# Patient Record
Sex: MALE | Race: WHITE
[De-identification: names, ages, dates, MRNs, and addresses within clinical notes are randomized per-mention and may not be internally consistent; named-entity substitution may affect disease eponyms.]

---

## 2018-01-23 ENCOUNTER — HOSPITAL ENCOUNTER (INPATIENT)
Dept: HOSPITAL 31 - C.ER | Age: 58
LOS: 2 days | Discharge: HOME | DRG: 153 | End: 2018-01-25
Attending: HOSPITALIST | Admitting: HOSPITALIST
Payer: COMMERCIAL

## 2018-01-23 VITALS — RESPIRATION RATE: 20 BRPM

## 2018-01-23 DIAGNOSIS — Z95.5: ICD-10-CM

## 2018-01-23 DIAGNOSIS — F17.210: ICD-10-CM

## 2018-01-23 DIAGNOSIS — Z79.899: ICD-10-CM

## 2018-01-23 DIAGNOSIS — I50.9: ICD-10-CM

## 2018-01-23 DIAGNOSIS — I11.0: ICD-10-CM

## 2018-01-23 DIAGNOSIS — Z79.82: ICD-10-CM

## 2018-01-23 DIAGNOSIS — I25.5: ICD-10-CM

## 2018-01-23 DIAGNOSIS — I25.10: ICD-10-CM

## 2018-01-23 DIAGNOSIS — Z95.810: ICD-10-CM

## 2018-01-23 DIAGNOSIS — I25.2: ICD-10-CM

## 2018-01-23 DIAGNOSIS — J11.1: Primary | ICD-10-CM

## 2018-01-23 DIAGNOSIS — E11.9: ICD-10-CM

## 2018-01-23 LAB
ALBUMIN SERPL-MCNC: 3.7 G/DL (ref 3.5–5)
ALBUMIN/GLOB SERPL: 1.2 {RATIO} (ref 1–2.1)
ALT SERPL-CCNC: 29 U/L (ref 21–72)
APTT BLD: 27 SECONDS (ref 21–34)
AST SERPL-CCNC: 26 U/L (ref 17–59)
BACTERIA #/AREA URNS HPF: (no result) /[HPF]
BASOPHILS # BLD AUTO: 0.1 K/UL (ref 0–0.2)
BASOPHILS NFR BLD: 1 % (ref 0–2)
BILIRUB UR-MCNC: NEGATIVE MG/DL
BNP SERPL-MCNC: 4910 PG/ML (ref 0–900)
BUN SERPL-MCNC: 24 MG/DL (ref 9–20)
CALCIUM SERPL-MCNC: 8.3 MG/DL (ref 8.6–10.4)
EOSINOPHIL # BLD AUTO: 0 K/UL (ref 0–0.7)
EOSINOPHIL NFR BLD: 0.3 % (ref 0–4)
ERYTHROCYTE [DISTWIDTH] IN BLOOD BY AUTOMATED COUNT: 14.6 % (ref 11.5–14.5)
GFR NON-AFRICAN AMERICAN: > 60
GLUCOSE UR STRIP-MCNC: NORMAL MG/DL
HGB BLD-MCNC: 12.7 G/DL (ref 12–18)
INR PPP: 1.4
LEUKOCYTE ESTERASE UR-ACNC: (no result) LEU/UL
LYMPHOCYTE: 2 % (ref 20–40)
LYMPHOCYTES # BLD AUTO: 0.4 K/UL (ref 1–4.3)
LYMPHOCYTES NFR BLD AUTO: 2.9 % (ref 20–40)
MCH RBC QN AUTO: 31.1 PG (ref 27–31)
MCHC RBC AUTO-ENTMCNC: 33.3 G/DL (ref 33–37)
MCV RBC AUTO: 93.3 FL (ref 80–94)
MONOCYTE: 9 % (ref 0–10)
MONOCYTES # BLD: 1 K/UL (ref 0–0.8)
MONOCYTES NFR BLD: 7.4 % (ref 0–10)
N MEN SG B+E COLI K1 AG SPEC QL: NEGATIVE
N MEN SG W135 AG SPEC QL: NEGATIVE
NEUTROPHILS # BLD: 12.4 K/UL (ref 1.8–7)
NEUTROPHILS NFR BLD AUTO: 88.4 % (ref 50–75)
NEUTROPHILS NFR BLD AUTO: 89 % (ref 50–75)
NRBC BLD AUTO-RTO: 0.1 % (ref 0–2)
PH UR STRIP: 5 [PH] (ref 5–8)
PLATELET # BLD EST: NORMAL 10*3/UL
PLATELET # BLD: 159 K/UL (ref 130–400)
PMV BLD AUTO: 9 FL (ref 7.2–11.7)
PROT UR STRIP-MCNC: (no result) MG/DL
PROTHROMBIN TIME: 15.8 SECONDS (ref 9.7–12.2)
RBC # BLD AUTO: 4.09 MIL/UL (ref 4.4–5.9)
RBC # UR STRIP: NEGATIVE /UL
RBC MORPH BLD: NORMAL
SP GR UR STRIP: 1.02 (ref 1–1.03)
STREP PNEUMONIAE: NEGATIVE
STREPTOCOCCUS B: NEGATIVE
TOTAL CELLS COUNTED BLD: 100
URINE NITRATE: NEGATIVE
UROBILINOGEN UR-MCNC: 4 MG/DL (ref 0.2–1)
WBC # BLD AUTO: 14 K/UL (ref 4.8–10.8)

## 2018-01-23 NOTE — CP.PCM.HP
<Thao Lou - Last Filed: 18 23:34>





History of Present Illness





- History of Present Illness


History of Present Illness: 





Medicine Note for Hospitalist Service





CC: fever, SOB, body ache





HPI: 57M with PMHx HTN, CAD with stents, CHF with AICD/ Pacemaker presents to 

the ED with fever, chills, headache, SOB, body ache and fatigue x1 day. Patient 

reports having the flu in 2017, no flu shot, did receive pneumonia 

shot in . Patient reports he uses public transportation (subway and bus) so 

he does have sick contacts there and also at work. Denied recent travel. He 

started to feel his symptoms earlier today, he took Motrin and mucinex and felt 

some relief. After leaving work around 4pm he had chills, weakness, his legs 

were trembling. Patient reports he has been having these symptoms 

intermittently since he was diagnosed with the flu in November, never fully 

recuperating. Denied chest pain, abdominal pain, n/v/d/c, or urinary symptoms. 





PMHx: HTN, CAD with stents, CHF with AICD/ Pacemaker 


PSHx: AICD/ Pacemaker (last interrogated 10/2017), coronary stenting and ankle 

surgery


Meds: As per MAR, reviewed and confirmed 


SHx: smoked 1.5 ppd for 19 yrs. Cut down to 1 pack per month. EtOH socially. 

Denies drug use. Works as an  in the city. 


FHx: Mother - heart dz, carotid dz. Father -  of MI. 2 brothers and 2 

sisters all have diabetes. 





PMD: No PMD at this time 


Cardio: Dr. Suresh 





Cardiac Hx: s/p RCA stent , STEMI in  Totally occluded large LAD, and 

significant disease in RCA and L Cx. At the time patient was approved for 

[facility]. 





Present on Admission





- Present on Admission


Any Indicators Present on Admission: No





Past Patient History





- Past Medical History & Family History


Past Medical History?: Yes





- Past Social History


Smoking Status: Heavy Smoker > 10 Cigarettes Daily





- CARDIAC


Hx Heart Attack: Yes





- PULMONARY


Hx Respiratory Disorders: No





- NEUROLOGICAL


Hx Neurological Disorder: No





- HEENT


Hx HEENT Problems: No





- RENAL


Hx Chronic Kidney Disease: No





- ENDOCRINE/METABOLIC


Hx Endocrine Disorders: No





- HEMATOLOGICAL/ONCOLOGICAL


Hx Blood Disorders: No





- INTEGUMENTARY


Hx Dermatological Problems: No





- MUSCULOSKELETAL/RHEUMATOLOGICAL


Hx Falls: Yes (broken ankle )





- GASTROINTESTINAL


Hx Gastrointestinal Disorders: No





- GENITOURINARY/GYNECOLOGICAL


Hx Genitourinary Disorders: No





- PSYCHIATRIC


Hx Substance Use: No





- SURGICAL HISTORY


Hx Cardiac Catheterization: Yes


Hx Orthopedic Surgery: Yes (Broken ankle, screws in place )





- ANESTHESIA


Hx Anesthesia Reactions: No


Hx Malignant Hyperthermia: No





Meds


Allergies/Adverse Reactions: 


 Allergies











Allergy/AdvReac Type Severity Reaction Status Date / Time


 


No Known Allergies Allergy   Verified 18 17:19














Physical Exam





- Constitutional


Appears: No Acute Distress





- Head Exam


Head Exam: NORMAL INSPECTION, NORMOCEPHALIC





- Eye Exam


Eye Exam: EOMI, Normal appearance, PERRL


Pupil Exam: NORMAL ACCOMODATION





- ENT Exam


ENT Exam: Mucous Membranes Moist, Normal Oropharynx (erythematous)





- Respiratory Exam


Respiratory Exam: Wheezes, NORMAL BREATHING PATTERN.  absent: Decreased Breath 

Sounds (BL LLB)





- Cardiovascular Exam


Cardiovascular Exam: REGULAR RHYTHM, RRR





- GI/Abdominal Exam


GI & Abdominal Exam: Normal Bowel Sounds, Soft.  absent: Distended, Tenderness





- Extremities Exam


Extremities exam: Positive for: normal inspection, pedal pulses present.  

Negative for: pedal edema, tenderness





- Back Exam


Back exam: NORMAL INSPECTION





- Neurological Exam


Neurological exam: Alert, CN II-XII Intact, Oriented x3





- Psychiatric Exam


Psychiatric exam: Normal Affect, Normal Mood





- Skin


Skin Exam: Dry, Intact, Normal Color, Warm





Results





- Vital Signs


Recent Vital Signs: 





 Last Vital Signs











Temp  100.7 F H  18 17:17


 


Pulse  101 H  18 17:17


 


Resp  20   18 17:17


 


BP  152/95 H  18 17:17


 


Pulse Ox  95   18 20:58














- Labs


Result Diagrams: 


 18 18:46





 18 18:46


Labs: 





 Laboratory Results - last 24 hr











  18





  18:46 18:46 18:46


 


WBC  14.0 H  


 


RBC  4.09 L  


 


Hgb  12.7  


 


Hct  38.1  


 


MCV  93.3  D  


 


MCH  31.1 H  


 


MCHC  33.3  


 


RDW  14.6 H  


 


Plt Count  159  


 


MPV  9.0  


 


Neut % (Auto)  88.4 H  


 


Lymph % (Auto)  2.9 L  


 


Mono % (Auto)  7.4  


 


Eos % (Auto)  0.3  


 


Baso % (Auto)  1.0  


 


Neut #  12.4 H  


 


Lymph #  0.4 L  


 


Mono #  1.0 H  


 


Eos #  0.0  


 


Baso #  0.1  


 


Neutrophils % (Manual)  89 H  


 


Lymphocytes % (Manual)  2 L  


 


Monocytes % (Manual)  9  


 


Platelet Estimate  Normal  


 


RBC Morphology  Normal  


 


PT   15.8 H 


 


INR   1.4 


 


APTT   27 


 


Sodium    133


 


Potassium    4.0


 


Chloride    101


 


Carbon Dioxide    25


 


Anion Gap    11


 


BUN    24 H


 


Creatinine    1.1


 


Est GFR ( Amer)    > 60


 


Est GFR (Non-Af Amer)    > 60


 


Random Glucose    126 H


 


Calcium    8.3 L


 


Total Bilirubin    1.4 H


 


AST    26


 


ALT    29


 


Alkaline Phosphatase    111


 


Troponin I    0.0840


 


NT-Pro-B Natriuret Pep    4910 H


 


Total Protein    6.7


 


Albumin    3.7


 


Globulin    3.0


 


Albumin/Globulin Ratio    1.2


 


Urine Color   


 


Urine Clarity   


 


Urine pH   


 


Ur Specific Gravity   


 


Urine Protein   


 


Urine Glucose (UA)   


 


Urine Ketones   


 


Urine Blood   


 


Urine Nitrate   


 


Urine Bilirubin   


 


Urine Urobilinogen   


 


Ur Leukocyte Esterase   


 


Urine WBC (Auto)   


 


Urine RBC (Auto)   


 


Urine Bacteria   


 


Influenza Typ A,B (EIA)   














  18





  18:53 20:15


 


WBC  


 


RBC  


 


Hgb  


 


Hct  


 


MCV  


 


MCH  


 


MCHC  


 


RDW  


 


Plt Count  


 


MPV  


 


Neut % (Auto)  


 


Lymph % (Auto)  


 


Mono % (Auto)  


 


Eos % (Auto)  


 


Baso % (Auto)  


 


Neut #  


 


Lymph #  


 


Mono #  


 


Eos #  


 


Baso #  


 


Neutrophils % (Manual)  


 


Lymphocytes % (Manual)  


 


Monocytes % (Manual)  


 


Platelet Estimate  


 


RBC Morphology  


 


PT  


 


INR  


 


APTT  


 


Sodium  


 


Potassium  


 


Chloride  


 


Carbon Dioxide  


 


Anion Gap  


 


BUN  


 


Creatinine  


 


Est GFR ( Amer)  


 


Est GFR (Non-Af Amer)  


 


Random Glucose  


 


Calcium  


 


Total Bilirubin  


 


AST  


 


ALT  


 


Alkaline Phosphatase  


 


Troponin I  


 


NT-Pro-B Natriuret Pep  


 


Total Protein  


 


Albumin  


 


Globulin  


 


Albumin/Globulin Ratio  


 


Urine Color   Yellow


 


Urine Clarity   Clear


 


Urine pH   5.0


 


Ur Specific Gravity   1.020


 


Urine Protein   1+ H


 


Urine Glucose (UA)   Normal


 


Urine Ketones   Negative


 


Urine Blood   Negative


 


Urine Nitrate   Negative


 


Urine Bilirubin   Negative


 


Urine Urobilinogen   4.0


 


Ur Leukocyte Esterase   Neg


 


Urine WBC (Auto)   1


 


Urine RBC (Auto)   1


 


Urine Bacteria   Rare


 


Influenza Typ A,B (EIA)  Negative for flu a/b 














Assessment & Plan





- Assessment and Plan (Free Text)


Assessment: 





57M with PMHx HTN, CAD with stents, CHF with AICD/ Pacemaker presents to the ED 

with fever, chills, headache, SOB, body ache and fatigue x1 day.


Plan: 





Flu


Questionable Pneumonia 


   Clinically appears like Flu, despite Negative Flu


   F/U PNA labs, BC, sputum culture, procal, mycoplasma, legionella, strep 





Imaging:


   CXR: No focal consolidation noted. 





Meds:


   NS @ 100cc/hr 


   Doxy 100 Q12


   Tamiflu 75mg PO BID 


   Duoneb Q6H PRN, Phenergran PRN 





Diabetes


   HA1C: 6.7


   Patient has lost 50lbs over the past 1-2 years, actively dieting and 

exercising





Hx HTN


   Resumed home medications: Coreg 3.125 BID, Cozaar 50mg 





Hx CAD with stents


   Resumed home medications: ASA 81, Crestor 10mg PO QHS 





Hx CHF with AICD/ Pacemaker


   Cardiologist - Dr. Suresh- BRYCE last interrogated 10/2017 - last office visit 

was 2017- next visit is 2018


   Last recorded LVEF was 30% s/p cath after STEMI in . 


   F/U ECHO 





Prophylactic Measures 


   GI PPX: Pepcid 20mg BID


   DVT PPX: SCDs, Lovenox 40 SC 





DW Dr. Warner,


Thao Lou DO, PGY-1





<Douglas Warner P - Last Filed: 18 06:38>





Results





- Vital Signs


Recent Vital Signs: 





 Last Vital Signs











Temp  102.9 F H  18 04:49


 


Pulse  92 H  18 04:57


 


Resp  20   18 04:57


 


BP  131/79   18 04:57


 


Pulse Ox  100   18 04:57














- Labs


Result Diagrams: 


 18 05:00





 18 05:00


Labs: 





 Laboratory Results - last 24 hr











  18





  18:46 18:46 18:46


 


WBC  14.0 H  


 


RBC  4.09 L  


 


Hgb  12.7  


 


Hct  38.1  


 


MCV  93.3  D  


 


MCH  31.1 H  


 


MCHC  33.3  


 


RDW  14.6 H  


 


Plt Count  159  


 


MPV  9.0  


 


Neut % (Auto)  88.4 H  


 


Lymph % (Auto)  2.9 L  


 


Mono % (Auto)  7.4  


 


Eos % (Auto)  0.3  


 


Baso % (Auto)  1.0  


 


Neut #  12.4 H  


 


Lymph #  0.4 L  


 


Mono #  1.0 H  


 


Eos #  0.0  


 


Baso #  0.1  


 


Neutrophils % (Manual)  89 H  


 


Band Neutrophils %   


 


Lymphocytes % (Manual)  2 L  


 


Monocytes % (Manual)  9  


 


Platelet Estimate  Normal  


 


RBC Morphology  Normal  


 


PT   15.8 H 


 


INR   1.4 


 


APTT   27 


 


Sodium    133


 


Potassium    4.0


 


Chloride    101


 


Carbon Dioxide    25


 


Anion Gap    11


 


BUN    24 H


 


Creatinine    1.1


 


Est GFR ( Amer)    > 60


 


Est GFR (Non-Af Amer)    > 60


 


Random Glucose    126 H


 


Hemoglobin A1c   


 


Calcium    8.3 L


 


Phosphorus   


 


Magnesium   


 


Total Bilirubin    1.4 H


 


AST    26


 


ALT    29


 


Alkaline Phosphatase    111


 


Troponin I    0.0840


 


NT-Pro-B Natriuret Pep    4910 H


 


Total Protein    6.7


 


Albumin    3.7


 


Globulin    3.0


 


Albumin/Globulin Ratio    1.2


 


Triglycerides   


 


Cholesterol   


 


LDL Cholesterol Direct   


 


HDL Cholesterol   


 


Free T4   


 


TSH 3rd Generation   


 


Urine Color   


 


Urine Clarity   


 


Urine pH   


 


Ur Specific Gravity   


 


Urine Protein   


 


Urine Glucose (UA)   


 


Urine Ketones   


 


Urine Blood   


 


Urine Nitrate   


 


Urine Bilirubin   


 


Urine Urobilinogen   


 


Ur Leukocyte Esterase   


 


Urine WBC (Auto)   


 


Urine RBC (Auto)   


 


Urine Bacteria   


 


Influenza Typ A,B (EIA)   


 


H.influenzae Type B Ag   


 


N.meningitidis ACY/W135   


 


N.meningi B/E.coli K1 Ag   


 


Group B Strep Antigen   


 


S. pneumoniae Antigen   














  18





  18:53 20:15 21:52


 


WBC   


 


RBC   


 


Hgb   


 


Hct   


 


MCV   


 


MCH   


 


MCHC   


 


RDW   


 


Plt Count   


 


MPV   


 


Neut % (Auto)   


 


Lymph % (Auto)   


 


Mono % (Auto)   


 


Eos % (Auto)   


 


Baso % (Auto)   


 


Neut #   


 


Lymph #   


 


Mono #   


 


Eos #   


 


Baso #   


 


Neutrophils % (Manual)   


 


Band Neutrophils %   


 


Lymphocytes % (Manual)   


 


Monocytes % (Manual)   


 


Platelet Estimate   


 


RBC Morphology   


 


PT   


 


INR   


 


APTT   


 


Sodium   


 


Potassium   


 


Chloride   


 


Carbon Dioxide   


 


Anion Gap   


 


BUN   


 


Creatinine   


 


Est GFR ( Amer)   


 


Est GFR (Non-Af Amer)   


 


Random Glucose   


 


Hemoglobin A1c   


 


Calcium   


 


Phosphorus   


 


Magnesium   


 


Total Bilirubin   


 


AST   


 


ALT   


 


Alkaline Phosphatase   


 


Troponin I   


 


NT-Pro-B Natriuret Pep   


 


Total Protein   


 


Albumin   


 


Globulin   


 


Albumin/Globulin Ratio   


 


Triglycerides   


 


Cholesterol   


 


LDL Cholesterol Direct   


 


HDL Cholesterol   


 


Free T4   


 


TSH 3rd Generation   


 


Urine Color   Yellow 


 


Urine Clarity   Clear 


 


Urine pH   5.0 


 


Ur Specific Gravity   1.020 


 


Urine Protein   1+ H 


 


Urine Glucose (UA)   Normal 


 


Urine Ketones   Negative 


 


Urine Blood   Negative 


 


Urine Nitrate   Negative 


 


Urine Bilirubin   Negative 


 


Urine Urobilinogen   4.0 


 


Ur Leukocyte Esterase   Neg 


 


Urine WBC (Auto)   1 


 


Urine RBC (Auto)   1 


 


Urine Bacteria   Rare 


 


Influenza Typ A,B (EIA)  Negative for flu a/b  


 


H.influenzae Type B Ag    Negative


 


N.meningitidis ACY/W135    Negative


 


N.meningi B/E.coli K1 Ag    Negative


 


Group B Strep Antigen    Negative


 


S. pneumoniae Antigen    Negative














  18





  21:59 21:59 21:59


 


WBC   


 


RBC   


 


Hgb   


 


Hct   


 


MCV   


 


MCH   


 


MCHC   


 


RDW   


 


Plt Count   


 


MPV   


 


Neut % (Auto)   


 


Lymph % (Auto)   


 


Mono % (Auto)   


 


Eos % (Auto)   


 


Baso % (Auto)   


 


Neut #   


 


Lymph #   


 


Mono #   


 


Eos #   


 


Baso #   


 


Neutrophils % (Manual)   


 


Band Neutrophils %   


 


Lymphocytes % (Manual)   


 


Monocytes % (Manual)   


 


Platelet Estimate   


 


RBC Morphology   


 


PT   


 


INR   


 


APTT   


 


Sodium   


 


Potassium   


 


Chloride   


 


Carbon Dioxide   


 


Anion Gap   


 


BUN   


 


Creatinine   


 


Est GFR ( Amer)   


 


Est GFR (Non-Af Amer)   


 


Random Glucose   


 


Hemoglobin A1c  6.7 H  


 


Calcium   


 


Phosphorus   


 


Magnesium   


 


Total Bilirubin   


 


AST   


 


ALT   


 


Alkaline Phosphatase   


 


Troponin I   


 


NT-Pro-B Natriuret Pep   


 


Total Protein   


 


Albumin   


 


Globulin   


 


Albumin/Globulin Ratio   


 


Triglycerides   


 


Cholesterol   


 


LDL Cholesterol Direct   


 


HDL Cholesterol   


 


Free T4    1.46


 


TSH 3rd Generation   0.23 L 


 


Urine Color   


 


Urine Clarity   


 


Urine pH   


 


Ur Specific Gravity   


 


Urine Protein   


 


Urine Glucose (UA)   


 


Urine Ketones   


 


Urine Blood   


 


Urine Nitrate   


 


Urine Bilirubin   


 


Urine Urobilinogen   


 


Ur Leukocyte Esterase   


 


Urine WBC (Auto)   


 


Urine RBC (Auto)   


 


Urine Bacteria   


 


Influenza Typ A,B (EIA)   


 


H.influenzae Type B Ag   


 


N.meningitidis ACY/W135   


 


N.meningi B/E.coli K1 Ag   


 


Group B Strep Antigen   


 


S. pneumoniae Antigen   














  18





  05:00 05:00


 


WBC  9.8 


 


RBC  3.91 L 


 


Hgb  12.2 


 


Hct  36.7 


 


MCV  93.9 


 


MCH  31.1 H 


 


MCHC  33.1 


 


RDW  14.7 H 


 


Plt Count  125 L D 


 


MPV  9.2 


 


Neut % (Auto)  82.0 H 


 


Lymph % (Auto)  9.4 L 


 


Mono % (Auto)  7.5 


 


Eos % (Auto)  0.4 


 


Baso % (Auto)  0.7 


 


Neut #  8.0 H 


 


Lymph #  0.9 L 


 


Mono #  0.7 


 


Eos #  0.0 


 


Baso #  0.1 


 


Neutrophils % (Manual)  83 H 


 


Band Neutrophils %  1 


 


Lymphocytes % (Manual)  8 L 


 


Monocytes % (Manual)  8 


 


Platelet Estimate  Slightly decreased L 


 


RBC Morphology  


 


PT  


 


INR  


 


APTT  


 


Sodium   132


 


Potassium   3.9


 


Chloride   103


 


Carbon Dioxide   25


 


Anion Gap   8 L


 


BUN   21 H


 


Creatinine   1.2


 


Est GFR ( Amer)   > 60


 


Est GFR (Non-Af Amer)   > 60


 


Random Glucose   101


 


Hemoglobin A1c  


 


Calcium   7.9 L


 


Phosphorus   2.4 L


 


Magnesium   1.7


 


Total Bilirubin   0.9


 


AST   26


 


ALT   31


 


Alkaline Phosphatase   91


 


Troponin I  


 


NT-Pro-B Natriuret Pep  


 


Total Protein   6.0 L


 


Albumin   3.2 L


 


Globulin   2.8


 


Albumin/Globulin Ratio   1.1


 


Triglycerides   67


 


Cholesterol   124


 


LDL Cholesterol Direct   81


 


HDL Cholesterol   26 L


 


Free T4  


 


TSH 3rd Generation  


 


Urine Color  


 


Urine Clarity  


 


Urine pH  


 


Ur Specific Gravity  


 


Urine Protein  


 


Urine Glucose (UA)  


 


Urine Ketones  


 


Urine Blood  


 


Urine Nitrate  


 


Urine Bilirubin  


 


Urine Urobilinogen  


 


Ur Leukocyte Esterase  


 


Urine WBC (Auto)  


 


Urine RBC (Auto)  


 


Urine Bacteria  


 


Influenza Typ A,B (EIA)  


 


H.influenzae Type B Ag  


 


N.meningitidis ACY/W135  


 


N.meningi B/E.coli K1 Ag  


 


Group B Strep Antigen  


 


S. pneumoniae Antigen  














Attending/Attestation





- Attestation


I have personally seen and examined this patient.: Yes


I have fully participated in the care of the patient.: Yes


I have reviewed all pertinent clinical information: Yes


Notes (Text): 





Assessment


* Febrile illness with severe shakes, cough, bodyaches, exposure to sick people

, suspicious for influenza although negative, dd of bacterimia, legionella, 

mycoplasma, empirically started on doxycycline


* H/o CAD ischemic cardiomyopathy s/p AICD, L main, lad, rca stent, compliant 

with meds


Plan


* BC, urine legionella, mycoplasma acute phase, procalcitonin


* Empiric doxy


* Echo


* GI/DVT prophylaxis


* See orders for detail.

## 2018-01-23 NOTE — C.PDOC
History Of Present Illness


57 year old male presents to the ER with a complaint of fever, chills, and 

cough. Patient has a Hx of diabetes which is poorly controlled, has had a 

cardiac cath with multiple stent placements, has poor ejection, and 

defibrillator in place. Denies SOB, nausea, or vomiting.


Time Seen by Provider: 01/23/18 18:25


Chief Complaint (Nursing): Fever


History Per: Patient


History/Exam Limitations: no limitations


Onset/Duration Of Symptoms: Hrs


Current Symptoms Are (Timing): Still Present


Sick Contacts (Context): None


Associated Symptoms: Fever, Chills, Cough


Ear Symptoms: Bilateral: None


Recent travel outside of the [place]: No





Past Medical History


Reviewed: Historical Data, Nursing Documentation, Vital Signs


Vital Signs: 


 Last Vital Signs











Temp  99.9 F H  01/23/18 22:09


 


Pulse  87   01/23/18 22:09


 


Resp  20   01/23/18 22:09


 


BP  124/67   01/23/18 22:09


 


Pulse Ox  95   01/23/18 22:09














- Medical History


PMH: CAD





- [facility] Procedures








CORONAR ARTERIOGR-2 CATH (07/27/15)


INSERTION OF ONE VASCULAR STENT (07/27/15)


INSRT OF DRUG-ELUTING CORON ARTERY STENTS(S) (07/27/15)


LEFT HEART CARDIAC CATH (07/27/15)


LT HEART ANGIOCARDIOGRAM (07/27/15)


PERCUTANEOUS TRANSLUMINAL CORONARY ANGIOPLASTY [PTCA] (07/27/15)


PROCEDURE ON SINGLE VESSEL (07/27/15)








Family History: States: Unknown Family Hx





- Social History


Hx Tobacco Use: Yes


Hx Alcohol Use: No


Hx Substance Use: No





- Immunization History


Hx Tetanus Toxoid Vaccination: No


Hx Influenza Vaccination: No


Hx Pneumococcal Vaccination: No





Review Of Systems


Constitutional: Positive for: Fever, Chills


Cardiovascular: Negative for: Chest Pain, Palpitations


Respiratory: Positive for: Cough.  Negative for: Shortness of Breath


Gastrointestinal: Negative for: Nausea, Vomiting, Abdominal Pain





Physical Exam





- Physical Exam


Appears: Non-toxic, No Acute Distress


Skin: Normal Color, Warm, Dry


Head: Atraumatic, Normacephalic


Eye(s): bilateral: Normal Inspection


Oral Mucosa: Moist


Chest: Symmetrical, No Tenderness


Cardiovascular: Rhythm Regular


Respiratory: Normal Breath Sounds, No Rales, No Rhonchi, No Wheezing


Gastrointestinal/Abdominal: Soft, No Tenderness


Neurological/Psych: Oriented x3, Normal Speech





ED Course And Treatment





- Laboratory Results


Result Diagrams: 


 01/23/18 18:46





 01/23/18 18:46


ECG: Interpreted By Me


ECG Interpretation: Normal


O2 Sat by Pulse Oximetry: 95


Pulse Ox Interpretation: Abnormal





- Radiology


CXR: Interpreted by Me


CXR Interpretation: Yes: Infiltrates (+RML)


Progress Note: IVF, rocephin, azithromycin IV


Reevaluation Time: 20:56


Reassessment Condition: Improved





- Physician Consult Information


Outcome Of Conversation: 2045: d/w Dr. Butler covering Dr. Warner- ok to admit.





Medical Decision Making


Medical Decision Making: 





prob CAP, admit due to h/o immunocompromised states- prior MI, DM





Disposition


Doctor Will See Patient In The: Hospital


Counseled Patient/Family Regarding: Studies Performed, Diagnosis





- Disposition


Disposition: HOSPITALIZED


Disposition Time: 20:58


Condition: GOOD





- Clinical Impression


Clinical Impression: 


 Pneumonia








- Scribe Statement


The provider has reviewed the documentation as recorded by the Jessicaibdarrin Bennett





All medical record entries made by the Jessicaibdarrin were at my direction and 

personally dictated by me. I have reviewed the chart and agree that the record 

accurately reflects my personal performance of the history, physical exam, 

medical decision making, and the department course for this patient. I have 

also personally directed, reviewed, and agree with the discharge instructions 

and disposition.

## 2018-01-23 NOTE — C.PDOC
Time Seen by Provider: 01/23/18 18:25


Chief Complaint (Nursing): Fever





Past Medical History


Vital Signs: 





 Last Vital Signs











Temp  100.7 F H  01/23/18 17:17


 


Pulse  101 H  01/23/18 17:17


 


Resp  20   01/23/18 17:17


 


BP  152/95 H  01/23/18 17:17


 


Pulse Ox  95   01/23/18 17:17














- Medical History


PMH: CAD


   Denies: Chronic Kidney Disease





- [facility] Procedures











CORONAR ARTERIOGR-2 CATH (07/27/15)


INSERTION OF ONE VASCULAR STENT (07/27/15)


INSRT OF DRUG-ELUTING CORON ARTERY STENTS(S) (07/27/15)


LEFT HEART CARDIAC CATH (07/27/15)


LT HEART ANGIOCARDIOGRAM (07/27/15)


PERCUTANEOUS TRANSLUMINAL CORONARY ANGIOPLASTY [PTCA] (07/27/15)


PROCEDURE ON SINGLE VESSEL (07/27/15)











- Social History


Hx Tobacco Use: Yes


Hx Alcohol Use: No


Hx Substance Use: No





- Immunization History


Hx Tetanus Toxoid Vaccination: No


Hx Influenza Vaccination: No


Hx Pneumococcal Vaccination: No





ED Course And Treatment





- Laboratory Results


Result Diagrams: 


 01/23/18 18:46





 01/23/18 18:46


O2 Sat by Pulse Oximetry: 95





Disposition





- Disposition


Forms:  [facility] (English)

## 2018-01-24 LAB
ALBUMIN SERPL-MCNC: 3.2 G/DL (ref 3.5–5)
ALBUMIN/GLOB SERPL: 1.1 {RATIO} (ref 1–2.1)
ALT SERPL-CCNC: 31 U/L (ref 21–72)
AST SERPL-CCNC: 26 U/L (ref 17–59)
BASOPHILS # BLD AUTO: 0.1 K/UL (ref 0–0.2)
BASOPHILS NFR BLD: 0.7 % (ref 0–2)
BUN SERPL-MCNC: 21 MG/DL (ref 9–20)
CALCIUM SERPL-MCNC: 7.9 MG/DL (ref 8.6–10.4)
EOSINOPHIL # BLD AUTO: 0 K/UL (ref 0–0.7)
EOSINOPHIL NFR BLD: 0.4 % (ref 0–4)
ERYTHROCYTE [DISTWIDTH] IN BLOOD BY AUTOMATED COUNT: 14.7 % (ref 11.5–14.5)
GFR NON-AFRICAN AMERICAN: > 60
HDLC SERPL-MCNC: 26 MG/DL (ref 30–70)
HGB BLD-MCNC: 12.2 G/DL (ref 12–18)
LDLC SERPL-MCNC: 81 MG/DL (ref 0–129)
LYMPHOCYTE: 8 % (ref 20–40)
LYMPHOCYTES # BLD AUTO: 0.9 K/UL (ref 1–4.3)
LYMPHOCYTES NFR BLD AUTO: 9.4 % (ref 20–40)
MAGNESIUM SERPL-MCNC: 1.7 MG/DL (ref 1.6–2.3)
MCH RBC QN AUTO: 31.1 PG (ref 27–31)
MCHC RBC AUTO-ENTMCNC: 33.1 G/DL (ref 33–37)
MCV RBC AUTO: 93.9 FL (ref 80–94)
MONOCYTE: 8 % (ref 0–10)
MONOCYTES # BLD: 0.7 K/UL (ref 0–0.8)
MONOCYTES NFR BLD: 7.5 % (ref 0–10)
NEUTROPHILS # BLD: 8 K/UL (ref 1.8–7)
NEUTROPHILS NFR BLD AUTO: 82 % (ref 50–75)
NEUTROPHILS NFR BLD AUTO: 83 % (ref 50–75)
NEUTS BAND NFR BLD: 1 % (ref 0–2)
NRBC BLD AUTO-RTO: 0 % (ref 0–2)
PLATELET # BLD EST: (no result) 10*3/UL
PLATELET # BLD: 125 K/UL (ref 130–400)
PMV BLD AUTO: 9.2 FL (ref 7.2–11.7)
RBC # BLD AUTO: 3.91 MIL/UL (ref 4.4–5.9)
TOTAL CELLS COUNTED BLD: 100
WBC # BLD AUTO: 9.8 K/UL (ref 4.8–10.8)

## 2018-01-24 RX ADMIN — PROMETHAZINE HYDROCHLORIDE PRN MG: 6.25 SYRUP ORAL at 13:34

## 2018-01-24 NOTE — CARD
--------------- APPROVED REPORT --------------





EXAM: Two-dimensional and M-mode echocardiogram with Doppler and 

color Doppler.



Other Information 

Quality : GoodRhythm : 



INDICATION

LV Function:SystolicDiastolic STEMI



RISK FACTORS

Hypertension 

Diabetes



2D DIMENSIONS 

IVSd0.9   (0.7-1.1cm)LVDd6.0   (3.9-5.9cm)

PWd1.6   (0.7-1.1cm)LVDs5.5   (2.5-4.0cm)

FS (%) 9.1   %LVEF (%)19.6   (>50%)



M-Mode DIMENSIONS 

RVDd2.46   (2.1-3.2cm)Left Atrium (MM)4.75   (2.5-4.0cm)

IVSd0.70   (0.7-1.1cm)Aortic Root3.19   (2.2-3.7cm)

LVDd6.33   (4.0-5.6cm)Aortic Cusp Exc.2.05   (1.5-2.0cm)

PWd1.29   (0.7-1.1cm)FS (%) 17   %

LVDs5.27   (2.0-3.8cm)



Mitral Valve

MV E Vpzytmun038.1cm/sMV A Ljohciug76.8cm/sE/A ratio1.8



TDI

E/Lateral E'0.0E/Medial E'0.0



Tricuspid Valve

TR Peak Oqsemsve280pa/sTR Peak Gr.45goPnCFAS44jpRp



 LEFT VENTRICLE 

The left ventricle is milldly dilated.

There is normal left ventricular wall thickness. 

The left ventricular function is seveely reduced, wth diffuse 

hypokinesis. There is akinesis of the septal, inferior and apical 

walls. 

The left ventricular ejection fraction is about 15%.

Transmitral Doppler flow pattern is Grade IIIV-restrictive diastolic 

dysfunction. tissue Doppler is c/w elevated LA pressure.

No left ventricle thrombus noted on this study.

There is no ventricular septal defect visualized.

There is no mass noted in the left ventricle.



 RIGHT VENTRICLE 

The right ventricle is normal size.

There is normal right ventricular wall thickness.

The right ventricular systolic function is normal.

An ICD wire is noted.



 ATRIA 

The left atrial volume index is mild to moderately dilated.

The right atrium size is mild to moderately dilated.

The interatrial septum is intact with no evidence for an atrial 

septal defect.



 AORTIC VALVE 

The aortic valve is normal in structure and function.

No aortic regurgitation is present. 

There is no aortic valvular stenosis. 

There is no aortic valvular vegetation.



 MITRAL VALVE 

The mitral valve is normal in structure and function.

There is no evidence of mitral valve prolapse.

There is no mitral valve stenosis. 

There is mild mitral valve regurgitation noted.



 TRICUSPID VALVE 

The tricuspid valve is normal in structure and function.

There is mild tricuspid valve regurgitation noted. estimated PA 

systolic pressure is 59 mm Hg.

There is no tricuspid valve prolapse or vegetation.

There is no tricuspid valve stenosis. 



 PULMONIC VALVE 

The pulmonary valve is normal in structure and function.

There is no pulmonic valvular regurgitation. 

There is no pulmonic valvular stenosis.



 GREAT VESSELS 

The aortic root is normal in size.

The ascending aorta is normal in size.

The pulmonary artery is normal.

The IVC is dilated in size and collapses >50% with inspiration.



 PERICARDIAL EFFUSION 

The pericardium appears normal.

There is no pleural effusion.



<Conclusion>

The left ventricular function is severely reduced, wth diffuse 

hypokinesis. There is akinesis of the septal, inferior and apical 

walls. 

Transmitral Doppler flow pattern is Grade IIIV-restrictive diastolic 

dysfunction.

Mild mitral regurgitation.

Elevated left atrial pressure.

Moderate pulmonary HTN

## 2018-01-24 NOTE — RAD
PROCEDURE:  CHEST RADIOGRAPH, 1 VIEW



HISTORY:

Shortness of breath 



COMPARISON:

07/27/2015.



FINDINGS:



LUNGS:

The lungs are well inflated and clear.



PLEURA:

No pneumothorax or pleural fluid seen.



CARDIOVASCULAR:

There is persistent moderate cardiomegaly. There is a left-sided dual 

lead transvenous permanent pacing device.



OSSEOUS STRUCTURES:

No significant abnormalities.



VISUALIZED UPPER ABDOMEN:

Normal.



OTHER FINDINGS:

None. 



IMPRESSION:

No active pulmonary disease.

## 2018-01-24 NOTE — CARD
--------------- APPROVED REPORT --------------





EKG Measurement

Heart [id number]

TX [id number]

[id number]

[id number]

[id number]



<Conclusion>

Normal sinus rhythm

Possible Left atrial enlargement

Left ventricular hypertrophy with repolarization abnormality

Abnormal ECG

## 2018-01-24 NOTE — CP.PCM.PN
<Marttia Mia - Last Filed: 01/24/18 11:41>





Subjective





- Date & Time of Evaluation


Date of Evaluation: 01/24/18


Time of Evaluation: 07:00





- Subjective


Subjective: 





Medicine Progress Note:





Patient was seen and examined at bedside in the AM.  Patient states he had a 

fever overnight and he continues to have a slight cough.  He denies nausea, 

vomiting, diarrhea or constipation.  





Objective





- Vital Signs/Intake and Output


Vital Signs (last 24 hours): 


 











Temp Pulse Resp BP Pulse Ox


 


 99.7 F H  84   20   116/76   96 


 


 01/24/18 06:41  01/24/18 06:41  01/24/18 06:41  01/24/18 06:41  01/24/18 06:41











- Medications


Medications: 


 Current Medications





Acetaminophen (Tylenol 325mg Tab)  650 mg PO Q6 PRN


   PRN Reason: Fever >100.4 F


   Last Admin: 01/24/18 04:49 Dose:  650 mg


Acetaminophen (Tylenol 325mg Tab)  650 mg PO Q6 [facility]


   Stop: 01/25/18 00:01


   Last Admin: 01/24/18 06:16 Dose:  Not Given


Albuterol/Ipratropium (Duoneb 3 Mg/0.5 Mg (3 Ml) Ud)  3 ml INH RQ6 PRN


   PRN Reason: Wheezing


   Last Admin: 01/24/18 01:48 Dose:  3 ml


Aspirin (Aspirin Chewable)  81 mg PO DAILY [facility]


Carvedilol (Coreg)  3.125 mg PO BID [facility]


Doxycycline Hyclate (Doryx)  100 mg PO Q12H [facility]


Enoxaparin Sodium (Lovenox)  40 mg SC DAILY [facility]


Famotidine (Pepcid)  20 mg PO BID [facility]


Sodium Chloride (Sodium Chloride 0.9%)  1,000 mls @ 80 mls/hr IV .[id number] [facility]


   Last Admin: 01/24/18 01:15 Dose:  80 mls/hr


Losartan Potassium (Cozaar)  50 mg PO DAILY [facility]


Ondansetron HCl (Zofran Inj)  4 mg IVP Q6 PRN


   PRN Reason: Nausea/Vomiting


Oseltamivir Phosphate (Tamiflu Cap)  75 mg PO BID [facility]


   Stop: 01/28/18 22:32


Promethazine HCl (Phenergan Syrup)  12.5 mg PO Q6 PRN


   PRN Reason: Cough


Rosuvastatin Calcium (Crestor)  10 mg PO HS [facility]


   Last Admin: 01/23/18 22:09 Dose:  10 mg











- Labs


Labs: 


 





 01/24/18 05:00 





 01/24/18 05:00 





 











PT  15.8 SECONDS (9.7-12.2)  H  01/23/18  18:46    


 


INR  1.4   01/23/18  18:46    


 


APTT  27 SECONDS (21-34)   01/23/18  18:46    














- Constitutional


Appears: No Acute Distress





- Head Exam


Head Exam: ATRAUMATIC, NORMAL INSPECTION





- Eye Exam


Eye Exam: EOMI, Normal appearance





- ENT Exam


ENT Exam: Mucous Membranes Moist





- Respiratory Exam


Respiratory Exam: Wheezes (bilateral lobes ), NORMAL BREATHING PATTERN.  absent

: Respiratory Distress





- Cardiovascular Exam


Cardiovascular Exam: REGULAR RHYTHM, +S1, +S2





- GI/Abdominal Exam


GI & Abdominal Exam: Soft, Normal Bowel Sounds.  absent: Tenderness





- Extremities Exam


Extremities Exam: Normal Inspection





- Neurological Exam


Neurological Exam: Alert, Awake, Oriented x3





- Psychiatric Exam


Psychiatric exam: Normal Affect, Normal Mood





- Skin


Skin Exam: Normal Color, Warm





Assessment and Plan





- Assessment and Plan (Free Text)


Assessment: 





1.) Clinical Influenza 


Clinically appears like Flu, despite Negative Flu


- f/u blood culture and sputum culture


- f/u Procal


- mycoplasma, legionella, strep pneumo, and h. influenza --> All negative


- N. Meningitis negative 


- Imaging:


   - Chest X-ray: No focal consolidation noted


   - f/u repeat Chest xray in the AM 


- Medications:


*  bolus one time 


* Doxy 100 Q12


* Tamiflu 75mg PO BID 


* Duoneb Q6H PRN


* Phenergran PRN 





2.) History of Diabetes


- HA1C: 6.7


- Per previous note patient has lost 50lbs over the past 1-2 years, actively 

dieting and exercising





3.) History of HTN


- Coreg 3.125 BID


- Cozaar 50mg 





4.) History of CAD with stents


- Aspirin 81mg daily


- Crestor 10mg PO QHS 





5.) History of CHF with AICD/ Pacemaker


- Cardiologist - Dr. Suresh- AICD last interrogated 10/2017 - last office visit 

was 11/2017- next visit is 2/2018


- Last recorded LVEF was 30% s/p cath after STEMI in 2015. 


- F/U ECHO 





6.) Prophylactic Measures 


- GI PPX: Pepcid 20mg BID


- DVT PPX: SCDs, Lovenox 40 SC





Case discussed with Dr. Krystyna Mai PGY-1  





<Edwin Greenwood - Last Filed: 01/24/18 14:45>





Objective





- Vital Signs/Intake and Output


Vital Signs (last 24 hours): 


 











Temp Pulse Resp BP Pulse Ox


 


 100.0 F H  71   20   120/72   96 


 


 01/24/18 13:31  01/24/18 08:52  01/24/18 08:52  01/24/18 08:52  01/24/18 08:52











- Medications


Medications: 


 Current Medications





Acetaminophen (Tylenol 325mg Tab)  650 mg PO Q6 PRN


   PRN Reason: Fever >100.4 F


   Last Admin: 01/24/18 04:49 Dose:  650 mg


Acetaminophen (Tylenol 325mg Tab)  650 mg PO Q6 [facility]


   Stop: 01/25/18 00:01


   Last Admin: 01/24/18 13:31 Dose:  650 mg


Albuterol/Ipratropium (Duoneb 3 Mg/0.5 Mg (3 Ml) Ud)  3 ml INH RQ6 PRN


   PRN Reason: Wheezing


   Last Admin: 01/24/18 01:48 Dose:  3 ml


Aspirin (Aspirin Chewable)  81 mg PO DAILY [facility]


   Last Admin: 01/24/18 10:45 Dose:  81 mg


Carvedilol (Coreg)  3.125 mg PO BID [facility]


   Last Admin: 01/24/18 10:45 Dose:  3.125 mg


Doxycycline Hyclate (Doryx)  100 mg PO Q12H [facility]


   Last Admin: 01/24/18 11:23 Dose:  100 mg


Enoxaparin Sodium (Lovenox)  40 mg SC DAILY [facility]


   Last Admin: 01/24/18 10:55 Dose:  40 mg


Famotidine (Pepcid)  20 mg PO BID [facility]


   Last Admin: 01/24/18 10:55 Dose:  20 mg


Sodium Chloride (Sodium Chloride 0.9%)  1,000 mls @ 80 mls/hr IV .[id number] [facility]


   Last Admin: 01/24/18 13:32 Dose:  80 mls/hr


Losartan Potassium (Cozaar)  50 mg PO DAILY [facility]


   Last Admin: 01/24/18 10:55 Dose:  50 mg


Ondansetron HCl (Zofran Inj)  4 mg IVP Q6 PRN


   PRN Reason: Nausea/Vomiting


Oseltamivir Phosphate (Tamiflu Cap)  75 mg PO BID [facility]


   Stop: 01/28/18 22:32


   Last Admin: 01/24/18 10:55 Dose:  75 mg


Promethazine HCl (Phenergan Syrup)  12.5 mg PO Q6 PRN


   PRN Reason: Cough


   Last Admin: 01/24/18 13:34 Dose:  12.5 mg


Rosuvastatin Calcium (Crestor)  10 mg PO HS [facility]


   Last Admin: 01/23/18 22:09 Dose:  10 mg











- Labs


Labs: 


 





 01/24/18 05:00 





 01/24/18 05:00 





 











PT  15.8 SECONDS (9.7-12.2)  H  01/23/18  18:46    


 


INR  1.4   01/23/18  18:46    


 


APTT  27 SECONDS (21-34)   01/23/18  18:46    














Attending/Attestation





- Attestation


I have personally seen and examined this patient.: Yes


I have fully participated in the care of the patient.: Yes


I have reviewed all pertinent clinical information, including history, physical 

exam and plan: Yes


Notes (Text): 





01/24/18 14:45


Medical attending: Patient was seen and examined by me, agrees the above note 

by medical resident.





Patient reported that he was feeling somewhat better than previously. His x-ray 

from this morning appeared to be stable we also reviewed his lab work which was 

fine as well.





He will be given a small bolus of intravenous fluid, additional supportive care

, as well as another repeat chest x-ray tomorrow if this repeat chest x-rays 

okay will consider discharging the patient tomorrow. He's can have to continue 

taking the Tamiflu





Thank you very much,


Edwin Greenwood

## 2018-01-25 VITALS
OXYGEN SATURATION: 97 % | TEMPERATURE: 98.2 F | HEART RATE: 73 BPM | SYSTOLIC BLOOD PRESSURE: 133 MMHG | DIASTOLIC BLOOD PRESSURE: 66 MMHG

## 2018-01-25 LAB
ALBUMIN SERPL-MCNC: 3.1 G/DL (ref 3.5–5)
ALBUMIN/GLOB SERPL: 1.1 {RATIO} (ref 1–2.1)
ALT SERPL-CCNC: 57 U/L (ref 21–72)
AST SERPL-CCNC: 58 U/L (ref 17–59)
BASOPHILS # BLD AUTO: 0 K/UL (ref 0–0.2)
BASOPHILS NFR BLD: 0.7 % (ref 0–2)
BUN SERPL-MCNC: 27 MG/DL (ref 9–20)
CALCIUM SERPL-MCNC: 7.8 MG/DL (ref 8.6–10.4)
EOSINOPHIL # BLD AUTO: 0.1 K/UL (ref 0–0.7)
EOSINOPHIL NFR BLD: 1 % (ref 0–4)
ERYTHROCYTE [DISTWIDTH] IN BLOOD BY AUTOMATED COUNT: 15.2 % (ref 11.5–14.5)
GFR NON-AFRICAN AMERICAN: 45
HGB BLD-MCNC: 12 G/DL (ref 12–18)
LYMPHOCYTES # BLD AUTO: 1.4 K/UL (ref 1–4.3)
LYMPHOCYTES NFR BLD AUTO: 20.5 % (ref 20–40)
MAGNESIUM SERPL-MCNC: 1.9 MG/DL (ref 1.6–2.3)
MCH RBC QN AUTO: 32.1 PG (ref 27–31)
MCHC RBC AUTO-ENTMCNC: 33.6 G/DL (ref 33–37)
MCV RBC AUTO: 95.4 FL (ref 80–94)
MONOCYTES # BLD: 0.8 K/UL (ref 0–0.8)
MONOCYTES NFR BLD: 11.8 % (ref 0–10)
NEUTROPHILS # BLD: 4.6 K/UL (ref 1.8–7)
NEUTROPHILS NFR BLD AUTO: 66 % (ref 50–75)
NRBC BLD AUTO-RTO: 0 % (ref 0–2)
PLATELET # BLD: 126 K/UL (ref 130–400)
PMV BLD AUTO: 9.8 FL (ref 7.2–11.7)
RBC # BLD AUTO: 3.74 MIL/UL (ref 4.4–5.9)
WBC # BLD AUTO: 7 K/UL (ref 4.8–10.8)

## 2018-01-25 RX ADMIN — PROMETHAZINE HYDROCHLORIDE PRN MG: 6.25 SYRUP ORAL at 00:39

## 2018-01-25 NOTE — CP.PCM.DIS
<Martita Mai - Last Filed: 18 14:59>





Provider





- Provider


Date of Admission: 


18 20:54





Attending physician: 


Edwin Greenwood DO





Time Spent in preparation of Discharge (in minutes): 40





Hospital Course





- Lab Results


Lab Results: 


 Micro Results





18 21:30   Blood-Venous   Blood Culture - Preliminary


                            NO GROWTH AFTER 24 HOURS


18 21:00   Blood-Venous   Blood Culture - Preliminary


                            NO GROWTH AFTER 24 HOURS





 Most Recent Lab Values











WBC  7.0 K/uL (4.8-10.8)   18  08:39    


 


RBC  3.74 Mil/uL (4.40-5.90)  L  18  08:39    


 


Hgb  12.0 g/dL (12.0-18.0)   18  08:39    


 


Hct  35.7 % (35.0-51.0)   18  08:39    


 


MCV  95.4 fL (80.0-94.0)  H  18  08:39    


 


MCH  32.1 pg (27.0-31.0)  H  18  08:39    


 


MCHC  33.6 g/dL (33.0-37.0)   18  08:39    


 


RDW  15.2 % (11.5-14.5)  H  18  08:39    


 


Plt Count  126 K/uL (130-400)  L  18  08:39    


 


MPV  9.8 fL (7.2-11.7)   18  08:39    


 


Neut % (Auto)  66.0 % (50.0-75.0)   18  08:39    


 


Lymph % (Auto)  20.5 % (20.0-40.0)   18  08:39    


 


Mono % (Auto)  11.8 % (0.0-10.0)  H  18  08:39    


 


Eos % (Auto)  1.0 % (0.0-4.0)   18  08:39    


 


Baso % (Auto)  0.7 % (0.0-2.0)   18  08:39    


 


Neut #  4.6 K/uL (1.8-7.0)   18  08:39    


 


Lymph #  1.4 K/uL (1.0-4.3)   18  08:39    


 


Mono #  0.8 K/uL (0.0-0.8)   18  08:39    


 


Eos #  0.1 K/uL (0.0-0.7)   18  08:39    


 


Baso #  0.0 K/uL (0.0-0.2)   18  08:39    


 


Neutrophils % (Manual)  83 % (50-75)  H  18  05:00    


 


Band Neutrophils %  1 % (0-2)   18  05:00    


 


Lymphocytes % (Manual)  8 % (20-40)  L  18  05:00    


 


Monocytes % (Manual)  8 % (0-10)   18  05:00    


 


Platelet Estimate  Slightly decreased  (NORMAL)  L  18  05:00    


 


RBC Morphology  Normal   18  18:46    


 


PT  15.8 SECONDS (9.7-12.2)  H  18  18:46    


 


INR  1.4   18  18:46    


 


APTT  27 SECONDS (21-34)   18  18:46    


 


Sodium  133 mmol/L (132-148)   18  08:39    


 


Potassium  5.1 mmol/L (3.6-5.2)   18  08:39    


 


Chloride  104 mmol/L ()   18  08:39    


 


Carbon Dioxide  24 mmol/L (22-30)   18  08:39    


 


Anion Gap  10  (10-20)   18  08:39    


 


BUN  27 mg/dL (9-20)  H  18  08:39    


 


Creatinine  1.6 mg/dL (0.8-1.5)  H  18  08:39    


 


Est GFR ( Amer)  54   18  08:39    


 


Est GFR (Non-Af Amer)  45   18  08:39    


 


Random Glucose  99 mg/dL ()   18  08:39    


 


Hemoglobin A1c  6.7 % (4.2-6.5)  H  18  21:59    


 


Calcium  7.8 mg/dl (8.6-10.4)  L  18  08:39    


 


Phosphorus  3.4 mg/dL (2.5-4.5)   18  08:39    


 


Magnesium  1.9 mg/dL (1.6-2.3)   18  08:39    


 


Total Bilirubin  0.9 mg/dL (0.2-1.3)   18  08:39    


 


AST  58 U/L (17-59)   18  08:39    


 


ALT  57 U/L (21-72)   18  08:39    


 


Alkaline Phosphatase  115 U/L ()   18  08:39    


 


Troponin I  0.0840 ng/mL (0.00-0.120)   18  18:46    


 


NT-Pro-B Natriuret Pep  4910 pg/mL (0-900)  H  18  18:46    


 


Total Protein  6.0 g/dL (6.3-8.3)  L  18  08:39    


 


Albumin  3.1 g/dL (3.5-5.0)  L  18  08:39    


 


Globulin  2.9 gm/dL (2.2-3.9)   18  08:39    


 


Albumin/Globulin Ratio  1.1  (1.0-2.1)   18  08:39    


 


Triglycerides  67 mg/dL (0-149)   18  05:00    


 


Cholesterol  124 mg/dL (0-199)   18  05:00    


 


LDL Cholesterol Direct  81 mg/dL (0-129)   18  05:00    


 


HDL Cholesterol  26 mg/dL (30-70)  L  18  05:00    


 


Procalcitonin  1.23 NG/ML (0.19-0.49)  H  18  07:41    


 


Free T4  1.46 ng/dL (0.78-2.19)   18  21:59    


 


TSH 3rd Generation  0.23 mIU/L (0.46-4.68)  L  18  21:59    


 


Urine Color  Yellow  (YELLOW)   18  20:15    


 


Urine Clarity  Clear  (Clear)   18  20:15    


 


Urine pH  5.0  (5.0-8.0)   18  20:15    


 


Ur Specific Gravity  1.020  (1.003-1.030)   18  20:15    


 


Urine Protein  1+ mg/dL (NEGATIVE)  H  18  20:15    


 


Urine Glucose (UA)  Normal mg/dL (Normal)   18  20:15    


 


Urine Ketones  Negative mg/dL (NEGATIVE)   18  20:15    


 


Urine Blood  Negative  (NEGATIVE)   18  20:15    


 


Urine Nitrate  Negative  (NEGATIVE)   18  20:15    


 


Urine Bilirubin  Negative  (NEGATIVE)   18  20:15    


 


Urine Urobilinogen  4.0 mg/dL (0.2-1.0)   18  20:15    


 


Ur Leukocyte Esterase  Neg Irish/uL (Negative)   18  20:15    


 


Urine WBC (Auto)  1 /hpf (0-5)   18  20:15    


 


Urine RBC (Auto)  1 /hpf (0-3)   18  20:15    


 


Urine Bacteria  Rare  (<OCC)   18  20:15    


 


Influenza Typ A,B (EIA)  Negative for flu a/b  (NEGATIVE)   18  18:53    


 


H.influenzae Type B Ag  Negative  (NEGATIVE)   18  21:52    


 


Ur L.pneumophila Ag  Negative  (NEGATIVE)   18  07:48    


 


Mycoplasma pneumon IgM  Negative  (NEGATIVE)   18  07:41    


 


N.meningitidis ACY/W135  Negative  (NEGATIVE)   18  21:52    


 


N.meningi B/E.coli K1 Ag  Negative  (NEGATIVE)   18  21:52    


 


Group B Strep Antigen  Negative  (NEGATIVE)   18  21:52    


 


S. pneumoniae Antigen  Negative  (NEGATIVE)   18  21:52    














- Hospital Course


Hospital Course: 


HPI: 57M with PMHx HTN, CAD with stents, CHF with AICD/ Pacemaker presents to 

the ED with fever, chills, headache, SOB, body ache and fatigue x1 day. Patient 

reports having the flu in 2017, no flu shot, did receive pneumonia 

shot in 2017. Patient reports he uses public transportation (subway and bus) so 

he does have sick contacts there and also at work. Denied recent travel. He 

started to feel his symptoms earlier today, he took Motrin and mucinex and felt 

some relief. After leaving work around 4pm he had chills, weakness, his legs 

were trembling. Patient reports he has been having these symptoms 

intermittently since he was diagnosed with the flu in November, never fully 

recuperating. Denied chest pain, abdominal pain, n/v/d/c, or urinary symptoms. 





PMHx: HTN, CAD with stents, CHF with AICD/ Pacemaker 


PSHx: AICD/ Pacemaker (last interrogated 10/2017), coronary stenting and ankle 

surgery


Meds: As per MAR, reviewed and confirmed 


SHx: smoked 1.5 ppd for 19 yrs. Cut down to 1 pack per month. EtOH socially. 

Denies drug use. [place] as an  [place] the city. 


FHx: Mother - heart dz, carotid dz. Father -  of MI. 2 brothers and 2 

sisters all have diabetes. 





PMD: No PMD at this time 


Cardio: Dr. Suresh 





Cardiac Hx: s/p RCA stent , STEMI in  Totally occluded large LAD, and 

significant disease in RCA and L Cx. At the time patient was approved for 

[facility]. 





Hospital Course:


Patient was admitted for clinical influenza. Patient tested negative for 

influenza types A & B, but had clinical symptoms of influenza. H. influenza 

type B, L. pneumophilia Ag, Mycoplasma pneumonia IgM, N. meningitidis, Group B 

strep, and S. pneumonia Antigen were also negative. Patient's CXR, and repeat 

CXR showed no active pulmonary disease. Patient given  mls bolus once, 

doxycycline 100 mg q12, Tamiflu 75 mg po bid, Duonebs, and Phenergran. Patient'

s fever, chills, body aches, and SOB have resolved. Patient states that his 

cough has improved since admission. Patient to continue Tamiflu and Mucinex 

after discharge. 





Patient has a history of CHF with AICD/pacemaker placed. As per patient, AICD 

last interrogated 10/2017. Patient previously had a last recorded LVEF was 30% s

/p cath after STEMI in . During this hospital course an echo on 18 

showed LVEF 20%, severely reduced left ventricular function with diffuse 

hypokinesis, and akinesis of septal, inferior and apical walls. Transmitral 

doppler flow pattern is Grade IIIV-restrictive diastolic dysfunction. 


Patient has a history of hypertension, and CAD with stents. Patient received 

Coreg 3.125 mg bid, Cozaar 50 mg daily, aspirin 81 mg daily, and Crestor 10 mg 

daily. Discussed with patient to follow up with cardiologist Dr. Suresh.  


Patient has a history of diabetes. Patient's Hb A1C was 6.7%. As per previous 

note, patient has lost 50 lbs over the past 1-2 years actively dieting and 

exercising. 





This is a summary of patient's hospital course, please see chart for full 

details.





Patient stable for discharge home per Dr. Greenwood.


Please continue new medication:


Tamiflu 75mg one tablet this evening followed by 1 tablet twice per day for 

another 3 days.


Mucinex over the counter as needed for cough


Please continue home medications:


Aspirin 81mg one tablet daily


Lipitor 20mg one tablet in the evening


Losartan 50mg one tablet daily


Coreg 3.125mg one tablet twice per day





Please follow up with your primary doctor in 1-2 weeks.


Please follow up with your cardiologist Dr. Suresh.


Please return to the emergency room if symptoms return. 





Discharge Exam





- Head Exam


Head Exam: ATRAUMATIC, NORMAL INSPECTION





- Eye Exam


Eye Exam: EOMI, Normal appearance





- ENT Exam


ENT Exam: Mucous Membranes Moist





- Respiratory Exam


Respiratory Exam: Clear to PA & Lateral, NORMAL BREATHING PATTERN





- Cardiovascular Exam


Cardiovascular Exam: REGULAR RHYTHM, +S1, +S2





- GI/Abdominal Exam


GI & Abdominal Exam: Normal Bowel Sounds, Soft.  absent: Tenderness





- Extremities Exam


Extremities exam: normal inspection





- Neurological Exam


Neurological exam: Alert, Oriented x3





- Psychiatric Exam


Psychiatric exam: Normal Affect, Normal Mood





- Skin


Skin Exam: Dry, Intact, Normal Color, Warm





Discharge Plan





- Discharge Medications


Prescriptions: 


Oseltamivir [Tamiflu Cap] 75 mg PO BID #7 cap





- Follow Up Plan


Condition: GOOD


Disposition: HOME/ ROUTINE


Instructions:  Guaifenesin (By mouth), Oseltamivir (By mouth), Pneumonia (DC)


Additional Instructions: 


Patient stable for discharge home per Dr. Greenwood.


Please continue new medication:


Tamiflu 75mg one tablet this evening followed by 1 tablet twice per day for 

another 3 days.


Mucinex over the counter as needed for cough


Please continue home medications:


Aspirin 81mg one tablet daily


Lipitor 20mg one tablet in the evening


Losartan 50mg one tablet daily


Coreg 3.125mg one tablet twice per day





Please follow up with your primary doctor in 1-2 weeks.


Please follow up with your cardiologist Dr. Suresh.


Please return to the emergency room if symptoms return. 


Referrals: 


Rashaad Suresh MD [Staff Provider] - 





<Edwin Greenwood - Last Filed: 18 15:25>





Provider





- Provider


Date of Admission: 


18 20:54





Attending physician: 


Edwin Greenwood DO








Hospital Course





- Lab Results


Lab Results: 


 Micro Results





18 21:30   Blood-Venous   Blood Culture - Preliminary


                            NO GROWTH AFTER 24 HOURS


18 21:00   Blood-Venous   Blood Culture - Preliminary


                            NO GROWTH AFTER 24 HOURS





 Most Recent Lab Values











WBC  7.0 K/uL (4.8-10.8)   18  08:39    


 


RBC  3.74 Mil/uL (4.40-5.90)  L  18  08:39    


 


Hgb  12.0 g/dL (12.0-18.0)   18  08:39    


 


Hct  35.7 % (35.0-51.0)   18  08:39    


 


MCV  95.4 fL (80.0-94.0)  H  18  08:39    


 


MCH  32.1 pg (27.0-31.0)  H  18  08:39    


 


MCHC  33.6 g/dL (33.0-37.0)   18  08:39    


 


RDW  15.2 % (11.5-14.5)  H  18  08:39    


 


Plt Count  126 K/uL (130-400)  L  18  08:39    


 


MPV  9.8 fL (7.2-11.7)   18  08:39    


 


Neut % (Auto)  66.0 % (50.0-75.0)   18  08:39    


 


Lymph % (Auto)  20.5 % (20.0-40.0)   18  08:39    


 


Mono % (Auto)  11.8 % (0.0-10.0)  H  18  08:39    


 


Eos % (Auto)  1.0 % (0.0-4.0)   18  08:39    


 


Baso % (Auto)  0.7 % (0.0-2.0)   18  08:39    


 


Neut #  4.6 K/uL (1.8-7.0)   18  08:39    


 


Lymph #  1.4 K/uL (1.0-4.3)   18  08:39    


 


Mono #  0.8 K/uL (0.0-0.8)   18  08:39    


 


Eos #  0.1 K/uL (0.0-0.7)   18  08:39    


 


Baso #  0.0 K/uL (0.0-0.2)   18  08:39    


 


Neutrophils % (Manual)  83 % (50-75)  H  18  05:00    


 


Band Neutrophils %  1 % (0-2)   18  05:00    


 


Lymphocytes % (Manual)  8 % (20-40)  L  18  05:00    


 


Monocytes % (Manual)  8 % (0-10)   18  05:00    


 


Platelet Estimate  Slightly decreased  (NORMAL)  L  18  05:00    


 


RBC Morphology  Normal   18  18:46    


 


PT  15.8 SECONDS (9.7-12.2)  H  18  18:46    


 


INR  1.4   18  18:46    


 


APTT  27 SECONDS (21-34)   18  18:46    


 


Sodium  133 mmol/L (132-148)   18  08:39    


 


Potassium  5.1 mmol/L (3.6-5.2)   18  08:39    


 


Chloride  104 mmol/L ()   18  08:39    


 


Carbon Dioxide  24 mmol/L (22-30)   18  08:39    


 


Anion Gap  10  (10-20)   18  08:39    


 


BUN  27 mg/dL (9-20)  H  18  08:39    


 


Creatinine  1.6 mg/dL (0.8-1.5)  H  18  08:39    


 


Est GFR ( Amer)  54   18  08:39    


 


Est GFR (Non-Af Amer)  45   18  08:39    


 


Random Glucose  99 mg/dL ()   18  08:39    


 


Hemoglobin A1c  6.7 % (4.2-6.5)  H  18  21:59    


 


Calcium  7.8 mg/dl (8.6-10.4)  L  18  08:39    


 


Phosphorus  3.4 mg/dL (2.5-4.5)   18  08:39    


 


Magnesium  1.9 mg/dL (1.6-2.3)   18  08:39    


 


Total Bilirubin  0.9 mg/dL (0.2-1.3)   18  08:39    


 


AST  58 U/L (17-59)   18  08:39    


 


ALT  57 U/L (21-72)   18  08:39    


 


Alkaline Phosphatase  115 U/L ()   18  08:39    


 


Troponin I  0.0840 ng/mL (0.00-0.120)   18  18:46    


 


NT-Pro-B Natriuret Pep  4910 pg/mL (0-900)  H  18  18:46    


 


Total Protein  6.0 g/dL (6.3-8.3)  L  18  08:39    


 


Albumin  3.1 g/dL (3.5-5.0)  L  18  08:39    


 


Globulin  2.9 gm/dL (2.2-3.9)   18  08:39    


 


Albumin/Globulin Ratio  1.1  (1.0-2.1)   18  08:39    


 


Triglycerides  67 mg/dL (0-149)   18  05:00    


 


Cholesterol  124 mg/dL (0-199)   18  05:00    


 


LDL Cholesterol Direct  81 mg/dL (0-129)   18  05:00    


 


HDL Cholesterol  26 mg/dL (30-70)  L  18  05:00    


 


Procalcitonin  1.23 NG/ML (0.19-0.49)  H  18  07:41    


 


Free T4  1.46 ng/dL (0.78-2.19)   18  21:59    


 


TSH 3rd Generation  0.23 mIU/L (0.46-4.68)  L  18  21:59    


 


Urine Color  Yellow  (YELLOW)   18  20:15    


 


Urine Clarity  Clear  (Clear)   18  20:15    


 


Urine pH  5.0  (5.0-8.0)   18  20:15    


 


Ur Specific Gravity  1.020  (1.003-1.030)   18  20:15    


 


Urine Protein  1+ mg/dL (NEGATIVE)  H  18  20:15    


 


Urine Glucose (UA)  Normal mg/dL (Normal)   18  20:15    


 


Urine Ketones  Negative mg/dL (NEGATIVE)   18  20:15    


 


Urine Blood  Negative  (NEGATIVE)   18  20:15    


 


Urine Nitrate  Negative  (NEGATIVE)   18  20:15    


 


Urine Bilirubin  Negative  (NEGATIVE)   18  20:15    


 


Urine Urobilinogen  4.0 mg/dL (0.2-1.0)   18  20:15    


 


Ur Leukocyte Esterase  Neg Irish/uL (Negative)   18  20:15    


 


Urine WBC (Auto)  1 /hpf (0-5)   18  20:15    


 


Urine RBC (Auto)  1 /hpf (0-3)   18  20:15    


 


Urine Bacteria  Rare  (<OCC)   18  20:15    


 


Influenza Typ A,B (EIA)  Negative for flu a/b  (NEGATIVE)   18  18:53    


 


H.influenzae Type B Ag  Negative  (NEGATIVE)   18  21:52    


 


Ur L.pneumophila Ag  Negative  (NEGATIVE)   18  07:48    


 


Mycoplasma pneumon IgM  Negative  (NEGATIVE)   18  07:41    


 


N.meningitidis ACY/W135  Negative  (NEGATIVE)   18  21:52    


 


N.meningi B/E.coli K1 Ag  Negative  (NEGATIVE)   18  21:52    


 


Group B Strep Antigen  Negative  (NEGATIVE)   18  21:52    


 


S. pneumoniae Antigen  Negative  (NEGATIVE)   18  21:52    














Attending/Attestation





- Attestation


I have personally seen and examined this patient.: Yes


I have fully participated in the care of the patient.: Yes


I have reviewed all pertinent clinical information, including history, physical 

exam and plan: Yes


Notes (Text): 





18 15:25


Medical attending: Patient was seen and examined by me with the medical 

resident. I reviewed the above note by medical resident and agree.





On physical exam today we had the patient walk around the hallway with us. He 

was able to do so without developing chest pain or shortness of breath. He also 

denied having palpitations.





His blood culture returned it was negative. His white blood cell count stable.


We also repeated a chest x-ray. There is some minimal congestion there however 

it appears to be stable.





The patient is going to have to be discharge with Tamiflu, he should also take 

Mucinex as well.


He should also continue taking his previous home medications he does not have a 

primary care physician he says that he is in the process of finding new one 

will follow-up with his cardiologist





thank you


Edwin Greenwood

## 2018-01-25 NOTE — RAD
Chest x-ray single frontal view 



History: Congestive heart failure. 



Comparison: 01/23/2018 



Findings: 



Left-sided pacemaker. 



Mild venous congestion. 



Mild cardiomegaly. 



Impression: 



Mild venous congestion. 



Mild cardiomegaly.

## 2018-03-26 ENCOUNTER — HOSPITAL ENCOUNTER (OUTPATIENT)
Dept: HOSPITAL 31 - C.ER | Age: 58
Setting detail: OBSERVATION
LOS: 2 days | Discharge: HOME | End: 2018-03-28
Attending: INTERNAL MEDICINE | Admitting: INTERNAL MEDICINE
Payer: COMMERCIAL

## 2018-03-26 DIAGNOSIS — K74.60: ICD-10-CM

## 2018-03-26 DIAGNOSIS — Z23: ICD-10-CM

## 2018-03-26 DIAGNOSIS — I11.0: ICD-10-CM

## 2018-03-26 DIAGNOSIS — N42.89: ICD-10-CM

## 2018-03-26 DIAGNOSIS — I44.7: ICD-10-CM

## 2018-03-26 DIAGNOSIS — N28.1: ICD-10-CM

## 2018-03-26 DIAGNOSIS — Z87.891: ICD-10-CM

## 2018-03-26 DIAGNOSIS — Z95.5: ICD-10-CM

## 2018-03-26 DIAGNOSIS — Z95.810: ICD-10-CM

## 2018-03-26 DIAGNOSIS — K80.20: ICD-10-CM

## 2018-03-26 DIAGNOSIS — I25.10: ICD-10-CM

## 2018-03-26 DIAGNOSIS — J18.9: ICD-10-CM

## 2018-03-26 DIAGNOSIS — I50.30: ICD-10-CM

## 2018-03-26 DIAGNOSIS — R18.8: Primary | ICD-10-CM

## 2018-03-26 LAB
ALBUMIN SERPL-MCNC: 3.6 G/DL (ref 3.5–5)
ALBUMIN/GLOB SERPL: 1.2 {RATIO} (ref 1–2.1)
ALT SERPL-CCNC: 32 U/L (ref 21–72)
APTT BLD: 29 SECONDS (ref 21–34)
AST SERPL-CCNC: 32 U/L (ref 17–59)
BASOPHILS # BLD AUTO: 0.1 K/UL (ref 0–0.2)
BASOPHILS NFR BLD: 0.9 % (ref 0–2)
BILIRUB UR-MCNC: NEGATIVE MG/DL
BUN SERPL-MCNC: 33 MG/DL (ref 9–20)
CALCIUM SERPL-MCNC: 8.5 MG/DL (ref 8.6–10.4)
CK MB SERPL-MCNC: 1.56 NG/ML (ref 0–3.38)
EOSINOPHIL # BLD AUTO: 0.1 K/UL (ref 0–0.7)
EOSINOPHIL NFR BLD: 0.6 % (ref 0–4)
ERYTHROCYTE [DISTWIDTH] IN BLOOD BY AUTOMATED COUNT: 16.1 % (ref 11.5–14.5)
GFR NON-AFRICAN AMERICAN: 48
GLUCOSE UR STRIP-MCNC: NORMAL MG/DL
HGB BLD-MCNC: 12.8 G/DL (ref 12–18)
HYALINE CASTS #/AREA URNS LPF: (no result) /LPF (ref 0–2)
INR PPP: 1.3
LEUKOCYTE ESTERASE UR-ACNC: (no result) LEU/UL
LYMPHOCYTES # BLD AUTO: 2.6 K/UL (ref 1–4.3)
LYMPHOCYTES NFR BLD AUTO: 21.1 % (ref 20–40)
MCH RBC QN AUTO: 29.1 PG (ref 27–31)
MCHC RBC AUTO-ENTMCNC: 32.4 G/DL (ref 33–37)
MCV RBC AUTO: 89.7 FL (ref 80–94)
MONOCYTES # BLD: 1.3 K/UL (ref 0–0.8)
MONOCYTES NFR BLD: 10.7 % (ref 0–10)
NEUTROPHILS # BLD: 8.1 K/UL (ref 1.8–7)
NEUTROPHILS NFR BLD AUTO: 66.7 % (ref 50–75)
NRBC BLD AUTO-RTO: 0.1 % (ref 0–2)
PH UR STRIP: 5 [PH] (ref 5–8)
PLATELET # BLD: 160 K/UL (ref 130–400)
PMV BLD AUTO: 10.2 FL (ref 7.2–11.7)
PROT UR STRIP-MCNC: (no result) MG/DL
PROTHROMBIN TIME: 15.2 SECONDS (ref 9.7–12.2)
RBC # BLD AUTO: 4.4 MIL/UL (ref 4.4–5.9)
RBC # UR STRIP: NEGATIVE /UL
SP GR UR STRIP: 1.02 (ref 1–1.03)
SQUAMOUS EPITHIAL: < 1 /HPF (ref 0–5)
TROPONIN I SERPL-MCNC: 0.02 NG/ML (ref 0–0.12)
UROBILINOGEN UR-MCNC: 4 MG/DL (ref 0.2–1)
WBC # BLD AUTO: 12.2 K/UL (ref 4.8–10.8)

## 2018-03-26 PROCEDURE — 83550 IRON BINDING TEST: CPT

## 2018-03-26 PROCEDURE — 89051 BODY FLUID CELL COUNT: CPT

## 2018-03-26 PROCEDURE — 83880 ASSAY OF NATRIURETIC PEPTIDE: CPT

## 2018-03-26 PROCEDURE — 36415 COLL VENOUS BLD VENIPUNCTURE: CPT

## 2018-03-26 PROCEDURE — 83540 ASSAY OF IRON: CPT

## 2018-03-26 PROCEDURE — 85025 COMPLETE CBC W/AUTO DIFF WBC: CPT

## 2018-03-26 PROCEDURE — 85730 THROMBOPLASTIN TIME PARTIAL: CPT

## 2018-03-26 PROCEDURE — 71045 X-RAY EXAM CHEST 1 VIEW: CPT

## 2018-03-26 PROCEDURE — 93306 TTE W/DOPPLER COMPLETE: CPT

## 2018-03-26 PROCEDURE — 85610 PROTHROMBIN TIME: CPT

## 2018-03-26 PROCEDURE — 81001 URINALYSIS AUTO W/SCOPE: CPT

## 2018-03-26 PROCEDURE — 93005 ELECTROCARDIOGRAM TRACING: CPT

## 2018-03-26 PROCEDURE — 80053 COMPREHEN METABOLIC PANEL: CPT

## 2018-03-26 PROCEDURE — 84484 ASSAY OF TROPONIN QUANT: CPT

## 2018-03-26 PROCEDURE — 93970 EXTREMITY STUDY: CPT

## 2018-03-26 PROCEDURE — 49083 ABD PARACENTESIS W/IMAGING: CPT

## 2018-03-26 PROCEDURE — 84157 ASSAY OF PROTEIN OTHER: CPT

## 2018-03-26 PROCEDURE — 87070 CULTURE OTHR SPECIMN AEROBIC: CPT

## 2018-03-26 PROCEDURE — 82550 ASSAY OF CK (CPK): CPT

## 2018-03-26 PROCEDURE — 82728 ASSAY OF FERRITIN: CPT

## 2018-03-26 PROCEDURE — 99284 EMERGENCY DEPT VISIT MOD MDM: CPT

## 2018-03-26 PROCEDURE — 80074 ACUTE HEPATITIS PANEL: CPT

## 2018-03-26 PROCEDURE — 74176 CT ABD & PELVIS W/O CONTRAST: CPT

## 2018-03-26 PROCEDURE — 83735 ASSAY OF MAGNESIUM: CPT

## 2018-03-26 PROCEDURE — 82042 OTHER SOURCE ALBUMIN QUAN EA: CPT

## 2018-03-26 PROCEDURE — 90674 CCIIV4 VAC NO PRSV 0.5 ML IM: CPT

## 2018-03-26 PROCEDURE — 80048 BASIC METABOLIC PNL TOTAL CA: CPT

## 2018-03-26 PROCEDURE — 82150 ASSAY OF AMYLASE: CPT

## 2018-03-26 PROCEDURE — 86703 HIV-1/HIV-2 1 RESULT ANTBDY: CPT

## 2018-03-26 PROCEDURE — 90471 IMMUNIZATION ADMIN: CPT

## 2018-03-26 PROCEDURE — 82553 CREATINE MB FRACTION: CPT

## 2018-03-26 NOTE — C.PDOC
History Of Present Illness


57 year old male with PMHx of CAD, CHF, defibrillator inserted presents to the 

ED for evaluation of bloating sensation, abdominal distention and discomfort. 

Patient reports his cardiologist is Dr. Suresh. Patient was recently admitted in 

January for pneumonia. Patient followed up with his primary Dr. Alvares. Patient 

reports he is able to walk on a flat surface without feeling SOB but when he is 

going upstairs or uphills he feels SOB. Patient went today to a [facility] MD who 

advised the patient to come to the ED. Patient denies fever, chills, URI 

symptoms, nausea, vomit, diarrhea.  


Chief Complaint (Nursing): Medical Clearance


History Per: Patient


History/Exam Limitations: no limitations


Onset/Duration Of Symptoms: Days


Current Symptoms Are (Timing): Still Present


Reports Recently: Treated By A Physician (), Hospitalized (January )


Recent travel outside of the [place]: No


Additional History Per: Patient





Past Medical History


Reviewed: Historical Data, Nursing Documentation, Vital Signs


Vital Signs: 


 Last Vital Signs











Temp  97.7 F   18 15:57


 


Pulse  63   18 15:57


 


Resp  20   18 15:57


 


BP  102/64   18 15:57


 


Pulse Ox  100   18 22:23














- Medical History


PMH: CAD, CHF


   Denies: Chronic Kidney Disease


Other Surgeries: Defibrillator inserted





- [facility] Procedures








CORONAR ARTERIOGR-2 CATH (07/27/15)


INSERTION OF ONE VASCULAR STENT (07/27/15)


INSRT OF DRUG-ELUTING CORON ARTERY STENTS(S) (07/27/15)


LEFT HEART CARDIAC CATH (07/27/15)


LT HEART ANGIOCARDIOGRAM (07/27/15)


PERCUTANEOUS TRANSLUMINAL CORONARY ANGIOPLASTY [PTCA] (07/27/15)


PROCEDURE ON SINGLE VESSEL (07/27/15)








Family History: States: Unknown Family Hx





- Social History


Hx Tobacco Use: Yes


Hx Alcohol Use: Yes


Hx Substance Use: No





- Immunization History


Hx Tetanus Toxoid Vaccination: No


Hx Influenza Vaccination: No


Hx Pneumococcal Vaccination: No





Review Of Systems


Constitutional: Negative for: Fever, Chills


Cardiovascular: Negative for: Chest Pain


Respiratory: Positive for: SOB with Excertion.  Negative for: Cough, Shortness 

of Breath


Gastrointestinal: Positive for: Abdominal Pain


Genitourinary: Negative for: Dysuria


Musculoskeletal: Negative for: Back Pain


Skin: Negative for: Rash


Neurological: Negative for: Weakness, Numbness





Physical Exam





- Physical Exam


Appears: Non-toxic, No Acute Distress


Skin: Normal Color, Warm, Dry


Head: Atraumatic, Normacephalic


Eye(s): bilateral: Normal Inspection


Nose: No Discharge


Oral Mucosa: Moist


Neck: Normal ROM, Supple


Chest: Symmetrical


Cardiovascular: Rhythm Regular, No Murmur


Respiratory: Normal Breath Sounds, No Rales, No Rhonchi, No Wheezing


Gastrointestinal/Abdominal: Soft, Tenderness (diffuse), Distention, No Guarding

, No Rebound


Extremity: Normal ROM, No Tenderness, Pedal Edema (B/L), Capillary Refill (< 2 

seconds)


Pulses: Left Dorsalis Pedis: Normal, Right Dorsalis Pedis: Normal


Neurological/Psych: Oriented x3


Gait: Steady





ED Course And Treatment





- Laboratory Results


Result Diagrams: 


 18 08:21





 18 07:20


ECG: Interpreted By Me, Viewed By Me


ECG Rhythm: Sinus Rhythm, L BBB (Incomplete)


Interpretation Of ECG: T inversion mostly in inferior and lateral leads


Rate From EC (BPM)


O2 Sat by Pulse Oximetry: 100 (On RA)


Pulse Ox Interpretation: Normal





- Other Rad


  ** CXR


X-Ray: Viewed By Me, Read By Radiologist


Interpretation: Accession No. : [id number].  Patient Name / ID : VIKA ADAMES  / [id number].  Exam Date : 2018 13:34:57 ( Approved ).  Study 

Comment :  Sex / Age : M  / 57Y.  Creator : Ti Poole MD.  Dictator 

: Ti Poole MD.   :  Approver : Ti Poole MD.  

Approver2 :  Report Date : 2018 13:44:59.  My Comment :  *****************

******************************************************************.  PROCEDURE:

  CHEST RADIOGRAPH, 1 VIEW.  HISTORY:  abdominal discomfort.  COMPARISON:  

Chest radiograph dated 2018.  FINDINGS:  LUNGS:  Clear.  PLEURA:  No 

pneumothorax or pleural fluid seen.  CARDIOVASCULAR:  Left subclavian access 

AICD/ pacemaker redemonstrated.  Cardiomediastinal silhouette stably enlarged.  

OSSEOUS STRUCTURES:  Changed.  VISUALIZED UPPER ABDOMEN:  Normal.  OTHER 

FINDINGS:  None.  IMPRESSION:  No active disease.





- CT Scan/US


  ** CT abd/pelvis


Other Rad Studies (CT/US): Read By Radiologist, Radiology Report Reviewed


CT/US Interpretation: Accession No. : [id number].  Patient Name / ID : 

VIKA ADAMES  / [id number].  Exam Date : 2018 14:25:15 ( Approved ).  

Study Comment :  Sex / Age : M  / 57Y.  Creator : Last Carcamo MD.  

Dictator :   :  Approver : Last Carcamo MD.  Approver2 :  

Report Date : 2018 14:51:15.  My Comment :  ******************************

*****************************************************.  PROCEDURE:  CT scan 

abdomen pelvis dated 2018.  HISTORY:  Abdominal discomfort with mild 

distention.  COMPARISON:  No prior study available for comparison.  TECHNIQUE:  

Contiguous axial images of the abdomen pelvis performed all without oral or 

intravenous contrast material.  Additional 2 dimensional sagittal and coronal 

reformats generated.  Radiation dose:  Total exam DLP =.  This CT exam was 

performed using one or more of the following dose reduction techniques: 

Automated exposure control, adjustment of the mA and/or kV according to patient 

size, and/or use of iterative reconstruction technique. .  FINDINGS:  LOWER 

THORAX:  Heart is enlarged.  No significant pericardial effusion. There is a 

small of right-sided pleural effusion and mild associate right basilar 

atelectasis. No evidence of basilar pneumothorax.  LIVER:  The liver exhibits 

normal size measuring approximately 17.2 cm in CC dimension. Questionable 

nodular hepatic surface contour ; rule out cirrhosis. No obvious hepatic mass 

or collection seen on this noncontrast study.  There is a moderate to large 

amount of abdominal and pelvic ascites.  GALLBLADDER AND BILE DUCTS:  

Cholelithiasis. There appears to be a small amount of pericholecystic fluid 

secondary to ascites fluid.  PANCREAS:  Pancreas appears slightly atrophic and 

fatty replaced.  SPLEEN:  Unremarkable. No splenomegaly.  ADRENALS:  Mildly 

enlarged on left adrenal gland.  KIDNEYS AND URETERS:  Kidneys demonstrate 

relatively symmetric size.  No evidence of nephrolithiasis or hydronephrosis. 

There is an approximately 3.3 cm cyst extending from the inferior aspect of the 

lower pole right kidney which appears to extend inferiorly and is partially 

exophytic with extension into the inferior aspect of the left pelvis. Mild 

infiltration changes at present in the perinephric fat bilaterally left more so 

than right nonspecific.  Rule out UTI.  BLADDER:  Urinary bladder incompletely 

distended which in part accounts for thick-walled appearance.  Muscular 

hypertrophy presumably contributes.  Possibility of a cystitis or other 

intrinsic/ invasive wall lesion should be excluded.  REPRODUCTIVE:  Prostate 

gland measures approximately 3.9 cm in transverse dimension.  Prostatic 

calcifications are present.  APPENDIX:  Appendix not seen with however what 

probably represents normal appendix of best seen on coronal image number [id number] 

and axial image number [id number]. Certainty. Ascites fluid also limits evaluation 

for appendicitis.  BOWEL:  Evaluation of the bowel is limited due to the lack 

of oral contrast material as well as abdominal ascites. Stomach is incompletely 

distended which presumably in part accounts for thick-walled appearance.  

Possibility of this gastritis not excluded. .  Stool and air seen throughout 

the large bowel. There does appear to be a few scattered colonic diverticula 

however no definitive radiographic evidence acute diverticulitis ; ascites 

fluid however limits evaluation for S diverticulitis.  PERITONEUM:  No gross 

free intraperitoneal air abdominal and pelvic ascites as above.  LYMPH NODES:  

Unremarkable. No enlarged lymph nodes.  VASCULATURE:  No definitive evidence of 

abdominal aortic or iliac artery aneurysm seen.  BONES:  Minor multilevel 

degenerative spondylosis of the lower thoracic and lumbar spine.  OTHER FINDINGS

:  None.  IMPRESSION:  Small right-sided effusion with suspected minimal right 

basilar atelectasis. Cardiomegaly.  Large amount of abdominal and pelvic 

ascites with what appears represent changes of some hepatic cirrhosis.  

Clinical correlation recommended.  Cholelithiasis.  Left renal cyst.  

Infiltration changes seen in the perinephric fat bilaterally left greater than 

right.  The urinary bladder is also thick-walled in appearance in part due to 

underdistention and muscular hypertrophy however the possibility of a cystitis 

with ascending UTI not excluded.  Clinical correlation recommended





Medical Decision Making


Medical Decision Making: 


Impression: abdominal distention and discomfort


Plan:


* CT abd/pelvis


* EKG


* Labs


* CXR


* UA





Spoke with Dr. Alvares who accepted the patient under her service and will be 

admitted for ascites. 





Disposition





- Disposition


Disposition: HOSPITALIZED


Disposition Time: 15:41


Condition: FAIR





- Clinical Impression


Clinical Impression: 


 Ascites, Abdominal pain








- PA / NP / Resident Statement


MD/DO has reviewed & agrees with the documentation as recorded.





- Scribe Statement


The provider has reviewed the documentation as recorded by the Scribe


Leonides Bautista





All medical record entries made by the Jessicaibdarrin were at my direction and 

personally dictated by me. I have reviewed the chart and agree that the record 

accurately reflects my personal performance of the history, physical exam, 

medical decision making, and the department course for this patient. I have 

also personally directed, reviewed, and agree with the discharge instructions 

and disposition.

## 2018-03-26 NOTE — CP.PCM.HP
History of Present Illness





- History of Present Illness


History of Present Illness: 





57 y.o. male with PMH


CAD s/p stent


CHF


s/p pacemaker


Pneumonia admission jan2018








Patient was doing okay until he noted increasing abdominal pain in the last 4-5 

days , he wnet to clinic and was told to go to ED . patient reports nausae 

butno vomiting no diarrhea no constipation no jaundice, no fever .








PMH as above 


CAD s/p stent


CHF 


sp/pacemaker 





Social


denies smoking


admits occassional ETOH


denies IVDU





Meds


NKDA


Losartan


Carvedilol


statin


aspirin





Surgery 


s/p stent and ankle fracture 

















Present on Admission





- Present on Admission


Any Indicators Present on Admission: No


History of DVT/PE: No


History of Uncontrolled Diabetes: No


Urinary Catheter: No


Decubitus Ulcer Present: No





Review of Systems





- Constitutional


Constitutional: absent: Fever, Frequent Falls, Lethargy, Malaise, Weakness





- EENT


Eyes: absent: Blurred Vision, Other Visual Disturbances


Ears: absent: Disequilibrium


Nose/Mouth/Throat: absent: Epistaxis, Dysphagia





- Cardiovascular


Cardiovascular: absent: Chest Pain, Diaphoresis, Syncope





- Respiratory


Respiratory: absent: Cough, Chest Congestion





- Gastrointestinal


Gastrointestinal: Abdominal Pain, Nausea.  absent: Change in Bowel Habits, 

Diarrhea, Vomiting





- Genitourinary


Genitourinary: absent: Change in Urinary Stream, Difficulty Urinating, Flank 

Pain





- Musculoskeletal


Musculoskeletal: absent: Abnormal Gait, Deformity, Joint Swelling, Limited 

Range of Motion





- Integumentary


Integumentary: absent: Skin Ulcer, Sores, Unusual Bruising, Jaundice





- Neurological


Neurological: absent: Abnormal Gait, Abnormal Hearing, Abnormal Speech, 

Behavioral Changes, Convulsions





- Psychiatric


Psychiatric: absent: Auditory Hallucinations, Behavioral Changes, Confusion, 

Depression, Memory Loss





- Endocrine


Endocrine: Polydipsia.  absent: Flushing, Polyphagia, Polyuria





- Hematologic/Lymphatic


Hematologic: absent: Easy Bleeding, Easy Bruising





Past Patient History





- Infectious Disease


Hx of Infectious Diseases: None





- Past Medical History & Family History


Past Medical History?: Yes





- Past Social History


Smoking Status: Former Smoker





- CARDIAC


Hx Congestive Heart Failure: Yes





- PULMONARY


Hx Respiratory Disorders: No





- NEUROLOGICAL


Hx Neurological Disorder: No





- HEENT


Hx HEENT Problems: No





- RENAL


Hx Chronic Kidney Disease: No





- ENDOCRINE/METABOLIC


Hx Endocrine Disorders: No





- HEMATOLOGICAL/ONCOLOGICAL


Hx Blood Disorders: No





- INTEGUMENTARY


Hx Dermatological Problems: No





- MUSCULOSKELETAL/RHEUMATOLOGICAL


Hx Falls: Yes (broken ankle 1999)





- GASTROINTESTINAL


Hx Gastrointestinal Disorders: No





- GENITOURINARY/GYNECOLOGICAL


Hx Genitourinary Disorders: No





- PSYCHIATRIC


Hx Substance Use: No





- SURGICAL HISTORY


Hx Cardiac Catheterization: Yes


Hx Orthopedic Surgery: Yes (Broken ankle, screws in place 1999)





- ANESTHESIA


Hx Anesthesia: Yes


Hx Anesthesia Reactions: No


Hx Malignant Hyperthermia: No





Meds


Allergies/Adverse Reactions: 


 Allergies











Allergy/AdvReac Type Severity Reaction Status Date / Time


 


No Known Allergies Allergy   Verified 01/23/18 17:19














Physical Exam





- Constitutional


Appears: Non-toxic, No Acute Distress





- Head Exam


Head Exam: ATRAUMATIC, NORMOCEPHALIC





- Eye Exam


Eye Exam: Normal appearance.  absent: Nystagmus





- ENT Exam


ENT Exam: Mucous Membranes Moist





- Neck Exam


Neck exam: Positive for: Full Rom





- Respiratory Exam


Respiratory Exam: Clear to Auscultation Bilateral, NORMAL BREATHING PATTERN





- GI/Abdominal Exam


GI & Abdominal Exam: Distended, Hypoactive Bowel Sounds, Soft, Tenderness (

vague )





- Extremities Exam


Extremities exam: Positive for: full ROM, normal inspection.  Negative for: 

pedal edema, tenderness





- Neurological Exam


Neurological exam: Alert, Normal Gait, Oriented x3





- Psychiatric Exam


Psychiatric exam: Normal Affect, Normal Mood





- Skin


Skin Exam: Intact, Normal Color





Results





- Vital Signs


Recent Vital Signs: 





 Last Vital Signs











Temp  97.8 F   03/26/18 17:41


 


Pulse  65   03/26/18 17:41


 


Resp  20   03/26/18 17:41


 


BP  123/88   03/26/18 17:41


 


Pulse Ox  98   03/26/18 17:41














- Labs


Result Diagrams: 


 03/26/18 13:43





 03/26/18 13:43


Labs: 





 Laboratory Results - last 24 hr











  03/26/18 03/26/18 03/26/18





  13:43 13:43 13:43


 


WBC  12.2 H D  


 


RBC  4.40  


 


Hgb  12.8  


 


Hct  39.4  


 


MCV  89.7  D  


 


MCH  29.1  


 


MCHC  32.4 L  


 


RDW  16.1 H  


 


Plt Count  160  


 


MPV  10.2  


 


Neut % (Auto)  66.7  


 


Lymph % (Auto)  21.1  


 


Mono % (Auto)  10.7 H  


 


Eos % (Auto)  0.6  


 


Baso % (Auto)  0.9  


 


Neut # (Auto)  8.1 H  


 


Lymph # (Auto)  2.6  


 


Mono # (Auto)  1.3 H  


 


Eos # (Auto)  0.1  


 


Baso # (Auto)  0.1  


 


PT   15.2 H 


 


INR   1.3 


 


APTT   29 


 


Sodium    143


 


Potassium    3.9


 


Chloride    108 H


 


Carbon Dioxide    22


 


Anion Gap    17


 


BUN    33 H


 


Creatinine    1.5


 


Est GFR ( Amer)    58


 


Est GFR (Non-Af Amer)    48


 


Random Glucose    94


 


Calcium    8.5 L


 


Magnesium    1.8


 


Total Bilirubin    1.1


 


AST    32


 


ALT    32


 


Alkaline Phosphatase    111


 


Total Creatine Kinase    128


 


CK-MB (Mass)    1.56


 


Troponin I    0.0220


 


NT-Pro-B Natriuret Pep    4460 H


 


Total Protein    6.6


 


Albumin    3.6


 


Globulin    3.1


 


Albumin/Globulin Ratio    1.2


 


Urine Color   


 


Urine Clarity   


 


Urine pH   


 


Ur Specific Gravity   


 


Urine Protein   


 


Urine Glucose (UA)   


 


Urine Ketones   


 


Urine Blood   


 


Urine Nitrate   


 


Urine Bilirubin   


 


Urine Urobilinogen   


 


Ur Leukocyte Esterase   


 


Urine WBC (Auto)   


 


Urine RBC (Auto)   


 


Ur Squamous Epith Cells   


 


Hyaline Casts   














  03/26/18





  15:21


 


WBC 


 


RBC 


 


Hgb 


 


Hct 


 


MCV 


 


MCH 


 


MCHC 


 


RDW 


 


Plt Count 


 


MPV 


 


Neut % (Auto) 


 


Lymph % (Auto) 


 


Mono % (Auto) 


 


Eos % (Auto) 


 


Baso % (Auto) 


 


Neut # (Auto) 


 


Lymph # (Auto) 


 


Mono # (Auto) 


 


Eos # (Auto) 


 


Baso # (Auto) 


 


PT 


 


INR 


 


APTT 


 


Sodium 


 


Potassium 


 


Chloride 


 


Carbon Dioxide 


 


Anion Gap 


 


BUN 


 


Creatinine 


 


Est GFR ( Amer) 


 


Est GFR (Non-Af Amer) 


 


Random Glucose 


 


Calcium 


 


Magnesium 


 


Total Bilirubin 


 


AST 


 


ALT 


 


Alkaline Phosphatase 


 


Total Creatine Kinase 


 


CK-MB (Mass) 


 


Troponin I 


 


NT-Pro-B Natriuret Pep 


 


Total Protein 


 


Albumin 


 


Globulin 


 


Albumin/Globulin Ratio 


 


Urine Color  Yellow


 


Urine Clarity  Hazy


 


Urine pH  5.0


 


Ur Specific Gravity  1.023


 


Urine Protein  2+ H


 


Urine Glucose (UA)  Normal


 


Urine Ketones  Negative


 


Urine Blood  Negative


 


Urine Nitrate  Negative


 


Urine Bilirubin  Negative


 


Urine Urobilinogen  4.0


 


Ur Leukocyte Esterase  Neg


 


Urine WBC (Auto)  2


 


Urine RBC (Auto)  1


 


Ur Squamous Epith Cells  < 1


 


Hyaline Casts  0-2














Assessment & Plan





- Assessment and Plan (Free Text)


Assessment: 





Patient with history of CAD CHF  with recnt admission for Pneumonia and now for 

ascites withno fever-for further evaluation -tap, hepatitis profile, check for 

other viral infection , 


with liver cirrhosis- etiology? GI consult 


history of CAD and CHF -will notify his cardio 


elevated WBC , afebrile, non toxic- will repeat 


Elevated PT with normal PTT- no bleeding - monitor





GI prophylaxis


DVT prophylaxis

## 2018-03-26 NOTE — RAD
PROCEDURE:  CHEST RADIOGRAPH, 1 VIEW



HISTORY:

abdominal discomfort



COMPARISON:

Chest radiograph dated 01/25/2018.



FINDINGS:



LUNGS:

Clear.



PLEURA:

No pneumothorax or pleural fluid seen.



CARDIOVASCULAR:

Left subclavian access AICD/ pacemaker redemonstrated.  

Cardiomediastinal silhouette stably enlarged.



OSSEOUS STRUCTURES:

Changed.



VISUALIZED UPPER ABDOMEN:

Normal.



OTHER FINDINGS:

None. 



IMPRESSION:

No active disease.

## 2018-03-26 NOTE — CT
PROCEDURE:  CT scan abdomen pelvis dated 03/26/2018 



HISTORY:

Abdominal discomfort with mild distention



COMPARISON:

No prior study available for comparison



TECHNIQUE:

Contiguous axial images of the abdomen pelvis performed all without 

oral or intravenous contrast material.  Additional 2 dimensional 

sagittal and coronal reformats generated. 



Radiation dose:



Total exam DLP =  



This CT exam was performed using one or more of the following dose 

reduction techniques: Automated exposure control, adjustment of the 

mA and/or kV according to patient size, and/or use of iterative 

reconstruction technique. .



FINDINGS:



LOWER THORAX:

Heart is enlarged.  No significant pericardial effusion. There is a 

small of right-sided pleural effusion and mild associate right 

basilar atelectasis. No evidence of basilar pneumothorax.



LIVER:

The liver exhibits normal size measuring approximately 17.2 cm in CC 

dimension. Questionable nodular hepatic surface contour ; rule out 

cirrhosis. No obvious hepatic mass or collection seen on this 

noncontrast study. 



There is a moderate to large amount of abdominal and pelvic ascites.



GALLBLADDER AND BILE DUCTS:

Cholelithiasis. There appears to be a small amount of pericholecystic 

fluid secondary to ascites fluid. 



PANCREAS:

Pancreas appears slightly atrophic and fatty replaced.







SPLEEN:

Unremarkable. No splenomegaly. 



ADRENALS:

Mildly enlarged on left adrenal gland. 



KIDNEYS AND URETERS:

Kidneys demonstrate relatively symmetric size.  No evidence of 

nephrolithiasis or hydronephrosis. There is an approximately 3.3 cm 

cyst extending from the inferior aspect of the lower pole right 

kidney which appears to extend inferiorly and is partially exophytic 

with extension into the inferior aspect of the left pelvis. Mild 

infiltration changes at present in the perinephric fat bilaterally 

left more so than right nonspecific.  Rule out UTI. 



BLADDER:

Urinary bladder incompletely distended which in part accounts for 

thick-walled appearance.  Muscular hypertrophy presumably 

contributes.  Possibility of a cystitis or other intrinsic/ invasive 

wall lesion should be excluded.



REPRODUCTIVE:

Prostate gland measures approximately 3.9 cm in transverse dimension. 

 Prostatic calcifications are present. 



APPENDIX:

Appendix not seen with however what probably represents normal 

appendix of best seen on coronal image number [id number] and axial image 

number [id number]. Certainty. Ascites fluid also limits evaluation for 

appendicitis. 



BOWEL:

Evaluation of the bowel is limited due to the lack of oral contrast 

material as well as abdominal ascites. Stomach is incompletely 

distended which presumably in part accounts for thick-walled 

appearance.  Possibility of this gastritis not excluded. .  Stool and 

air seen throughout the large bowel. There does appear to be a few 

scattered colonic diverticula however no definitive radiographic 

evidence acute diverticulitis ; ascites fluid however limits 

evaluation for S diverticulitis. 



PERITONEUM:

No gross free intraperitoneal air abdominal and pelvic ascites as 

above



LYMPH NODES:

Unremarkable. No enlarged lymph nodes. 



VASCULATURE:

No definitive evidence of abdominal aortic or iliac artery aneurysm 

seen. 



BONES:

Minor multilevel degenerative spondylosis of the lower thoracic and 

lumbar spine.



OTHER FINDINGS:

None. 



IMPRESSION:

Small right-sided effusion with suspected minimal right basilar 

atelectasis. Cardiomegaly. 



Large amount of abdominal and pelvic ascites with what appears 

represent changes of some hepatic cirrhosis.  Clinical correlation 

recommended. 



Cholelithiasis. 



Left renal cyst. 



Infiltration changes seen in the perinephric fat bilaterally left 

greater than right.  The urinary bladder is also thick-walled in 

appearance in part due to underdistention and muscular hypertrophy 

however the possibility of a cystitis with ascending UTI not 

excluded.  Clinical correlation recommended

## 2018-03-27 LAB
BASOPHILS # BLD AUTO: 0.1 K/UL (ref 0–0.2)
BASOPHILS NFR BLD: 0.8 % (ref 0–2)
EOSINOPHIL # BLD AUTO: 0.1 K/UL (ref 0–0.7)
EOSINOPHIL NFR BLD: 1.2 % (ref 0–4)
ERYTHROCYTE [DISTWIDTH] IN BLOOD BY AUTOMATED COUNT: 15.9 % (ref 11.5–14.5)
HEPATITIS A IGM: NEGATIVE
HEPATITIS B CORE AB: NEGATIVE
HEPATITIS C ANTIBODY: NEGATIVE
HGB BLD-MCNC: 12.5 G/DL (ref 12–18)
LYMPHOCYTES # BLD AUTO: 2.1 K/UL (ref 1–4.3)
LYMPHOCYTES NFR BLD AUTO: 22.1 % (ref 20–40)
MCH RBC QN AUTO: 29.4 PG (ref 27–31)
MCHC RBC AUTO-ENTMCNC: 32.8 G/DL (ref 33–37)
MCV RBC AUTO: 89.7 FL (ref 80–94)
MONOCYTES # BLD: 1.2 K/UL (ref 0–0.8)
MONOCYTES NFR BLD: 12.2 % (ref 0–10)
NEUTROPHILS # BLD: 6.1 K/UL (ref 1.8–7)
NEUTROPHILS NFR BLD AUTO: 63.7 % (ref 50–75)
NRBC BLD AUTO-RTO: 0.1 % (ref 0–2)
PLATELET # BLD: 151 K/UL (ref 130–400)
PMV BLD AUTO: 10.1 FL (ref 7.2–11.7)
RBC # BLD AUTO: 4.26 MIL/UL (ref 4.4–5.9)
WBC # BLD AUTO: 9.6 K/UL (ref 4.8–10.8)

## 2018-03-27 RX ADMIN — OXYMETAZOLINE HYDROCHLORIDE SCH SPRAY: 0.05 SPRAY NASAL at 22:23

## 2018-03-27 RX ADMIN — OXYMETAZOLINE HYDROCHLORIDE SCH: 0.05 SPRAY NASAL at 14:19

## 2018-03-27 RX ADMIN — PANTOPRAZOLE SODIUM SCH MG: 40 TABLET, DELAYED RELEASE ORAL at 09:34

## 2018-03-27 NOTE — CP.PCM.PN
Subjective





- Date & Time of Evaluation


Date of Evaluation: 03/27/18


Time of Evaluation: 10:15





- Subjective


Subjective: 





patient seen, ambulatory, very cooperative , conversant , just got from US and 

pending 2D echo- very much aware of condition and plan


discussion of ascites and liver


discussed with Gastro 


currently no com[plaints ,feel good 





Objective





- Vital Signs/Intake and Output


Vital Signs (last 24 hours): 


 











Temp Pulse Resp BP Pulse Ox


 


 98.7 F   61   20   114/75   98 


 


 03/27/18 08:00  03/27/18 08:00  03/27/18 08:00  03/27/18 09:33  03/27/18 08:00








Intake and Output: 


 











 03/27/18 03/27/18





 06:59 18:59


 


Intake Total 240 


 


Balance 240 














- Medications


Medications: 


 Current Medications





Aspirin (Aspirin Chewable)  81 mg PO DAILY [facility]


   Last Admin: 03/27/18 09:34 Dose:  81 mg


Carvedilol (Coreg)  3.125 mg PO BID [facility]


   Last Admin: 03/27/18 09:34 Dose:  3.125 mg


Furosemide (Lasix)  20 mg IVP BID [facility]


   Last Admin: 03/27/18 09:33 Dose:  20 mg


Heparin Sodium (Porcine) (Heparin)  5,000 units SC Q8 [facility]


Losartan Potassium (Cozaar)  50 mg PO DAILY [facility]


   Last Admin: 03/27/18 09:34 Dose:  50 mg


Pantoprazole Sodium (Protonix Ec Tab)  40 mg PO DAILY [facility]


   Last Admin: 03/27/18 09:34 Dose:  40 mg











- Labs


Labs: 


 





 03/27/18 08:21 





 03/26/18 13:43 





 











PT  15.2 SECONDS (9.7-12.2)  H  03/26/18  13:43    


 


INR  1.3   03/26/18  13:43    


 


APTT  29 SECONDS (21-34)   03/26/18  13:43    














- Constitutional


Appears: No Acute Distress





- Head Exam


Head Exam: ATRAUMATIC, NORMOCEPHALIC





- Eye Exam


Eye Exam: Normal appearance.  absent: Nystagmus, Scleral icterus





- ENT Exam


ENT Exam: Mucous Membranes Moist





- Neck Exam


Neck Exam: Full ROM.  absent: Tenderness





- Respiratory Exam


Respiratory Exam: Clear to Ausculation Bilateral, NORMAL BREATHING PATTERN





- Cardiovascular Exam


Cardiovascular Exam: REGULAR RHYTHM





- GI/Abdominal Exam


GI & Abdominal Exam: Distended, Soft, Hypoactive Bowel Sounds.  absent: 

Tenderness





- Back Exam


Back Exam: Full ROM.  absent: rash noted, tenderness





- Neurological Exam


Neurological Exam: Alert, Awake, Normal Gait, Oriented x3





- Psychiatric Exam


Psychiatric exam: Normal Affect, Normal Mood





- Skin


Skin Exam: Intact, Normal Color





Assessment and Plan


(1) Ascites of liver


Assessment & Plan: 


new onset ascites- with slight abdominal pain- Gastro evaluation- for centesis


Status: Acute   





(2) Cirrhosis of liver


Assessment & Plan: 


etiology?= unremarkable laboraty tests discussed with patie=nt who is non 

alcoholic 


Status: Acute   





(3) CHF (congestive heart failure)


Assessment & Plan: 


on lasix- under cardio care , undergoing further evali=uation


Status: Acute   





(4) Coronary artery disease


Assessment & Plan: 


s/p stent - stable 


Status: Acute   





(5) Hypertension


Assessment & Plan: 


controlled on meds 


Status: Chronic   





- Assessment and Plan (Free Text)


Assessment: 





Patient admitted for Ascites , found to have liver cirrhosis, non alcoholic, 

with unremarkable laboratory tests, discussion with gastro, for paracentesis

## 2018-03-27 NOTE — CP.PCM.CON
History of Present Illness





- History of Present Illness


History of Present Illness: 





Patient seen and evaluated


Wife at bedside





Admitted for Ascites and pedal edema


Check ECHO


Lasix Prn





GI consult





Past Patient History





- Infectious Disease


Hx of Infectious Diseases: None





- Past Medical History & Family History


Past Medical History?: Yes





- Past Social History


Smoking Status: Former Smoker





- CARDIAC


Hx Congestive Heart Failure: Yes





- PULMONARY


Hx Respiratory Disorders: No





- NEUROLOGICAL


Hx Neurological Disorder: No





- HEENT


Hx HEENT Problems: No





- RENAL


Hx Chronic Kidney Disease: No





- ENDOCRINE/METABOLIC


Hx Endocrine Disorders: No





- HEMATOLOGICAL/ONCOLOGICAL


Hx Blood Disorders: No





- INTEGUMENTARY


Hx Dermatological Problems: No





- MUSCULOSKELETAL/RHEUMATOLOGICAL


Hx Falls: Yes (broken ankle 1999)





- GASTROINTESTINAL


Hx Gastrointestinal Disorders: No





- GENITOURINARY/GYNECOLOGICAL


Hx Genitourinary Disorders: No





- PSYCHIATRIC


Hx Substance Use: No





- SURGICAL HISTORY


Hx Cardiac Catheterization: Yes


Hx Orthopedic Surgery: Yes (Broken ankle, screws in place 1999)





- ANESTHESIA


Hx Anesthesia: Yes


Hx Anesthesia Reactions: No


Hx Malignant Hyperthermia: No





Meds


Allergies/Adverse Reactions: 


 Allergies











Allergy/AdvReac Type Severity Reaction Status Date / Time


 


No Known Allergies Allergy   Verified 01/23/18 17:19














- Medications


Medications: 


 Current Medications





Aspirin (Aspirin Chewable)  81 mg PO DAILY [facility]


Carvedilol (Coreg)  3.125 mg PO BID [facility]


   Last Admin: 03/26/18 21:25 Dose:  3.125 mg


Losartan Potassium (Cozaar)  50 mg PO DAILY [facility]


Pantoprazole Sodium (Protonix Ec Tab)  40 mg PO DAILY [facility]


Rosuvastatin Calcium (Crestor)  10 mg PO HS [facility]


   Last Admin: 03/26/18 21:23 Dose:  10 mg











Results





- Vital Signs


Recent Vital Signs: 


 Last Vital Signs











Temp  97.8 F   03/27/18 00:00


 


Pulse  68   03/27/18 00:00


 


Resp  20   03/27/18 00:00


 


BP  150/90   03/27/18 00:00


 


Pulse Ox  98   03/27/18 00:00














- Labs


Result Diagrams: 


 03/26/18 13:43





 03/26/18 13:43


Labs: 


 Laboratory Results - last 24 hr











  03/26/18 03/26/18 03/26/18





  13:43 13:43 13:43


 


WBC  12.2 H D  


 


RBC  4.40  


 


Hgb  12.8  


 


Hct  39.4  


 


MCV  89.7  D  


 


MCH  29.1  


 


MCHC  32.4 L  


 


RDW  16.1 H  


 


Plt Count  160  


 


MPV  10.2  


 


Neut % (Auto)  66.7  


 


Lymph % (Auto)  21.1  


 


Mono % (Auto)  10.7 H  


 


Eos % (Auto)  0.6  


 


Baso % (Auto)  0.9  


 


Neut # (Auto)  8.1 H  


 


Lymph # (Auto)  2.6  


 


Mono # (Auto)  1.3 H  


 


Eos # (Auto)  0.1  


 


Baso # (Auto)  0.1  


 


PT   15.2 H 


 


INR   1.3 


 


APTT   29 


 


Sodium    143


 


Potassium    3.9


 


Chloride    108 H


 


Carbon Dioxide    22


 


Anion Gap    17


 


BUN    33 H


 


Creatinine    1.5


 


Est GFR ( Amer)    58


 


Est GFR (Non-Af Amer)    48


 


Random Glucose    94


 


Calcium    8.5 L


 


Magnesium    1.8


 


Total Bilirubin    1.1


 


AST    32


 


ALT    32


 


Alkaline Phosphatase    111


 


Total Creatine Kinase    128


 


CK-MB (Mass)    1.56


 


Troponin I    0.0220


 


NT-Pro-B Natriuret Pep    4460 H


 


Total Protein    6.6


 


Albumin    3.6


 


Globulin    3.1


 


Albumin/Globulin Ratio    1.2


 


Urine Color   


 


Urine Clarity   


 


Urine pH   


 


Ur Specific Gravity   


 


Urine Protein   


 


Urine Glucose (UA)   


 


Urine Ketones   


 


Urine Blood   


 


Urine Nitrate   


 


Urine Bilirubin   


 


Urine Urobilinogen   


 


Ur Leukocyte Esterase   


 


Urine WBC (Auto)   


 


Urine RBC (Auto)   


 


Ur Squamous Epith Cells   


 


Hyaline Casts   














  03/26/18





  15:21


 


WBC 


 


RBC 


 


Hgb 


 


Hct 


 


MCV 


 


MCH 


 


MCHC 


 


RDW 


 


Plt Count 


 


MPV 


 


Neut % (Auto) 


 


Lymph % (Auto) 


 


Mono % (Auto) 


 


Eos % (Auto) 


 


Baso % (Auto) 


 


Neut # (Auto) 


 


Lymph # (Auto) 


 


Mono # (Auto) 


 


Eos # (Auto) 


 


Baso # (Auto) 


 


PT 


 


INR 


 


APTT 


 


Sodium 


 


Potassium 


 


Chloride 


 


Carbon Dioxide 


 


Anion Gap 


 


BUN 


 


Creatinine 


 


Est GFR ( Amer) 


 


Est GFR (Non-Af Amer) 


 


Random Glucose 


 


Calcium 


 


Magnesium 


 


Total Bilirubin 


 


AST 


 


ALT 


 


Alkaline Phosphatase 


 


Total Creatine Kinase 


 


CK-MB (Mass) 


 


Troponin I 


 


NT-Pro-B Natriuret Pep 


 


Total Protein 


 


Albumin 


 


Globulin 


 


Albumin/Globulin Ratio 


 


Urine Color  Yellow


 


Urine Clarity  Hazy


 


Urine pH  5.0


 


Ur Specific Gravity  1.023


 


Urine Protein  2+ H


 


Urine Glucose (UA)  Normal


 


Urine Ketones  Negative


 


Urine Blood  Negative


 


Urine Nitrate  Negative


 


Urine Bilirubin  Negative


 


Urine Urobilinogen  4.0


 


Ur Leukocyte Esterase  Neg


 


Urine WBC (Auto)  2


 


Urine RBC (Auto)  1


 


Ur Squamous Epith Cells  < 1


 


Hyaline Casts  0-2

## 2018-03-27 NOTE — CP.PCM.PN
<Edwin Antonio - Last Filed: 03/27/18 11:46>





Subjective





- Date & Time of Evaluation


Date of Evaluation: 03/27/18


Time of Evaluation: 09:20





- Subjective


Subjective: 





PGY2 Cardiology Progress Note for Dr. Suresh





Patient seen and examined at bedside. No acute distress. Denies chest pain or 

SOB. He reports that his R LE has been swollen. His L LE is not swollen. He 

also c/o several days of abdominal swelling. 12-point review of systems is 

otherwise negative without any additional acute complaints.








Objective





- Vital Signs/Intake and Output


Vital Signs (last 24 hours): 


 











Temp Pulse Resp BP Pulse Ox


 


 98.7 F   61   20   114/75   98 


 


 03/27/18 08:00  03/27/18 08:00  03/27/18 08:00  03/27/18 09:33  03/27/18 08:00








Intake and Output: 


 











 03/27/18 03/27/18





 06:59 18:59


 


Intake Total 240 


 


Balance 240 














- Medications


Medications: 


 Current Medications





Aspirin (Aspirin Chewable)  81 mg PO DAILY [facility]


   Last Admin: 03/27/18 09:34 Dose:  81 mg


Carvedilol (Coreg)  3.125 mg PO BID [facility]


   Last Admin: 03/27/18 09:34 Dose:  3.125 mg


Furosemide (Lasix)  20 mg IVP BID [facility]


   Last Admin: 03/27/18 09:33 Dose:  20 mg


Heparin Sodium (Porcine) (Heparin)  5,000 units SC Q8 [facility]


Losartan Potassium (Cozaar)  50 mg PO DAILY [facility]


   Last Admin: 03/27/18 09:34 Dose:  50 mg


Oxymetazoline HCl (Afrin 0.05%)  0 ml NS Q12H [facility]


Pantoprazole Sodium (Protonix Ec Tab)  40 mg PO DAILY [facility]


   Last Admin: 03/27/18 09:34 Dose:  40 mg











- Labs


Labs: 


 





 03/27/18 08:21 





 03/26/18 13:43 





 











PT  15.2 SECONDS (9.7-12.2)  H  03/26/18  13:43    


 


INR  1.3   03/26/18  13:43    


 


APTT  29 SECONDS (21-34)   03/26/18  13:43    














- Additional Findings


Additional findings: 





- Constitutional


Appears: Non-toxic, No Acute Distress





- Head Exam


Head Exam: ATRAUMATIC, NORMOCEPHALIC





- Eye Exam


Eye Exam: Normal appearance.  absent: Nystagmus





- ENT Exam


ENT Exam: Mucous Membranes Moist





- Neck Exam


Neck exam: Positive for: Full Rom





- Respiratory Exam


Respiratory Exam: Clear to Auscultation Bilateral, NORMAL BREATHING PATTERN





- Cardiovascular Exam


Cardiovascular Exam: RRR, +S1, +S2





- GI/Abdominal Exam


GI & Abdominal Exam: Distended, Hypoactive Bowel Sounds, Soft, Tenderness (

vague )





- Extremities Exam


Extremities exam: Positive for: full ROM, normal inspection.  Negative for: 

tenderness


Note: R LE w/pitting edema from ankle to knee


   L LE no edema (normal)





- Neurological Exam


Neurological exam: Alert, Normal Gait, Oriented x3





- Psychiatric Exam


Psychiatric exam: Normal Affect, Normal Mood





- Skin


Skin Exam: Intact, Normal Color





Assessment and Plan





- Assessment and Plan (Free Text)


Assessment: 





Coronary artery disease


3/27: pt is comfortable, no chest pain / SOB


Continue ASA 81


s/p stents


s/p pacemaker


Pt reports plavix as outpt





Congestive Heart Failure


3/27: Lasix 20mg BID IVP


ECHO 1/23/18- EF 19% with diffuse hyokenesis, mild MR, inc left atrial pressure

, pulm HTN (moderate), restrictive diastolic dysfunction; see full report





Hypertension


3/27: BP WNL, continue to monitor


Continue Coreg 3.125mg PO BID


Continue Losartan 50mg PO qD





Ascites


3/27: Lasix 20mg BID IVP


f/u LE duplex


CT Abd/pelv- cardiomegally, abd/pelvic ascites, hepatic cirrhosis


New onset:


Consider CIRRHOSIS- as CT shows nodular liver- but LFTs are nl, and INR is ok.  

PLTs are 160k.  Denies etoh and hep profile is neg.


Consider cardiac, renal, malignancy.


Rec: paracentesis, labs- Fe ferritin, echo.








Case Discussed with Dr. Kerwin Antonio, PGY2











<Rashaad Suresh - Last Filed: 03/27/18 23:18>





Objective





- Vital Signs/Intake and Output


Vital Signs (last 24 hours): 


 











Temp Pulse Resp BP Pulse Ox


 


 97.6 F   67   18   142/94 H  97 


 


 03/27/18 19:16  03/27/18 19:16  03/27/18 19:16  03/27/18 19:16  03/27/18 19:16








Intake and Output: 


 











 03/27/18 03/28/18





 18:59 06:59


 


Intake Total 360 450


 


Balance 360 450














- Medications


Medications: 


 Current Medications





Aspirin (Aspirin Chewable)  81 mg PO DAILY [facility]


   Last Admin: 03/27/18 09:34 Dose:  81 mg


Carvedilol (Coreg)  3.125 mg PO BID [facility]


   Last Admin: 03/27/18 18:34 Dose:  3.125 mg


Furosemide (Lasix)  40 mg IVP BID [facility]


   Last Admin: 03/27/18 18:35 Dose:  40 mg


Heparin Sodium (Porcine) (Heparin)  5,000 units SC Q8 [facility]


   Last Admin: 03/27/18 21:50 Dose:  5,000 units


Losartan Potassium (Cozaar)  50 mg PO DAILY [facility]


   Last Admin: 03/27/18 09:34 Dose:  50 mg


Oxymetazoline HCl (Afrin 0.05%)  0 ml NS Q12H [facility]


   Last Admin: 03/27/18 22:23 Dose:  1 spray


Pantoprazole Sodium (Protonix Ec Tab)  40 mg PO DAILY [facility]


   Last Admin: 03/27/18 09:34 Dose:  40 mg


Rosuvastatin Calcium (Crestor)  2.5 mg PO HS [facility]


   Last Admin: 03/27/18 22:33 Dose:  2.5 mg











- Labs


Labs: 


 





 03/27/18 08:21 





 03/26/18 13:43 





 











PT  15.2 SECONDS (9.7-12.2)  H  03/26/18  13:43    


 


INR  1.3   03/26/18  13:43    


 


APTT  29 SECONDS (21-34)   03/26/18  13:43    














Assessment and Plan





- Assessment and Plan (Free Text)


Plan: 





Patient seen and evaluated personally by me


Plan of care d/w the medical resident and as documented

## 2018-03-27 NOTE — VASCLAB
PROCEDURE:  Lower Extremity Venous Duplex Exam.



HISTORY:

Edema, r/o DVT 



PRIORS:

None. 



TECHNIQUE:

Bilateral common femoral, femoral, popliteal and posterior tibial, 

peroneal and great saphenous veins were evaluated. Flow was assessed 

with color Doppler, compressibility, assessment of phasic flow and 

augmentation response.



Report prepared by   DUSTY Ng



FINDINGS:



RIGHT:

1. Common Femoral Vein: 



1.1. Compressibility - Fully compressible: Thrombus -  None : Flow - 

Phasic: Augmentation -Normal: Reflux - None.



2. Femoral Vein:



2.1. Compressibility - Fully compressible: Thrombus -  None : Flow - 

Phasic: Augmentation -Normal: Reflux - None.



3. Popliteal Vein: 



3.1. Compressibility - Fully compressible: Thrombus - None :  Flow - 

Phasic: Augmentation -Normal: Reflux - None.



4. Posterior Tibial Vein: 



4.1. Compressibility - Fully compressible: Thrombus -  None: Flow - 

Phasic: Augmentation -Normal: Reflux - None.



5. Peroneal Vein:



5.1. Compressibility - Fully compressible: Thrombus -  None: Flow - 

Phasic: Augmentation -Normal: Reflux - None.



6. Great Saphenous Vein:

6.1. Compressibility - Fully compressible: Thrombus - None: Flow - 

Phasic: Augmentation - Normal: Reflux - None.





LEFT:

1. Common Femoral Vein:



1.1.  Compressibility - Fully compressible: Thrombus -  None: Flow - 

Phasic: Augmentation -Normal: Reflux - None.



2. Femoral Vein:



2.1.  Compressibility - Fully compressible: Thrombus -  None: Flow - 

Phasic: Augmentation -Normal: Reflux - None.



3. Popliteal Vein:



3.1.  Compressibility - Fully compressible: Thrombus -  None : Flow - 

Phasic: Augmentation -Normal: Reflux - None.



4. Posterior Tibial Vein:



4.1.  Compressibility - Fully compressible: Thrombus -  None: Flow - 

Phasic: Augmentation -Normal: Reflux - None.



5. Peroneal Vein:



5.1.  Compressibility - Fully compressible: Thrombus -  None: Flow - 

Phasic: Augmentation -Normal: Reflux - None.



6. Great Saphenous Vein:

6.1.  Compressibility - Fully compressible: Thrombus -  None: Flow - 

Phasic: Augmentation - Normal: Reflux - None.





OTHER FINDINGS:  Right: None significant.



Left: None significant.



IMPRESSION:

Right: 



No evidence of deep or superficial vein thrombosis of the right lower 

extremity. Normal valve function noted of the right side.     



Left: 



No evidence of deep or superficial vein thrombosis of the left lower 

extremity. Normal valve function noted of the left side.

## 2018-03-27 NOTE — CP.PCM.CON
History of Present Illness





- History of Present Illness


History of Present Illness: 





56 yo male h/o CAD, stents. HTN.CHF, pneumonia 2 mo ago:


reports inc abd girth and lE edema x 1 week and sl SOB.


Denies liver dis, alcohol abuse, hepatitis, wt loss, RB, melena.


Reports neg colonsocopy 7 yrs ago


Pt reprots Plavix- but its not on med list.





Review of Systems





- Constitutional


Constitutional: absent: Anorexia





- EENT


Eyes: absent: Photophobia





- Cardiovascular


Cardiovascular: Dyspnea.  absent: Chest Pain





- Respiratory


Respiratory: Dyspnea.  absent: Cough, Hemoptysis, Wheezing





- Gastrointestinal


Gastrointestinal: Abdominal Pain, Bloating.  absent: Coffee Ground Emesis, 

Constipation, Diarrhea, Dysphagia, Hematemesis, Hematochezia, Melena, Nausea, 

Vomiting





- Genitourinary


Genitourinary: absent: Hematuria





- Musculoskeletal


Musculoskeletal: absent: Arthralgias





- Integumentary


Integumentary: absent: Jaundice





- Neurological


Neurological: As Per HPI.  absent: Convulsions





Past Patient History





- Infectious Disease


Hx of Infectious Diseases: None





- Past Medical History & Family History


Past Medical History?: Yes





- Past Social History


Smoking Status: Former Smoker





- CARDIAC


Hx Congestive Heart Failure: Yes





- PULMONARY


Hx Respiratory Disorders: No





- NEUROLOGICAL


Hx Neurological Disorder: No





- HEENT


Hx HEENT Problems: No





- RENAL


Hx Chronic Kidney Disease: No





- ENDOCRINE/METABOLIC


Hx Endocrine Disorders: No





- HEMATOLOGICAL/ONCOLOGICAL


Hx Blood Disorders: No





- INTEGUMENTARY


Hx Dermatological Problems: No





- MUSCULOSKELETAL/RHEUMATOLOGICAL


Hx Falls: Yes (broken ankle 1999)





- GASTROINTESTINAL


Hx Gastrointestinal Disorders: No





- GENITOURINARY/GYNECOLOGICAL


Hx Genitourinary Disorders: No





- PSYCHIATRIC


Hx Substance Use: No





- SURGICAL HISTORY


Hx Cardiac Catheterization: Yes


Hx Orthopedic Surgery: Yes (Broken ankle, screws in place 1999)





- ANESTHESIA


Hx Anesthesia: Yes


Hx Anesthesia Reactions: No


Hx Malignant Hyperthermia: No





Meds


Allergies/Adverse Reactions: 


 Allergies











Allergy/AdvReac Type Severity Reaction Status Date / Time


 


No Known Allergies Allergy   Verified 01/23/18 17:19














- Medications


Medications: 


 Current Medications





Aspirin (Aspirin Chewable)  81 mg PO DAILY [facility]


   Last Admin: 03/27/18 09:34 Dose:  81 mg


Carvedilol (Coreg)  3.125 mg PO BID [facility]


   Last Admin: 03/27/18 09:34 Dose:  3.125 mg


Furosemide (Lasix)  20 mg IVP BID [facility]


   Last Admin: 03/27/18 09:33 Dose:  20 mg


Heparin Sodium (Porcine) (Heparin)  5,000 units SC Q8 [facility]


Losartan Potassium (Cozaar)  50 mg PO DAILY [facility]


   Last Admin: 03/27/18 09:34 Dose:  50 mg


Pantoprazole Sodium (Protonix Ec Tab)  40 mg PO DAILY [facility]


   Last Admin: 03/27/18 09:34 Dose:  40 mg











Physical Exam





- Constitutional


Appears: Non-toxic





- Head Exam


Head Exam: ATRAUMATIC





- Eye Exam


Eye Exam: PERRL





- Neck Exam


Neck exam: Negative for: Tenderness





- Respiratory Exam


Respiratory Exam: Rales





- Cardiovascular Exam


Cardiovascular Exam: RRR





- GI/Abdominal Exam


GI & Abdominal Exam: Distended, Normal Bowel Sounds, Soft.  absent: Mass, 

Tenderness





- Extremities Exam


Extremities exam: Positive for: pedal edema





- Neurological Exam


Neurological exam: Alert, Oriented x3





Results





- Vital Signs


Recent Vital Signs: 


 Last Vital Signs











Temp  98.7 F   03/27/18 08:00


 


Pulse  61   03/27/18 08:00


 


Resp  20   03/27/18 08:00


 


BP  114/75   03/27/18 09:33


 


Pulse Ox  98   03/27/18 08:00














- Labs


Result Diagrams: 


 03/27/18 08:21





 03/26/18 13:43


Labs: 


 Laboratory Results - last 24 hr











  03/26/18 03/26/18 03/26/18





  13:43 13:43 13:43


 


WBC  12.2 H D  


 


RBC  4.40  


 


Hgb  12.8  


 


Hct  39.4  


 


MCV  89.7  D  


 


MCH  29.1  


 


MCHC  32.4 L  


 


RDW  16.1 H  


 


Plt Count  160  


 


MPV  10.2  


 


Neut % (Auto)  66.7  


 


Lymph % (Auto)  21.1  


 


Mono % (Auto)  10.7 H  


 


Eos % (Auto)  0.6  


 


Baso % (Auto)  0.9  


 


Neut # (Auto)  8.1 H  


 


Lymph # (Auto)  2.6  


 


Mono # (Auto)  1.3 H  


 


Eos # (Auto)  0.1  


 


Baso # (Auto)  0.1  


 


PT   15.2 H 


 


INR   1.3 


 


APTT   29 


 


Sodium    143


 


Potassium    3.9


 


Chloride    108 H


 


Carbon Dioxide    22


 


Anion Gap    17


 


BUN    33 H


 


Creatinine    1.5


 


Est GFR ( Amer)    58


 


Est GFR (Non-Af Amer)    48


 


Random Glucose    94


 


Calcium    8.5 L


 


Magnesium    1.8


 


Total Bilirubin    1.1


 


AST    32


 


ALT    32


 


Alkaline Phosphatase    111


 


Total Creatine Kinase    128


 


CK-MB (Mass)    1.56


 


Troponin I    0.0220


 


NT-Pro-B Natriuret Pep    4460 H


 


Total Protein    6.6


 


Albumin    3.6


 


Globulin    3.1


 


Albumin/Globulin Ratio    1.2


 


Urine Color   


 


Urine Clarity   


 


Urine pH   


 


Ur Specific Gravity   


 


Urine Protein   


 


Urine Glucose (UA)   


 


Urine Ketones   


 


Urine Blood   


 


Urine Nitrate   


 


Urine Bilirubin   


 


Urine Urobilinogen   


 


Ur Leukocyte Esterase   


 


Urine WBC (Auto)   


 


Urine RBC (Auto)   


 


Ur Squamous Epith Cells   


 


Hyaline Casts   


 


Hepatitis A IgM Ab   


 


Hep Bs Antigen   


 


Hep B Core IgM Ab   


 


Hepatitis C Antibody   


 


HIV 1&2 Antibody Screen   














  03/26/18 03/27/18 03/27/18





  15:21 08:21 08:21


 


WBC   9.6 


 


RBC   4.26 L 


 


Hgb   12.5 


 


Hct   38.3 


 


MCV   89.7 


 


MCH   29.4 


 


MCHC   32.8 L 


 


RDW   15.9 H 


 


Plt Count   151 


 


MPV   10.1 


 


Neut % (Auto)   63.7 


 


Lymph % (Auto)   22.1 


 


Mono % (Auto)   12.2 H 


 


Eos % (Auto)   1.2 


 


Baso % (Auto)   0.8 


 


Neut # (Auto)   6.1 


 


Lymph # (Auto)   2.1 


 


Mono # (Auto)   1.2 H 


 


Eos # (Auto)   0.1 


 


Baso # (Auto)   0.1 


 


PT   


 


INR   


 


APTT   


 


Sodium   


 


Potassium   


 


Chloride   


 


Carbon Dioxide   


 


Anion Gap   


 


BUN   


 


Creatinine   


 


Est GFR ( Amer)   


 


Est GFR (Non-Af Amer)   


 


Random Glucose   


 


Calcium   


 


Magnesium   


 


Total Bilirubin   


 


AST   


 


ALT   


 


Alkaline Phosphatase   


 


Total Creatine Kinase   


 


CK-MB (Mass)   


 


Troponin I   


 


NT-Pro-B Natriuret Pep   


 


Total Protein   


 


Albumin   


 


Globulin   


 


Albumin/Globulin Ratio   


 


Urine Color  Yellow  


 


Urine Clarity  Hazy  


 


Urine pH  5.0  


 


Ur Specific Gravity  1.023  


 


Urine Protein  2+ H  


 


Urine Glucose (UA)  Normal  


 


Urine Ketones  Negative  


 


Urine Blood  Negative  


 


Urine Nitrate  Negative  


 


Urine Bilirubin  Negative  


 


Urine Urobilinogen  4.0  


 


Ur Leukocyte Esterase  Neg  


 


Urine WBC (Auto)  2  


 


Urine RBC (Auto)  1  


 


Ur Squamous Epith Cells  < 1  


 


Hyaline Casts  0-2  


 


Hepatitis A IgM Ab    Negative


 


Hep Bs Antigen    Negative


 


Hep B Core IgM Ab    Negative


 


Hepatitis C Antibody    Negative


 


HIV 1&2 Antibody Screen   














  03/27/18





  08:21


 


WBC 


 


RBC 


 


Hgb 


 


Hct 


 


MCV 


 


MCH 


 


MCHC 


 


RDW 


 


Plt Count 


 


MPV 


 


Neut % (Auto) 


 


Lymph % (Auto) 


 


Mono % (Auto) 


 


Eos % (Auto) 


 


Baso % (Auto) 


 


Neut # (Auto) 


 


Lymph # (Auto) 


 


Mono # (Auto) 


 


Eos # (Auto) 


 


Baso # (Auto) 


 


PT 


 


INR 


 


APTT 


 


Sodium 


 


Potassium 


 


Chloride 


 


Carbon Dioxide 


 


Anion Gap 


 


BUN 


 


Creatinine 


 


Est GFR ( Amer) 


 


Est GFR (Non-Af Amer) 


 


Random Glucose 


 


Calcium 


 


Magnesium 


 


Total Bilirubin 


 


AST 


 


ALT 


 


Alkaline Phosphatase 


 


Total Creatine Kinase 


 


CK-MB (Mass) 


 


Troponin I 


 


NT-Pro-B Natriuret Pep 


 


Total Protein 


 


Albumin 


 


Globulin 


 


Albumin/Globulin Ratio 


 


Urine Color 


 


Urine Clarity 


 


Urine pH 


 


Ur Specific Gravity 


 


Urine Protein 


 


Urine Glucose (UA) 


 


Urine Ketones 


 


Urine Blood 


 


Urine Nitrate 


 


Urine Bilirubin 


 


Urine Urobilinogen 


 


Ur Leukocyte Esterase 


 


Urine WBC (Auto) 


 


Urine RBC (Auto) 


 


Ur Squamous Epith Cells 


 


Hyaline Casts 


 


Hepatitis A IgM Ab 


 


Hep Bs Antigen 


 


Hep B Core IgM Ab 


 


Hepatitis C Antibody 


 


HIV 1&2 Antibody Screen  Negative














Assessment & Plan


(1) Coronary artery disease


Assessment and Plan: 


stents.


Pt reports plavix as outpt


Status: Acute   





(2) CHF (congestive heart failure)


Status: Acute   





(3) Ascites


Assessment and Plan: 


New onset:


Consider CIRRHOSIS- as CT shows nodular liver- but LFTs are nl, and INR is ok.  

PLTs are 160k.  Denies etoh and hep profile is neg.


Consider cardiac, renal, malignancy.


Rec: paracentesis, labs- Fe ferritin, echo.





Status: Acute   





(4) Pneumonia


Assessment and Plan: 


1./18


Status: Acute   





(5) Hypertension


Status: Chronic

## 2018-03-28 VITALS — RESPIRATION RATE: 20 BRPM

## 2018-03-28 LAB
% IRON SATURATION: 7 (ref 20–55)
APPEARANCE FLD: (no result)
BODY FLD TYPE: (no result)
BODY FLUID MONO/MACROPHAGE: 5 % (ref 0–0)
BUN SERPL-MCNC: 29 MG/DL (ref 9–20)
CALCIUM SERPL-MCNC: 8.1 MG/DL (ref 8.6–10.4)
FERRITIN SERPL-MCNC: 26.2 NG/ML
GFR NON-AFRICAN AMERICAN: 57
IRON SERPL-MCNC: 29 UG/DL (ref 49–181)
TIBC SERPL-MCNC: 392 UG/DL (ref 250–450)
WBC # FLD: 375 /MM3 (ref 0–300)

## 2018-03-28 RX ADMIN — PANTOPRAZOLE SODIUM SCH MG: 40 TABLET, DELAYED RELEASE ORAL at 10:16

## 2018-03-28 RX ADMIN — OXYMETAZOLINE HYDROCHLORIDE SCH: 0.05 SPRAY NASAL at 11:36

## 2018-03-28 NOTE — PCM.SURG1
Surgeon's Initial Post Op Note





- Surgeon's Notes


Surgeon: Oni Vazquez MD


Assistant: NONE


Type of Anesthesia: Local


Pre-Operative Diagnosis: Ascites


Operative Findings: US showed small amount of ascites


Post-Operative Diagnosis: Ascites


Operation Performed: US guided paracentesis


Specimen/Specimens Removed: 600 cc of straw colored fluid


Estimated Blood Loss: EBL {In ML}: 0


Blood Products Given: N/A


Drains Used: No Drains


Post-Op Condition: Fair


Date of Surgery/Procedure: 03/28/18


Time of Surgery/Procedure: 10:55

## 2018-03-28 NOTE — CP.PCM.PN
Subjective





- Date & Time of Evaluation


Date of Evaluation: 03/28/18


Time of Evaluation: 01:00





- Subjective


Subjective: 





patient seen- had paracentesis - no complaints wants to go home


no fever 


aware of condition and plan


is ambulatory . on same medications 








Objective





- Vital Signs/Intake and Output


Vital Signs (last 24 hours): 


 











Temp Pulse Resp BP Pulse Ox


 


 97.8 F   103 H  20   126/83   97 


 


 03/28/18 08:34  03/28/18 08:34  03/28/18 08:34  03/28/18 10:16  03/28/18 08:34








Intake and Output: 


 











 03/28/18 03/28/18





 06:59 18:59


 


Intake Total 450 240


 


Balance 450 240














- Medications


Medications: 


 Current Medications





Aspirin (Aspirin Chewable)  81 mg PO DAILY [facility]


   Last Admin: 03/28/18 11:34 Dose:  81 mg


Carvedilol (Coreg)  3.125 mg PO BID [facility]


   Last Admin: 03/28/18 10:16 Dose:  3.125 mg


Furosemide (Lasix)  40 mg IVP BID [facility]


   Last Admin: 03/28/18 10:16 Dose:  40 mg


Heparin Sodium (Porcine) (Heparin)  5,000 units SC Q8 [facility]


   Last Admin: 03/28/18 05:57 Dose:  5,000 units


Losartan Potassium (Cozaar)  50 mg PO DAILY [facility]


   Last Admin: 03/28/18 10:16 Dose:  50 mg


Oxymetazoline HCl (Afrin 0.05%)  0 ml NS Q12H [facility]


   Last Admin: 03/28/18 11:36 Dose:  Not Given


Pantoprazole Sodium (Protonix Ec Tab)  40 mg PO DAILY [facility]


   Last Admin: 03/28/18 10:16 Dose:  40 mg


Rosuvastatin Calcium (Crestor)  2.5 mg PO HS [facility]


   Last Admin: 03/27/18 22:33 Dose:  2.5 mg











- Labs


Labs: 


 





 03/27/18 08:21 





 03/28/18 07:20 





 











PT  15.2 SECONDS (9.7-12.2)  H  03/26/18  13:43    


 


INR  1.3   03/26/18  13:43    


 


APTT  29 SECONDS (21-34)   03/26/18  13:43    














- Constitutional


Appears: Non-toxic, No Acute Distress





- Head Exam


Head Exam: ATRAUMATIC, NORMOCEPHALIC





- Eye Exam


Eye Exam: Normal appearance, Nystagmus





- ENT Exam


ENT Exam: Mucous Membranes Moist





- Neck Exam


Neck Exam: Full ROM





- Respiratory Exam


Respiratory Exam: Clear to Ausculation Bilateral, NORMAL BREATHING PATTERN





- Cardiovascular Exam


Cardiovascular Exam: REGULAR RHYTHM





- GI/Abdominal Exam


GI & Abdominal Exam: Distended (less distended after paracentesis, no pain non 

tender), Soft, Tenderness, Normal Bowel Sounds





- Extremities Exam


Extremities Exam: Full ROM, Normal Inspection.  absent: Joint Swelling, Pedal 

Edema





- Neurological Exam


Neurological Exam: Alert, Normal Gait





- Psychiatric Exam


Psychiatric exam: Normal Affect, Normal Mood





- Skin


Skin Exam: Intact, Normal Color





Assessment and Plan


(1) Ascites of liver


Assessment & Plan: 


unremarkable work ip- post paracenetsis- will await for test, most likely from 

CHF 





Status: Acute   





(2) Cirrhosis of liver


Assessment & Plan: 


etiology?, CHF management 


Status: Acute   





(3) CHF (congestive heart failure)


Assessment & Plan: 


meds as per cardio Dietary education


Status: Acute   





(4) Coronary artery disease


Assessment & Plan: 


post stent on meds 


Status: Acute   





(5) Hypertension


Assessment & Plan: 


continue same meds


Status: Chronic

## 2018-03-28 NOTE — CP.PCM.PN
Subjective





- Date & Time of Evaluation


Date of Evaluation: 03/28/18


Time of Evaluation: 09:40





- Subjective


Subjective: 





CC: ascites





Denies abdominal pain or dyspnea.


Going for paracentesis today


Echocardiogram results pending. No evidence of DVT on Doppler study





Objective





- Vital Signs/Intake and Output


Vital Signs (last 24 hours): 


 











Temp Pulse Resp BP Pulse Ox


 


 97.8 F   103 H  20   126/83   97 


 


 03/28/18 08:34  03/28/18 08:34  03/28/18 08:34  03/28/18 08:34  03/28/18 08:34








Intake and Output: 


 











 03/28/18 03/28/18





 06:59 18:59


 


Intake Total 450 240


 


Balance 450 240














- Medications


Medications: 


 Current Medications





Aspirin (Aspirin Chewable)  81 mg PO DAILY [facility]


   Last Admin: 03/27/18 09:34 Dose:  81 mg


Carvedilol (Coreg)  3.125 mg PO BID [facility]


   Last Admin: 03/27/18 18:34 Dose:  3.125 mg


Furosemide (Lasix)  40 mg IVP BID [facility]


   Last Admin: 03/27/18 18:35 Dose:  40 mg


Heparin Sodium (Porcine) (Heparin)  5,000 units SC Q8 [facility]


   Last Admin: 03/28/18 05:57 Dose:  5,000 units


Losartan Potassium (Cozaar)  50 mg PO DAILY [facility]


   Last Admin: 03/27/18 09:34 Dose:  50 mg


Oxymetazoline HCl (Afrin 0.05%)  0 ml NS Q12H [facility]


   Last Admin: 03/27/18 22:23 Dose:  1 spray


Pantoprazole Sodium (Protonix Ec Tab)  40 mg PO DAILY [facility]


   Last Admin: 03/27/18 09:34 Dose:  40 mg


Rosuvastatin Calcium (Crestor)  2.5 mg PO HS [facility]


   Last Admin: 03/27/18 22:33 Dose:  2.5 mg











- Labs


Labs: 


 





 03/27/18 08:21 





 03/28/18 07:20 





 











PT  15.2 SECONDS (9.7-12.2)  H  03/26/18  13:43    


 


INR  1.3   03/26/18  13:43    


 


APTT  29 SECONDS (21-34)   03/26/18  13:43    














- Constitutional


Appears: Well, No Acute Distress





- Head Exam


Head Exam: NORMOCEPHALIC





- Eye Exam


Eye Exam: absent: Scleral icterus





- Neck Exam


Neck Exam: Normal Inspection





- Respiratory Exam


Respiratory Exam: NORMAL BREATHING PATTERN





- Cardiovascular Exam


Cardiovascular Exam: REGULAR RHYTHM





- GI/Abdominal Exam


GI & Abdominal Exam: Soft.  absent: Tenderness, Mass





- Extremities Exam


Extremities Exam: Normal Inspection.  absent: Pedal Edema





Assessment and Plan


(1) Ascites


Assessment & Plan: 


Await ascites fluid analysis


May be discharged after paracentesis. Discsussed with dr Alvares and RN


Refer patient to our office [phone] for outpatient follow up


Status: Acute

## 2018-03-28 NOTE — US
Date of Procedure: 03/28/2018



PROCEDURE: Ultrasound-guided paracentesis, CPT 98262



Medications: 7 cc 1% Lidocaine







HISTORY:

Ascites



TECHNIQUE:





Following informed consent , the patient was placed supine on the 

stretcher and the site was marked. A limited abdominal ultrasound was 

performed that showed a small amount of intra-abdominal fluid. 

Procedural time out was called and the Pt's abdomen was marked and 

prepped and draped in the usual sterile fashion. Ultrasound-guided  

paracentesis performed.  A total of 600 cc of straw colored fluid was 

removed without complication. Fluid specimen was sent for culture, 

sensitivity, cytology and chemistries.



IMPRESSION:





Ultrasound-guided paracentesis.

## 2018-03-28 NOTE — CP.PCM.PN
Subjective





- Date & Time of Evaluation


Date of Evaluation: 03/28/18


Time of Evaluation: 09:20





- Subjective


Subjective: 





PGY2 Cardiology Progress Note for Dr. Suresh





Patient seen and examined at bedside. No acute distress. Pt going for 

paracentesis today. Denies chest pain or SOB. He reports that his R LE has been 

swollen. His L LE is not swollen. He also c/o several days of abdominal 

swelling. 12-point review of systems is otherwise negative without any 

additional acute complaints.








Objective





- Vital Signs/Intake and Output


Vital Signs (last 24 hours): 


 











Temp Pulse Resp BP Pulse Ox


 


 97.8 F   103 H  20   126/83   97 


 


 03/28/18 08:34  03/28/18 08:34  03/28/18 08:34  03/28/18 08:34  03/28/18 08:34








Intake and Output: 


 











 03/28/18 03/28/18





 06:59 18:59


 


Intake Total 450 240


 


Balance 450 240














- Medications


Medications: 


 Current Medications





Aspirin (Aspirin Chewable)  81 mg PO DAILY [facility]


   Last Admin: 03/27/18 09:34 Dose:  81 mg


Carvedilol (Coreg)  3.125 mg PO BID [facility]


   Last Admin: 03/27/18 18:34 Dose:  3.125 mg


Furosemide (Lasix)  40 mg IVP BID [facility]


   Last Admin: 03/27/18 18:35 Dose:  40 mg


Heparin Sodium (Porcine) (Heparin)  5,000 units SC Q8 [facility]


   Last Admin: 03/28/18 05:57 Dose:  5,000 units


Losartan Potassium (Cozaar)  50 mg PO DAILY [facility]


   Last Admin: 03/27/18 09:34 Dose:  50 mg


Oxymetazoline HCl (Afrin 0.05%)  0 ml NS Q12H [facility]


   Last Admin: 03/27/18 22:23 Dose:  1 spray


Pantoprazole Sodium (Protonix Ec Tab)  40 mg PO DAILY [facility]


   Last Admin: 03/27/18 09:34 Dose:  40 mg


Rosuvastatin Calcium (Crestor)  2.5 mg PO HS [facility]


   Last Admin: 03/27/18 22:33 Dose:  2.5 mg











- Labs


Labs: 


 





 03/27/18 08:21 





 03/28/18 07:20 





 











PT  15.2 SECONDS (9.7-12.2)  H  03/26/18  13:43    


 


INR  1.3   03/26/18  13:43    


 


APTT  29 SECONDS (21-34)   03/26/18  13:43    














- Additional Findings


Additional findings: 





- Constitutional


Appears: Non-toxic, No Acute Distress





- Head Exam


Head Exam: ATRAUMATIC, NORMOCEPHALIC





- Eye Exam


Eye Exam: Normal appearance.  absent: Nystagmus





- ENT Exam


ENT Exam: Mucous Membranes Moist





- Neck Exam


Neck exam: Positive for: Full Rom





- Respiratory Exam


Respiratory Exam: Clear to Auscultation Bilateral, NORMAL BREATHING PATTERN





- Cardiovascular Exam


Cardiovascular Exam: RRR, +S1, +S2





- GI/Abdominal Exam


GI & Abdominal Exam: Distended, Hypoactive Bowel Sounds, Soft, Tenderness (

vague )





- Extremities Exam


Extremities exam: Positive for: full ROM, normal inspection.  Negative for: 

tenderness


Note: R LE w/pitting edema from ankle to knee


   L LE no edema (normal)





- Neurological Exam


Neurological exam: Alert, Normal Gait, Oriented x3





- Psychiatric Exam


Psychiatric exam: Normal Affect, Normal Mood





- Skin


Skin Exam: Intact, Normal Color





Assessment and Plan





- Assessment and Plan (Free Text)


Assessment: 





Congestive Heart Failure


3/28: Continue lasix 40mg BID IVP qD (recently increased)


   Echo preliminary read, showed EF 10-15%, decreased from prior echo Jan 2018. 

f/u official read.


   Patient will be considered for possible cardiac transplant


3/27: Lasix 20mg BID IVP


ECHO 1/23/18- EF 19% with diffuse hyokenesis, mild MR, inc left atrial pressure

, pulm HTN (moderate), restrictive diastolic dysfunction; see full report





Hypertension


3/27-3/28: BP WNL, continue to monitor


Continue Coreg 3.125mg PO BID


Continue Losartan 50mg PO qD





Coronary artery disease


3/27-3/28: pt is comfortable, no chest pain / SOB


Continue ASA 81


s/p stents


s/p pacemaker


Pt reports plavix as outpt





Ascites


3/28: Patient going for paracentesis today


   Continue lasix 40mg BID IVP qD (recently increased)


   Likely 2/2 severely decreased EF of 10-15%


3/27: Lasix 20mg BID IVP


f/u LE duplex


CT Abd/pelv- cardiomegally, abd/pelvic ascites, hepatic cirrhosis


New onset:


Consider CIRRHOSIS- as CT shows nodular liver- but LFTs are nl, and INR is ok.  

PLTs are 160k.  Denies etoh and hep profile is neg.


Consider cardiac, renal, malignancy.


Rec: paracentesis, labs- Fe ferritin, echo.








Case Discussed with Dr. Kerwin Antonio, PGY2

## 2018-03-28 NOTE — CARD
--------------- APPROVED REPORT --------------





EKG Measurement

Heart [id number]

VT [id number]

[id number]

[id number]

[id number]



<Conclusion>

Normal sinus rhythm

Possible Left atrial enlargement

Incomplete left bundle branch block

Left ventricular hypertrophy

T wave abnormality, consider inferolateral ischemia

Abnormal ECG

## 2018-03-28 NOTE — CP.PCM.DIS
Provider





- Provider


Date of Admission: 


03/26/18 15:36





Attending physician: 


Jodi Alvares MD





Time Spent in preparation of Discharge (in minutes): 30





Diagnosis





- Discharge Diagnosis


(1) Ascites of liver


Status: Acute   





(2) Cirrhosis of liver


Status: Acute   





(3) CHF (congestive heart failure)


Status: Acute   





(4) Coronary artery disease


Status: Acute   





(5) Hypertension


Status: Chronic   





Hospital Course





- Lab Results


Lab Results: 


 Most Recent Lab Values











WBC  9.6 K/uL (4.8-10.8)   03/27/18  08:21    


 


RBC  4.26 Mil/uL (4.40-5.90)  L  03/27/18  08:21    


 


Hgb  12.5 g/dL (12.0-18.0)   03/27/18  08:21    


 


Hct  38.3 % (35.0-51.0)   03/27/18  08:21    


 


MCV  89.7 fL (80.0-94.0)   03/27/18  08:21    


 


MCH  29.4 pg (27.0-31.0)   03/27/18  08:21    


 


MCHC  32.8 g/dL (33.0-37.0)  L  03/27/18  08:21    


 


RDW  15.9 % (11.5-14.5)  H  03/27/18  08:21    


 


Plt Count  151 K/uL (130-400)   03/27/18  08:21    


 


MPV  10.1 fL (7.2-11.7)   03/27/18  08:21    


 


Neut % (Auto)  63.7 % (50.0-75.0)   03/27/18  08:21    


 


Lymph % (Auto)  22.1 % (20.0-40.0)   03/27/18  08:21    


 


Mono % (Auto)  12.2 % (0.0-10.0)  H  03/27/18  08:21    


 


Eos % (Auto)  1.2 % (0.0-4.0)   03/27/18  08:21    


 


Baso % (Auto)  0.8 % (0.0-2.0)   03/27/18  08:21    


 


Neut # (Auto)  6.1 K/uL (1.8-7.0)   03/27/18  08:21    


 


Lymph # (Auto)  2.1 K/uL (1.0-4.3)   03/27/18  08:21    


 


Mono # (Auto)  1.2 K/uL (0.0-0.8)  H  03/27/18  08:21    


 


Eos # (Auto)  0.1 K/uL (0.0-0.7)   03/27/18  08:21    


 


Baso # (Auto)  0.1 K/uL (0.0-0.2)   03/27/18  08:21    


 


PT  15.2 SECONDS (9.7-12.2)  H  03/26/18  13:43    


 


INR  1.3   03/26/18  13:43    


 


APTT  29 SECONDS (21-34)   03/26/18  13:43    


 


Sodium  142 mmol/L (132-148)   03/28/18  07:20    


 


Potassium  3.7 mmol/L (3.6-5.2)   03/28/18  07:20    


 


Chloride  106 mmol/L ()   03/28/18  07:20    


 


Carbon Dioxide  25 mmol/L (22-30)   03/28/18  07:20    


 


Anion Gap  16  (10-20)   03/28/18  07:20    


 


BUN  29 mg/dL (9-20)  H  03/28/18  07:20    


 


Creatinine  1.3 mg/dL (0.8-1.5)   03/28/18  07:20    


 


Est GFR ( Amer)  > 60   03/28/18  07:20    


 


Est GFR (Non-Af Amer)  57   03/28/18  07:20    


 


Random Glucose  92 mg/dL ()   03/28/18  07:20    


 


Calcium  8.1 mg/dl (8.6-10.4)  L  03/28/18  07:20    


 


Magnesium  1.8 mg/dL (1.6-2.3)   03/26/18  13:43    


 


Iron  29 ug/dL ()  L  03/28/18  07:20    


 


TIBC  392 ug/dL (250-450)   03/28/18  07:20    


 


% Saturation  7  (20-55)  L  03/28/18  07:20    


 


Ferritin  26.2 ng/mL  03/28/18  07:20    


 


Total Bilirubin  1.1 mg/dL (0.2-1.3)   03/26/18  13:43    


 


AST  32 U/L (17-59)   03/26/18  13:43    


 


ALT  32 U/L (21-72)   03/26/18  13:43    


 


Alkaline Phosphatase  111 U/L ()   03/26/18  13:43    


 


Total Creatine Kinase  128 U/L ()   03/26/18  13:43    


 


CK-MB (Mass)  1.56 ng/mL (0.0-3.38)   03/26/18  13:43    


 


Troponin I  0.0220 ng/mL (0.00-0.120)   03/26/18  13:43    


 


NT-Pro-B Natriuret Pep  4460 pg/mL (0-900)  H  03/26/18  13:43    


 


Total Protein  6.6 g/dL (6.3-8.3)   03/26/18  13:43    


 


Albumin  3.6 g/dL (3.5-5.0)   03/26/18  13:43    


 


Globulin  3.1 gm/dL (2.2-3.9)   03/26/18  13:43    


 


Albumin/Globulin Ratio  1.2  (1.0-2.1)   03/26/18  13:43    


 


Urine Color  Yellow  (YELLOW)   03/26/18  15:21    


 


Urine Clarity  Hazy  (Clear)   03/26/18  15:21    


 


Urine pH  5.0  (5.0-8.0)   03/26/18  15:21    


 


Ur Specific Gravity  1.023  (1.003-1.030)   03/26/18  15:21    


 


Urine Protein  2+ mg/dL (NEGATIVE)  H  03/26/18  15:21    


 


Urine Glucose (UA)  Normal mg/dL (Normal)   03/26/18  15:21    


 


Urine Ketones  Negative mg/dL (NEGATIVE)   03/26/18  15:21    


 


Urine Blood  Negative  (NEGATIVE)   03/26/18  15:21    


 


Urine Nitrate  Negative  (NEGATIVE)   03/26/18  15:21    


 


Urine Bilirubin  Negative  (NEGATIVE)   03/26/18  15:21    


 


Urine Urobilinogen  4.0 mg/dL (0.2-1.0)   03/26/18  15:21    


 


Ur Leukocyte Esterase  Neg Irish/uL (Negative)   03/26/18  15:21    


 


Urine WBC (Auto)  2 /hpf (0-5)   03/26/18  15:21    


 


Urine RBC (Auto)  1 /hpf (0-3)   03/26/18  15:21    


 


Ur Squamous Epith Cells  < 1 /hpf (0-5)   03/26/18  15:21    


 


Hyaline Casts  0-2 /lpf (0-2)   03/26/18  15:21    


 


Fluid Source  Peritoneal/ascites   03/28/18  13:03    


 


Hepatitis A IgM Ab  Negative  (NEGATIVE)   03/27/18  08:21    


 


Hep Bs Antigen  Negative  (NEGATIVE)   03/27/18  08:21    


 


Hep B Core IgM Ab  Negative  (NEGATIVE)   03/27/18  08:21    


 


Hepatitis C Antibody  Negative  (NEGATIVE)   03/27/18  08:21    


 


HIV 1&2 Antibody Screen  Negative  (NEGATIVE)   03/27/18  08:21    














- Hospital Course


Hospital Course: 





patient admitted due to few days =of abdominal pain and enalrging abdomen found 

to have ascites, chf management as per cardio, liver tests unremarkable- 

paracentesis done test pending , uneventful stay, patient sent home and follow 

up in the clinic in one week





Discharge Exam





- Head Exam


Head Exam: ATRAUMATIC, NORMOCEPHALIC





- Eye Exam


Eye Exam: Normal appearance





- ENT Exam


ENT Exam: Mucous Membranes Moist





- Neck Exam


Neck exam: Full Rom





- Respiratory Exam


Respiratory Exam: Clear to PA & Lateral, NORMAL BREATHING PATTERN





- Cardiovascular Exam


Cardiovascular Exam: REGULAR RHYTHM





- GI/Abdominal Exam


GI & Abdominal Exam: Normal Bowel Sounds, Soft.  absent: Tenderness





- Extremities Exam


Extremities exam: full ROM, normal inspection





- Neurological Exam


Neurological exam: Alert, Normal Gait, Oriented x3, Reflexes Normal





- Psychiatric Exam


Psychiatric exam: Normal Affect, Normal Mood





- Skin


Skin Exam: Intact, Normal Color





Discharge Plan





- Follow Up Plan


Condition: STABLE


Disposition: HOME/ ROUTINE


Instructions:  Heart Failure, Adult (DC)

## 2018-03-29 VITALS — TEMPERATURE: 97.7 F | SYSTOLIC BLOOD PRESSURE: 102 MMHG | HEART RATE: 63 BPM | DIASTOLIC BLOOD PRESSURE: 64 MMHG

## 2018-03-29 VITALS — OXYGEN SATURATION: 100 %

## 2018-03-29 NOTE — CARD
--------------- APPROVED REPORT --------------





EXAM: Two-dimensional and M-mode echocardiogram with Doppler, color 

Doppler with contrast.



Other Information 

Quality : GoodRhythm : NSR



INDICATION

Dyspnea Diastolic Congestive Heart Failure Non STEMI



RISK FACTORS

Hypertension 

Diabetes



2D DIMENSIONS 

IVSd0.7   (0.7-1.1cm)LVDd7.1   (3.9-5.9cm)

PWd0.9   (0.7-1.1cm)LVDs6.6   (2.5-4.0cm)

FS (%) 7.0   %LVEF (%)15.2   (>50%)



M-Mode DIMENSIONS 

RVDd2.93   (2.1-3.2cm)Left Atrium (MM)4.89   (2.5-4.0cm)

IVSd0.66   (0.7-1.1cm)Aortic Root2.93   (2.2-3.7cm)

LVDd7.53   (4.0-5.6cm)Aortic Cusp Exc.1.99   (1.5-2.0cm)

PWd0.69   (0.7-1.1cm)FS (%) 15   %

LVDs6.42   (2.0-3.8cm)LVEF (%)30   (>50%)



Mitral Valve

MV E Tvrmkznr182.6cm/sMV A Uwnchvma37.1cm/sE/A ratio5.3



TDI

E/Lateral E'0.0E/Medial E'0.0



Tricuspid Valve

TR Peak Nrvjtawh178su/sTR Peak [id number]



<Conclusion>

Left ventricle: thickness: normal; dilated: severe diffuse systolic 

dysfunction overall ejection fraction: 15%: 

diastolic filling pressures: normal; no definitive suggestion of an 

intraluminal thrombus on contrast studies



Mitral valve: annulus: normal: leaflets: normal: excursion: normal; 

no significant trans-mitral gradient: moderate  incompetence: left 

atrium: dilated

Aortic valve: leaflets: normal: excursion: normal; no significant 

trans-aortic gradient: no significant incompetence: aortic root: 

normal

Right sided Structures:ICD lead noted Pulmonary valve: normal; no 

significant incompetence; Tricuspid valve: normal;moderate 

incompetence:

Intra-cardiac hemodynamics: pulmonary systolic pressures: 45 mmHg; 

central venous pressures: dilated

Trace pericardial effusion

## 2018-10-10 ENCOUNTER — HOSPITAL ENCOUNTER (EMERGENCY)
Dept: HOSPITAL 31 - C.ER | Age: 58
Discharge: HOME | End: 2018-10-10
Payer: COMMERCIAL

## 2018-10-10 VITALS
OXYGEN SATURATION: 100 % | DIASTOLIC BLOOD PRESSURE: 91 MMHG | RESPIRATION RATE: 16 BRPM | SYSTOLIC BLOOD PRESSURE: 156 MMHG | HEART RATE: 73 BPM | TEMPERATURE: 97.3 F

## 2018-10-10 DIAGNOSIS — Z95.0: ICD-10-CM

## 2018-10-10 DIAGNOSIS — I50.9: ICD-10-CM

## 2018-10-10 DIAGNOSIS — I25.10: ICD-10-CM

## 2018-10-10 DIAGNOSIS — Z87.891: ICD-10-CM

## 2018-10-10 DIAGNOSIS — I11.0: Primary | ICD-10-CM

## 2018-10-10 LAB
ALBUMIN SERPL-MCNC: 3.6 G/DL (ref 3.5–5)
ALBUMIN/GLOB SERPL: 1.1 {RATIO} (ref 1–2.1)
ALT SERPL-CCNC: 21 U/L (ref 21–72)
AST SERPL-CCNC: 22 U/L (ref 17–59)
BASOPHILS # BLD AUTO: 0.1 K/UL (ref 0–0.2)
BASOPHILS NFR BLD: 0.7 % (ref 0–2)
BILIRUB UR-MCNC: NEGATIVE MG/DL
BNP SERPL-MCNC: 6890 PG/ML (ref 0–900)
BUN SERPL-MCNC: 18 MG/DL (ref 9–20)
CALCIUM SERPL-MCNC: 8.7 MG/DL (ref 8.6–10.4)
EOSINOPHIL # BLD AUTO: 0.1 K/UL (ref 0–0.7)
EOSINOPHIL NFR BLD: 1.3 % (ref 0–4)
ERYTHROCYTE [DISTWIDTH] IN BLOOD BY AUTOMATED COUNT: 18.4 % (ref 11.5–14.5)
GFR NON-AFRICAN AMERICAN: > 60
GLUCOSE UR STRIP-MCNC: NORMAL MG/DL
HGB BLD-MCNC: 11.7 G/DL (ref 12–18)
HYALINE CASTS #/AREA URNS LPF: (no result) /LPF (ref 0–2)
LEUKOCYTE ESTERASE UR-ACNC: (no result) LEU/UL
LYMPHOCYTES # BLD AUTO: 1.8 K/UL (ref 1–4.3)
LYMPHOCYTES NFR BLD AUTO: 16.7 % (ref 20–40)
MCH RBC QN AUTO: 27.8 PG (ref 27–31)
MCHC RBC AUTO-ENTMCNC: 33.2 G/DL (ref 33–37)
MCV RBC AUTO: 83.8 FL (ref 80–94)
MONOCYTES # BLD: 1 K/UL (ref 0–0.8)
MONOCYTES NFR BLD: 9.2 % (ref 0–10)
NEUTROPHILS # BLD: 7.5 K/UL (ref 1.8–7)
NEUTROPHILS NFR BLD AUTO: 72.1 % (ref 50–75)
NRBC BLD AUTO-RTO: 0 % (ref 0–2)
PH UR STRIP: 6 [PH] (ref 5–8)
PLATELET # BLD: 197 K/UL (ref 130–400)
PMV BLD AUTO: 8.8 FL (ref 7.2–11.7)
PROT UR STRIP-MCNC: (no result) MG/DL
RBC # BLD AUTO: 4.19 MIL/UL (ref 4.4–5.9)
RBC # UR STRIP: NEGATIVE /UL
SP GR UR STRIP: 1.01 (ref 1–1.03)
SQUAMOUS EPITHIAL: < 1 /HPF (ref 0–5)
UROBILINOGEN UR-MCNC: 2 MG/DL (ref 0.2–1)
WBC # BLD AUTO: 10.5 K/UL (ref 4.8–10.8)

## 2018-10-10 PROCEDURE — 71046 X-RAY EXAM CHEST 2 VIEWS: CPT

## 2018-10-10 PROCEDURE — 81001 URINALYSIS AUTO W/SCOPE: CPT

## 2018-10-10 PROCEDURE — 85378 FIBRIN DEGRADE SEMIQUANT: CPT

## 2018-10-10 PROCEDURE — 83880 ASSAY OF NATRIURETIC PEPTIDE: CPT

## 2018-10-10 PROCEDURE — 80053 COMPREHEN METABOLIC PANEL: CPT

## 2018-10-10 PROCEDURE — 84484 ASSAY OF TROPONIN QUANT: CPT

## 2018-10-10 PROCEDURE — 93005 ELECTROCARDIOGRAM TRACING: CPT

## 2018-10-10 PROCEDURE — 96374 THER/PROPH/DIAG INJ IV PUSH: CPT

## 2018-10-10 PROCEDURE — 99285 EMERGENCY DEPT VISIT HI MDM: CPT

## 2018-10-10 PROCEDURE — 94640 AIRWAY INHALATION TREATMENT: CPT

## 2018-10-10 PROCEDURE — 85025 COMPLETE CBC W/AUTO DIFF WBC: CPT

## 2018-10-10 NOTE — C.PDOC
History Of Present Illness


58 year old male presents to the ED c/o SOB for the past 4 days. Patient is also

c/o cough with productive sputum. Patient denies fever, chills, nausea, vomit, 

CP, palpitations, weakness, numbness. 


Chief Complaint (Nursing): Shortness Of Breath


History Per: Patient


History/Exam Limitations: no limitations


Onset/Duration Of Symptoms: Days (4)


Current Symptoms Are (Timing): Still Present


Initiating Event: Upper Respiratory Illness


Exacerbating Factor(s): Coughing


Current Respiratory Medications: See Home Med List


Associated Symptoms: Productive Cough


Recent travel outside of the [place]: No


Additional History Per: Patient





Past Medical History


Reviewed: Historical Data, Nursing Documentation, Vital Signs


Vital Signs: 





                                Last Vital Signs











Temp  98.8 F   10/10/18 19:46


 


Pulse  80   10/10/18 19:46


 


Resp  18   10/10/18 19:46


 


BP  150/101 H  10/10/18 19:46


 


Pulse Ox  99   10/10/18 19:46














- Medical History


PMH: CAD, CHF, HTN


   Denies: Chronic Kidney Disease


Surgical History: Pacemaker (DEFIBRILLATOR/PACEMAKER)





- [facility] Procedures











CORONAR ARTERIOGR-2 CATH (07/27/15)


INSERTION OF ONE VASCULAR STENT (07/27/15)


INSRT OF DRUG-ELUTING CORON ARTERY STENTS(S) (07/27/15)


LEFT HEART CARDIAC CATH (07/27/15)


LT HEART ANGIOCARDIOGRAM (07/27/15)


PERCUTANEOUS TRANSLUMINAL CORONARY ANGIOPLASTY [PTCA] (07/27/15)


PROCEDURE ON SINGLE VESSEL (07/27/15)








Family History: States: Unknown Family Hx





- Social History


Hx Tobacco Use: Yes


Hx Alcohol Use: No


Hx Substance Use: No





- Immunization History


Hx Tetanus Toxoid Vaccination: No


Hx Influenza Vaccination: No


Hx Pneumococcal Vaccination: No





Review Of Systems


Constitutional: Negative for: Fever, Chills


Cardiovascular: Negative for: Chest Pain


Respiratory: Positive for: Cough, Shortness of Breath, Sputum


Gastrointestinal: Negative for: Nausea, Vomiting, Abdominal Pain


Skin: Negative for: Rash


Neurological: Negative for: Weakness, Numbness





Physical Exam





- Physical Exam


Appears: Non-toxic, In Acute Distress


Skin: Normal Color, Warm, Dry


Head: Atraumatic, Normacephalic


Eye(s): bilateral: Normal Inspection


Neck: Normal ROM, Supple


Chest: Symmetrical


Cardiovascular: Rhythm Regular


Respiratory: Rales (bases, left > right), Rhonchi (bilaterally ), Wheezing 

(expiratory ), Other (mild dyspnic)


Gastrointestinal/Abdominal: Soft, No Tenderness, No Guarding, No Rebound


Extremity: Normal ROM, No Tenderness, Pedal Edema (2+ bilaterally )


Neurological/Psych: Oriented x3, Normal Speech, Normal Cognition


Gait: Steady





ED Course And Treatment





- Laboratory Results


Result Diagrams: 


                                 10/10/18 20:04





                                 10/10/18 20:26


O2 Sat by Pulse Oximetry: 99 (ON RA)


Pulse Ox Interpretation: Normal





- Radiology


CXR: Interpreted by Me, Viewed By Me


CXR Interpretation: Yes: Cardiomegaly, Other (presence of central congestion/ 

CHF)





Medical Decision Making


Medical Decision Making: 


Plan:


* EKG


* Labs


* CXR


* IV fluids


* Duoneb x2


* Lasix 40 mg IVP


* Blood culture


* UA





Patient was evaluated by Dr. Alvares at bedside, patient refuses admission and 

Dr. Alvares will see the patient at the clinic tomorrow





Disposition


Discussed With .: Jodi Alvares


Doctor Will See Patient In The: Office


Counseled Patient/Family Regarding: Diagnosis





- Disposition


Disposition: HOME/ ROUTINE


Disposition Time: 22:01


Condition: STABLE





- POA


Present On Arrival: None





- Clinical Impression


Clinical Impression: 


 CHF (congestive heart failure)








- Scribe Statement


The provider has reviewed the documentation as recorded by the Scribe


Leonides Bautista





All medical record entries made by the Scribe were at my direction and 

personally dictated by me. I have reviewed the chart and agree that the record 

accurately reflects my personal performance of the history, physical exam, 

medical decision making, and the department course for this patient. I have also

 personally directed, reviewed, and agree with the discharge instructions and 

disposition.

## 2018-10-11 NOTE — RAD
HISTORY:

Shortness of breath 



COMPARISON:

03/26/2018.



TECHNIQUE:

Chest PA and lateral



FINDINGS:



LINES AND TUBES:

None. 



LUNG AND PLEURA:

The lungs are well inflated and clear. No pleural effusion or 

pneumothorax.



HEART AND MEDIASTINUM:

There is moderate cardiomegaly.  There is stable position of 

left-sided permanent pacing device.  The hilar and mediastinal 

contours are within normal limits.



SKELETAL STRUCTURES:

The bony structures are within normal limits for the patient's age.



VISUALIZED UPPER ABDOMEN:

Normal.



OTHER FINDINGS:

None.



IMPRESSION:

No active pulmonary disease.



Moderate cardiomegaly.

## 2018-10-12 NOTE — CARD
--------------- APPROVED REPORT --------------





Date of service: 10/10/2018



EKG Measurement

Heart [id number]

[place] [id number]

[id number]

[id number]

[id number]



<Conclusion>

Sinus rhythm with occasional premature ventricular complexes

Minimal voltage criteria for LVH, may be normal variant

T wave abnormality, consider lateral ischemia

Abnormal ECG

## 2018-10-22 ENCOUNTER — HOSPITAL ENCOUNTER (INPATIENT)
Dept: HOSPITAL 31 - C.ER | Age: 58
LOS: 4 days | Discharge: HOME | DRG: 291 | End: 2018-10-26
Attending: INTERNAL MEDICINE | Admitting: INTERNAL MEDICINE
Payer: COMMERCIAL

## 2018-10-22 VITALS — BODY MASS INDEX: 26.7 KG/M2

## 2018-10-22 DIAGNOSIS — I25.2: ICD-10-CM

## 2018-10-22 DIAGNOSIS — J18.9: ICD-10-CM

## 2018-10-22 DIAGNOSIS — J40: ICD-10-CM

## 2018-10-22 DIAGNOSIS — R18.8: ICD-10-CM

## 2018-10-22 DIAGNOSIS — I50.23: ICD-10-CM

## 2018-10-22 DIAGNOSIS — J90: ICD-10-CM

## 2018-10-22 DIAGNOSIS — I11.0: Primary | ICD-10-CM

## 2018-10-22 DIAGNOSIS — Z95.810: ICD-10-CM

## 2018-10-22 DIAGNOSIS — I25.10: ICD-10-CM

## 2018-10-22 DIAGNOSIS — K74.60: ICD-10-CM

## 2018-10-22 DIAGNOSIS — Z95.5: ICD-10-CM

## 2018-10-22 DIAGNOSIS — I50.84: ICD-10-CM

## 2018-10-22 DIAGNOSIS — Z87.891: ICD-10-CM

## 2018-10-22 LAB
ALBUMIN SERPL-MCNC: 3.6 G/DL (ref 3.5–5)
ALBUMIN/GLOB SERPL: 1.1 {RATIO} (ref 1–2.1)
ALT SERPL-CCNC: 32 U/L (ref 21–72)
APTT BLD: 30 SECONDS (ref 21–34)
AST SERPL-CCNC: 31 U/L (ref 17–59)
BASE EXCESS BLDV CALC-SCNC: -5.1 MMOL/L (ref 0–2)
BASOPHILS # BLD AUTO: 0.1 K/UL (ref 0–0.2)
BASOPHILS NFR BLD: 0.7 % (ref 0–2)
BILIRUB UR-MCNC: NEGATIVE MG/DL
BNP SERPL-MCNC: (no result) PG/ML (ref 0–900)
BUN SERPL-MCNC: 29 MG/DL (ref 9–20)
CALCIUM SERPL-MCNC: 9.2 MG/DL (ref 8.6–10.4)
CK MB SERPL-MCNC: 3.48 NG/ML (ref 0–3.38)
EOSINOPHIL # BLD AUTO: 0.1 K/UL (ref 0–0.7)
EOSINOPHIL NFR BLD: 0.7 % (ref 0–4)
ERYTHROCYTE [DISTWIDTH] IN BLOOD BY AUTOMATED COUNT: 19.4 % (ref 11.5–14.5)
GFR NON-AFRICAN AMERICAN: > 60
GLUCOSE UR STRIP-MCNC: NORMAL MG/DL
HGB BLD-MCNC: 12.4 G/DL (ref 12–18)
HYALINE CASTS #/AREA URNS LPF: (no result) /LPF (ref 0–2)
INR PPP: 1.5
LEUKOCYTE ESTERASE UR-ACNC: (no result) LEU/UL
LYMPHOCYTES # BLD AUTO: 1.3 K/UL (ref 1–4.3)
LYMPHOCYTES NFR BLD AUTO: 10.1 % (ref 20–40)
MCH RBC QN AUTO: 27 PG (ref 27–31)
MCHC RBC AUTO-ENTMCNC: 32 G/DL (ref 33–37)
MCV RBC AUTO: 84.5 FL (ref 80–94)
MONOCYTES # BLD: 1.3 K/UL (ref 0–0.8)
MONOCYTES NFR BLD: 10.5 % (ref 0–10)
NEUTROPHILS # BLD: 9.8 K/UL (ref 1.8–7)
NEUTROPHILS NFR BLD AUTO: 78 % (ref 50–75)
NRBC BLD AUTO-RTO: 0 % (ref 0–2)
PCO2 BLDV: 36 MMHG (ref 40–60)
PH BLDV: 7.35 [PH] (ref 7.32–7.43)
PH UR STRIP: 5 [PH] (ref 5–8)
PLATELET # BLD: 197 K/UL (ref 130–400)
PMV BLD AUTO: 9.3 FL (ref 7.2–11.7)
PROT UR STRIP-MCNC: (no result) MG/DL
PROTHROMBIN TIME: 16.4 SECONDS (ref 9.7–12.2)
RBC # BLD AUTO: 4.6 MIL/UL (ref 4.4–5.9)
RBC # UR STRIP: (no result) /UL
SP GR UR STRIP: 1.01 (ref 1–1.03)
TROPONIN I SERPL-MCNC: 0.1 NG/ML (ref 0–0.12)
UROBILINOGEN UR-MCNC: 4 MG/DL (ref 0.2–1)
VENOUS BLOOD GAS PO2: 25 MM/HG (ref 30–55)
WBC # BLD AUTO: 12.6 K/UL (ref 4.8–10.8)

## 2018-10-22 NOTE — CT
Date of service: 



10/22/2018



PROCEDURE:  CT Chest without contrast



HISTORY:

LARGE RIGHT SIDED EFFUSION, DYSPNEA



COMPARISON:

Chest x-ray performed earlier same day



TECHNIQUE:

Contiguous axial images were obtained through the chest without 

intravenous contrast enhancement. Sagittal and coronal 

reconstructions were performed.







Radiation dose:



Total exam DLP = 518.88 mGy-cm.



This CT exam was performed using one or more of the following dose 

reduction techniques: Automated exposure control, adjustment of the 

mA and/or kV according to patient size, and/or use of iterative 

reconstruction technique.



FINDINGS:



LUNGS:

 Large right pleural effusion.  Moderate amount of atelectatic lung 

of all 3 lobes on the right.  Left lung appears clear.  There is no 

significant left pleural effusion.  The trachea and bilateral 

mainstem bronchi are patent.



MEDIASTINUM:

Thoracic aorta is normal in caliber. There are coronary vessel 

calcifications. Heart is normal in size.    There is a left-sided 

pacemaker/AICD with lead tips overlying the region of the right 

atrium and right ventricle.  



There is atherosclerotic calcification or mural plaque present.



There is no significant axillary or mediastinal lymphadenopathy.  

Evaluation for hilar lymphadenopathy is limited without IV contrast.



PLEURA:

See above



BONES:

Degenerative changes noted of the spine. Bone island noted in the 

left 9th posterior rib. 



UPPER ABDOMEN:

Moderate volume ascites to the extent visualized. There are 

gallstones in the gallbladder. There is a left renal hypodensity, 

likely cyst as demonstrated on prior CT abdomen pelvis of 03/26/2018. 

remainder of the visualized upper abdominal viscera are grossly 

unremarkable. 



OTHER FINDINGS:

 Visualized thyroid gland is unremarkable.



IMPRESSION:

Large right pleural effusion with moderate amount of atelectatic 

right lung.



Moderate volume ascites to the extent visualized.



Cholelithiasis. 



Additional findings as above.

## 2018-10-22 NOTE — C.PDOC
History Of Present Illness


58-year-old male, PMHx includes ascites and CHF, with an EF of 35%, presents to 

the emergency department with complaints shortness of breath since yesterday, 

that started while going up the stairs while carrying groceries. Patient states 

his abdomen is more distended than usual. He had a paracentsis twice before. 

Patient admits to cough, but denies fever, worsening leg edema, chest pain, 

nausea/vomiting, back pain, or any other associated symptoms. No other 

complaints at this time.


Time Seen by Provider: 10/22/18 09:43


Chief Complaint (Nursing): Shortness Of Breath


History Per: Patient


History/Exam Limitations: no limitations


Current Symptoms Are (Timing): Still Present





Past Medical History


Reviewed: Historical Data, Nursing Documentation, Vital Signs


Vital Signs: 





                                Last Vital Signs











Temp  97.5 F L  10/22/18 09:47


 


Pulse  79   10/22/18 11:33


 


Resp  21   10/22/18 11:33


 


BP  151/95 H  10/22/18 11:33


 


Pulse Ox  97   10/22/18 11:33














- Medical History


PMH: CAD, CHF, HTN


Surgical History: Coronary Stent, Pacemaker (DEFIBRILLATOR/PACEMAKER)





- [facility] Procedures











CORONAR ARTERIOGR-2 CATH (07/27/15)


INSERTION OF ONE VASCULAR STENT (07/27/15)


INSRT OF DRUG-ELUTING CORON ARTERY STENTS(S) (07/27/15)


LEFT HEART CARDIAC CATH (07/27/15)


LT HEART ANGIOCARDIOGRAM (07/27/15)


PERCUTANEOUS TRANSLUMINAL CORONARY ANGIOPLASTY [PTCA] (07/27/15)


PROCEDURE ON SINGLE VESSEL (07/27/15)








Family History: States: Unknown Family Hx





- Social History


Hx Tobacco Use: Yes


Hx Alcohol Use: No


Hx Substance Use: No





- Immunization History


Hx Tetanus Toxoid Vaccination: No


Hx Influenza Vaccination: No


Hx Pneumococcal Vaccination: No





Review Of Systems


Constitutional: Negative for: Fever


Cardiovascular: Negative for: Chest Pain


Respiratory: Positive for: Cough, Shortness of Breath, SOB with Excertion.  

Negative for: Sputum, Wheezing


Musculoskeletal: Negative for: Back Pain


Skin: Negative for: Rash


Neurological: Negative for: Weakness, Numbness





Physical Exam





- Physical Exam


Appears: Non-toxic, No Acute Distress, Other (conversational dyspnea)


Skin: Warm, Dry, No Rash


Head: Atraumatic, Normacephalic


Eye(s): bilateral: Normal Inspection, PERRL


Nose: Normal


Oral Mucosa: Moist


Lips: Normal Appearing


Neck: Normal ROM


Chest: Symmetrical


Cardiovascular: Rhythm Regular, No Murmur


Respiratory: No Accessory Muscle Use, Rales (B/L), No Rhonchi, No Wheezing


Gastrointestinal/Abdominal: No Tenderness, Distention, Ascites


Extremity: Normal ROM, Pedal Edema (pitting, +1), No Deformity


Pulses: Left Dorsalis Pedis: Normal, Right Dorsalis Pedis: Normal


Neurological/Psych: Oriented x3, Normal Speech





ED Course And Treatment





- Laboratory Results


Result Diagrams: 


                                 10/22/18 10:16





                                 10/22/18 10:16


Interpretation Of ECG: LAD. T wave inversion in 1, AVL, V6. No acute changes


Rate From EC


O2 Sat by Pulse Oximetry: 97


Pulse Ox Interpretation: Normal (RA)


Progress Note: Bloodwork, EKG and chest x-ray ordered and reviewed. Pt treated 

with duoneb, lasix and nitroglycerin.





Disposition





- Disposition


Forms:  [facility] (English)





- Scribe Statement


The provider has reviewed the documentation as recorded by the Scribe (Arpan Lieberman)


Provider Attestation: 








All medical record entries made by the Scribe were at my direction and 

personally dictated by me. I have reviewed the chart and agree that the record 

accurately reflects my personal performance of the history, physical exam, 

medical decision making, and the department course for this patient. I have also

 personally directed, reviewed, and agree with the discharge instructions and 

disposition.

## 2018-10-22 NOTE — RAD
Date of service: 



10/22/2018



PROCEDURE:  CHEST RADIOGRAPH, 1 VIEW



HISTORY:

SOB



COMPARISON:

10/10/2018



FINDINGS:



LUNGS:

There is low lung volume on.  The left lung is clear.



PLEURA:

Interval development of large right pleural effusion and shift of 

mediastinum to the.  No pneumothorax or left pleural fluid seen.



CARDIOVASCULAR:

Persistent moderate cardiomegaly.  No aortic atherosclerotic 

calcifications present.  There is stable position of left-sided 

permanent pacing device



OSSEOUS STRUCTURES:

No significant abnormalities.



VISUALIZED UPPER ABDOMEN:

Normal.



OTHER FINDINGS:

None. 



IMPRESSION:

Interval development of large right pleural effusion which shift of 

mediastinum to the left.

## 2018-10-22 NOTE — CP.PCM.HP
History of Present Illness





- History of Present Illness


History of Present Illness: 





58 y.o male with PMH


chf


with defibrillator


ascites


effusion





admitted for SOB and found to have a large pleural effusino


recently treted for bronchitis,


shortness of breath worsened


abdomen gots bigger


in ER , CXR showed large effusion 








PMH


as above


history ascites/paracentesis


hospital early this year for CHF and ascites 


had history of cath and stent 





Present on Admission





- Present on Admission


Any Indicators Present on Admission: No


History of DVT/PE: No


History of Uncontrolled Diabetes: No


Urinary Catheter: No


Decubitus Ulcer Present: No





Review of Systems





- Review of Systems


Systems not reviewed;Unavailable: Respiratory Distress (short of breath, 

breathing more than usual)





- Constitutional


Constitutional: Weight Gain.  absent: Anorexia, Chills, Fever, Malaise, Weakness





- EENT


Eyes: absent: Other Visual Disturbances


Ears: absent: Ear Discharge, Ear Pain


Nose/Mouth/Throat: absent: Nasal Congestion, Sinus Pain, Sinus Pressure





- Cardiovascular


Cardiovascular: Dyspnea on Exertion.  absent: Chest Pain, Leg Edema





- Respiratory


Respiratory: Cough, Chest Congestion





- Gastrointestinal


Gastrointestinal: Abdominal Pain (abdomen getting bigger).  absent: Diarrhea, 

Vomiting





- Genitourinary


Genitourinary: absent: Difficulty Urinating, Urinary Frequency





- Musculoskeletal


Musculoskeletal: absent: Deformity, Muscle Cramps





- Integumentary


Integumentary: absent: Rash, Skin Ulcer, Sores, Jaundice





- Neurological


Neurological: absent: Abnormal Hearing, Abnormal Movements, Behavioral Changes, 

Convulsions





- Psychiatric


Psychiatric: absent: Auditory Hallucinations, Behavioral Changes, Confusion, 

Depression





- Endocrine


Endocrine: absent: Polydipsia, Polyphagia, Polyuria





- Hematologic/Lymphatic


Hematologic: absent: Easy Bleeding, Easy Bruising





Past Patient History





- Infectious Disease


Hx of Infectious Diseases: None





- Past Medical History & Family History


Past Medical History?: Yes





- Past Social History


Smoking Status: Former Smoker





- CARDIAC


Hx Congestive Heart Failure: Yes


Hx Hypertension: Yes


Hx Pacemaker: Yes (DEFIBRILLATOR/PACEMAKER)





- PULMONARY


Hx Respiratory Disorders: No





- NEUROLOGICAL


Hx Neurological Disorder: No





- HEENT


Hx HEENT Problems: No





- RENAL


Hx Chronic Kidney Disease: No





- ENDOCRINE/METABOLIC


Hx Endocrine Disorders: No





- HEMATOLOGICAL/ONCOLOGICAL


Hx Blood Disorders: No





- INTEGUMENTARY


Hx Dermatological Problems: No





- MUSCULOSKELETAL/RHEUMATOLOGICAL


Hx Falls: Yes (broken ankle 1999)





- GASTROINTESTINAL


Hx Gastrointestinal Disorders: No


Other/Comment: hx : H. Pylori





- GENITOURINARY/GYNECOLOGICAL


Hx Genitourinary Disorders: No





- PSYCHIATRIC


Hx Substance Use: No





- SURGICAL HISTORY


Hx Coronary Stent: Yes





- ANESTHESIA


Hx Anesthesia: Yes


Hx Anesthesia Reactions: No


Hx Malignant Hyperthermia: No





Meds


Allergies/Adverse Reactions: 


                                    Allergies











Allergy/AdvReac Type Severity Reaction Status Date / Time


 


No Known Allergies Allergy   Verified 10/10/18 18:07














Physical Exam





- Constitutional


Appears: No Acute Distress (slight shortness of breath, but is conversant in 

senences )





- Head Exam


Head Exam: ATRAUMATIC, NORMOCEPHALIC





- Eye Exam


Eye Exam: Normal appearance.  absent: Nystagmus





- ENT Exam


ENT Exam: Mucous Membranes Moist





- Neck Exam


Neck exam: Positive for: Full Rom.  Negative for: Tenderness





- Respiratory Exam


Respiratory Exam: Decreased Breath Sounds, Wheezes, NORMAL BREATHING PATTERN





- Cardiovascular Exam


Cardiovascular Exam: REGULAR RHYTHM





- GI/Abdominal Exam


GI & Abdominal Exam: Distended, Hypoactive Bowel Sounds.  absent: Tenderness





- Extremities Exam


Extremities exam: Positive for: full ROM, pedal pulses present.  Negative for: 

pedal edema





- Back Exam


Back exam: FULL ROM.  absent: rash noted, tenderness





- Neurological Exam


Neurological exam: Alert, Normal Gait, Oriented x3





- Psychiatric Exam


Psychiatric exam: Normal Affect, Normal Mood





- Skin


Skin Exam: Intact, Normal Color





Results





- Vital Signs


Recent Vital Signs: 





                                Last Vital Signs











Temp  98.3 F   10/22/18 14:23


 


Pulse  80   10/22/18 14:23


 


Resp  22   10/22/18 14:23


 


BP  144/85   10/22/18 14:23


 


Pulse Ox  98   10/22/18 14:23














- Labs


Result Diagrams: 


                                 10/22/18 10:16





                                 10/22/18 10:16


Labs: 





                         Laboratory Results - last 24 hr











  10/22/18 10/22/18 10/22/18





  10:16 10:16 10:16


 


WBC  12.6 H  


 


RBC  4.60  


 


Hgb  12.4  


 


Hct  38.9  


 


MCV  84.5  


 


MCH  27.0  


 


MCHC  32.0 L  


 


RDW  19.4 H  


 


Plt Count  197  


 


MPV  9.3  


 


Neut % (Auto)  78.0 H  


 


Lymph % (Auto)  10.1 L  


 


Mono % (Auto)  10.5 H  


 


Eos % (Auto)  0.7  


 


Baso % (Auto)  0.7  


 


Neut # (Auto)  9.8 H  


 


Lymph # (Auto)  1.3  


 


Mono # (Auto)  1.3 H  


 


Eos # (Auto)  0.1  


 


Baso # (Auto)  0.1  


 


PT   16.4 H 


 


INR   1.5 


 


APTT   30 


 


pO2   


 


VBG pH   


 


VBG pCO2   


 


VBG HCO3   


 


VBG Total CO2   


 


VBG O2 Sat (Calc)   


 


VBG Base Excess   


 


VBG Potassium   


 


Glucose   


 


Lactate   


 


Crit Value Called To   


 


Crit Value Called By   


 


Crit Value Read Back   


 


Blood Gas Notified Time   


 


Sodium    146


 


Potassium    4.3


 


Chloride    105


 


Carbon Dioxide    30


 


Anion Gap    15


 


BUN    29 H


 


Creatinine    1.0


 


Est GFR ( Amer)    > 60


 


Est GFR (Non-Af Amer)    > 60


 


Random Glucose    132 H


 


Calcium    9.2


 


Total Bilirubin    2.0 H


 


AST    31


 


ALT    32


 


Alkaline Phosphatase    204 H


 


Total Creatine Kinase    99


 


CK-MB (Mass)    3.48 H


 


Troponin I    0.1000


 


NT-Pro-B Natriuret Pep    52858 H


 


Total Protein    7.1


 


Albumin    3.6


 


Globulin    3.4


 


Albumin/Globulin Ratio    1.1


 


Venous Blood Potassium   


 


Urine Color   


 


Urine Clarity   


 


Urine pH   


 


Ur Specific Gravity   


 


Urine Protein   


 


Urine Glucose (UA)   


 


Urine Ketones   


 


Urine Blood   


 


Urine Nitrate   


 


Urine Bilirubin   


 


Urine Urobilinogen   


 


Ur Leukocyte Esterase   


 


Urine WBC (Auto)   


 


Urine RBC (Auto)   


 


Hyaline Casts   














  10/22/18 10/22/18





  10:41 11:18


 


WBC  


 


RBC  


 


Hgb  


 


Hct  


 


MCV  


 


MCH  


 


MCHC  


 


RDW  


 


Plt Count  


 


MPV  


 


Neut % (Auto)  


 


Lymph % (Auto)  


 


Mono % (Auto)  


 


Eos % (Auto)  


 


Baso % (Auto)  


 


Neut # (Auto)  


 


Lymph # (Auto)  


 


Mono # (Auto)  


 


Eos # (Auto)  


 


Baso # (Auto)  


 


PT  


 


INR  


 


APTT  


 


pO2  25 L 


 


VBG pH  7.35 


 


VBG pCO2  36 L 


 


VBG HCO3  19.4 


 


VBG Total CO2  21.0 L 


 


VBG O2 Sat (Calc)  45.7 


 


VBG Base Excess  -5.1 L 


 


VBG Potassium  2.3 L* 


 


Glucose  88 


 


Lactate  1.0 


 


Crit Value Called To  Dr dickerson 


 


Crit Value Called By  Juan stoner rrt 


 


Crit Value Read Back  Y 


 


Blood Gas Notified Time  1047 


 


Sodium  144.0 


 


Potassium  


 


Chloride  119.0 H 


 


Carbon Dioxide  


 


Anion Gap  


 


BUN  


 


Creatinine  


 


Est GFR ( Amer)  


 


Est GFR (Non-Af Amer)  


 


Random Glucose  


 


Calcium  


 


Total Bilirubin  


 


AST  


 


ALT  


 


Alkaline Phosphatase  


 


Total Creatine Kinase  


 


CK-MB (Mass)  


 


Troponin I  


 


NT-Pro-B Natriuret Pep  


 


Total Protein  


 


Albumin  


 


Globulin  


 


Albumin/Globulin Ratio  


 


Venous Blood Potassium  2.3 L* 


 


Urine Color   Yellow


 


Urine Clarity   Clear


 


Urine pH   5.0


 


Ur Specific Gravity   1.015


 


Urine Protein   2+ H


 


Urine Glucose (UA)   Normal


 


Urine Ketones   Negative


 


Urine Blood   1+ H


 


Urine Nitrate   Negative


 


Urine Bilirubin   Negative


 


Urine Urobilinogen   4.0


 


Ur Leukocyte Esterase   Neg


 


Urine WBC (Auto)   1


 


Urine RBC (Auto)   3


 


Hyaline Casts   0-2














Assessment & Plan





- Assessment and Plan (Free Text)


Assessment: 





patient with CHF


admitted for 


CHF exacerbation


with possible URI


will start antibiotic


diuretic


with effusion


pulmonary consult


with ascites GI consult


for centesis


GI/DVT prophylaxis


patient is aware of condition and plan


further discussion with cardio and pulmonary 





- Date & Time


Date: 10/22/18


Time: 14:56

## 2018-10-22 NOTE — CP.PCM.CON
History of Present Illness





- History of Present Illness


History of Present Illness: 





58-year-old male, PMHx includes ascites and CHF, with an EF of 35%, presents to 

the emergency department with complaints shortness of breath since yesterday, 

that started while going up the stairs while carrying groceries. Patient states 

his abdomen is more distended than usual. He had a paracentsis twice before. 

Patient admits to cough, but denies fever, worsening leg edema, chest pain, 

nausea/vomiting, back pain, or any other associated symptoms. No other 

complaints at this time.





Chief Complaint (Nursing): Shortness Of Breath


History Per: Patient


History/Exam Limitations: no limitations


Current Symptoms Are (Timing): Still Present





 Past Medical History 


Reviewed: Historical Data, Nursing Documentation, Vital Signs


Vital Signs: 





                                Last Vital Signs











Temp  97.5 F L  10/22/18 09:47


 


Pulse  79   10/22/18 11:33


 


Resp  21   10/22/18 11:33


 


BP  151/95 H  10/22/18 11:33


 


Pulse Ox  97   10/22/18 11:33














- Medical History


PMH: CAD, CHF, HTN


Surgical History: Coronary Stent, Pacemaker (DEFIBRILLATOR/PACEMAKER)





- [facility] Procedures











CORONAR ARTERIOGR-2 CATH (07/27/15)


INSERTION OF ONE VASCULAR STENT (07/27/15)


INSRT OF DRUG-ELUTING CORON ARTERY STENTS(S) (07/27/15)


LEFT HEART CARDIAC CATH (07/27/15)


LT HEART ANGIOCARDIOGRAM (07/27/15)


PERCUTANEOUS TRANSLUMINAL CORONARY ANGIOPLASTY [PTCA] (07/27/15)


PROCEDURE ON SINGLE VESSEL (07/27/15)








Family History: States: Unknown Family Hx





- Social History


Hx Tobacco Use: Yes


Hx Alcohol Use: No


Hx Substance Use: No





- Immunization History


Hx Tetanus Toxoid Vaccination: No


Hx Influenza Vaccination: No


Hx Pneumococcal Vaccination: No





 Review Of Systems 


Constitutional: Negative for: Fever


Cardiovascular: Negative for: Chest Pain


Respiratory: Positive for: Cough, Shortness of Breath, SOB with Excertion.  

Negative for: Sputum, Wheezing


Musculoskeletal: Negative for: Back Pain


Skin: Negative for: Rash


Neurological: Negative for: Weakness, Numbness





 Physical Exam 





- Physical Exam


Appears: Non-toxic, No Acute Distress, Other (conversational dyspnea)


Skin: Warm, Dry, No Rash


Head: Atraumatic, Normacephalic


Eye(s): bilateral: Normal Inspection, PERRL


Nose: Normal


Oral Mucosa: Moist


Lips: Normal Appearing


Neck: Normal ROM


Chest: Symmetrical


Cardiovascular: Rhythm Regular, No Murmur


Respiratory: No Accessory Muscle Use, Rales (B/L), No Rhonchi, No Wheezing


Gastrointestinal/Abdominal: No Tenderness, Distention, Ascites


Extremity: Normal ROM, Pedal Edema (pitting, +1), No Deformity


Pulses: Left Dorsalis Pedis: Normal, Right Dorsalis Pedis: Normal


Neurological/Psych: Oriented x3, Normal Speech





Past Patient History





- Infectious Disease


Hx of Infectious Diseases: None





- Past Medical History & Family History


Past Medical History?: Yes





- Past Social History


Smoking Status: Former Smoker





- CARDIAC


Hx Cardiac Disorders: Yes


Hx Congestive Heart Failure: Yes


Hx Heart Attack: Yes (2009)


Hx Hypertension: Yes


Hx Pacemaker: Yes (DEFIBRILLATOR/PACEMAKER)


Hx Peripheral Edema: Yes





- PULMONARY


Hx Respiratory Disorders: Yes


Hx Bronchitis: Yes





- NEUROLOGICAL


Hx Neurological Disorder: No





- HEENT


Hx HEENT Problems: No





- RENAL


Hx Chronic Kidney Disease: No





- ENDOCRINE/METABOLIC


Hx Endocrine Disorders: No





- HEMATOLOGICAL/ONCOLOGICAL


Hx Blood Disorders: No





- INTEGUMENTARY


Hx Dermatological Problems: No





- MUSCULOSKELETAL/RHEUMATOLOGICAL


Hx Falls: Yes





- GASTROINTESTINAL


Hx Gastrointestinal Disorders: Yes


Hx Diarrhea: Yes


Other/Comment: hx : H. Pylori





- GENITOURINARY/GYNECOLOGICAL


Hx Genitourinary Disorders: No





- PSYCHIATRIC


Hx Substance Use: No





- SURGICAL HISTORY


Hx Surgeries: Yes


Hx Coronary Stent: Yes


Other/Comment: rt ankle surgery,paracentesis due to ascites





- ANESTHESIA


Hx Anesthesia: Yes


Hx Anesthesia Reactions: No


Hx Malignant Hyperthermia: No





Meds


Allergies/Adverse Reactions: 


                                    Allergies











Allergy/AdvReac Type Severity Reaction Status Date / Time


 


No Known Allergies Allergy   Verified 10/10/18 18:07














- Medications


Medications: 


                               Current Medications





Furosemide (Lasix)  40 mg IVP DAILY [facility]


Heparin Sodium (Porcine) (Heparin)  5,000 units SC Q8 [facility]


Ceftriaxone Sodium 2 gm/ (Sodium Chloride)  100 mls @ 100 mls/hr IVPB Q24H [facility]; 

Protocol


   Last Admin: 10/22/18 18:57 Dose:  100 mls/hr


Pantoprazole Sodium (Protonix Inj)  40 mg IVP DAILY [facility]











Results





- Vital Signs


Recent Vital Signs: 


                                Last Vital Signs











Temp  98.2 F   10/22/18 15:15


 


Pulse  76   10/22/18 18:46


 


Resp  20   10/22/18 15:15


 


BP  151/88 H  10/22/18 15:15


 


Pulse Ox  97   10/22/18 15:15














- Labs


Result Diagrams: 


                                 10/22/18 10:16





                                 10/22/18 10:16


Labs: 


                         Laboratory Results - last 24 hr











  10/22/18 10/22/18 10/22/18





  10:16 10:16 10:16


 


WBC  12.6 H  


 


RBC  4.60  


 


Hgb  12.4  


 


Hct  38.9  


 


MCV  84.5  


 


MCH  27.0  


 


MCHC  32.0 L  


 


RDW  19.4 H  


 


Plt Count  197  


 


MPV  9.3  


 


Neut % (Auto)  78.0 H  


 


Lymph % (Auto)  10.1 L  


 


Mono % (Auto)  10.5 H  


 


Eos % (Auto)  0.7  


 


Baso % (Auto)  0.7  


 


Neut # (Auto)  9.8 H  


 


Lymph # (Auto)  1.3  


 


Mono # (Auto)  1.3 H  


 


Eos # (Auto)  0.1  


 


Baso # (Auto)  0.1  


 


PT   16.4 H 


 


INR   1.5 


 


APTT   30 


 


pO2   


 


VBG pH   


 


VBG pCO2   


 


VBG HCO3   


 


VBG Total CO2   


 


VBG O2 Sat (Calc)   


 


VBG Base Excess   


 


VBG Potassium   


 


Glucose   


 


Lactate   


 


Crit Value Called To   


 


Crit Value Called By   


 


Crit Value Read Back   


 


Blood Gas Notified Time   


 


Sodium    146


 


Potassium    4.3


 


Chloride    105


 


Carbon Dioxide    30


 


Anion Gap    15


 


BUN    29 H


 


Creatinine    1.0


 


Est GFR ( Amer)    > 60


 


Est GFR (Non-Af Amer)    > 60


 


Random Glucose    132 H


 


Calcium    9.2


 


Total Bilirubin    2.0 H


 


AST    31


 


ALT    32


 


Alkaline Phosphatase    204 H


 


Total Creatine Kinase    99


 


CK-MB (Mass)    3.48 H


 


Troponin I    0.1000


 


NT-Pro-B Natriuret Pep    63896 H


 


Total Protein    7.1


 


Albumin    3.6


 


Globulin    3.4


 


Albumin/Globulin Ratio    1.1


 


Venous Blood Potassium   


 


Urine Color   


 


Urine Clarity   


 


Urine pH   


 


Ur Specific Gravity   


 


Urine Protein   


 


Urine Glucose (UA)   


 


Urine Ketones   


 


Urine Blood   


 


Urine Nitrate   


 


Urine Bilirubin   


 


Urine Urobilinogen   


 


Ur Leukocyte Esterase   


 


Urine WBC (Auto)   


 


Urine RBC (Auto)   


 


Hyaline Casts   














  10/22/18 10/22/18





  10:41 11:18


 


WBC  


 


RBC  


 


Hgb  


 


Hct  


 


MCV  


 


MCH  


 


MCHC  


 


RDW  


 


Plt Count  


 


MPV  


 


Neut % (Auto)  


 


Lymph % (Auto)  


 


Mono % (Auto)  


 


Eos % (Auto)  


 


Baso % (Auto)  


 


Neut # (Auto)  


 


Lymph # (Auto)  


 


Mono # (Auto)  


 


Eos # (Auto)  


 


Baso # (Auto)  


 


PT  


 


INR  


 


APTT  


 


pO2  25 L 


 


VBG pH  7.35 


 


VBG pCO2  36 L 


 


VBG HCO3  19.4 


 


VBG Total CO2  21.0 L 


 


VBG O2 Sat (Calc)  45.7 


 


VBG Base Excess  -5.1 L 


 


VBG Potassium  2.3 L* 


 


Glucose  88 


 


Lactate  1.0 


 


Crit Value Called To  Dr dickerson 


 


Crit Value Called By  Juan stoner rrt 


 


Crit Value Read Back  Y 


 


Blood Gas Notified Time  1047 


 


Sodium  144.0 


 


Potassium  


 


Chloride  119.0 H 


 


Carbon Dioxide  


 


Anion Gap  


 


BUN  


 


Creatinine  


 


Est GFR ( Amer)  


 


Est GFR (Non-Af Amer)  


 


Random Glucose  


 


Calcium  


 


Total Bilirubin  


 


AST  


 


ALT  


 


Alkaline Phosphatase  


 


Total Creatine Kinase  


 


CK-MB (Mass)  


 


Troponin I  


 


NT-Pro-B Natriuret Pep  


 


Total Protein  


 


Albumin  


 


Globulin  


 


Albumin/Globulin Ratio  


 


Venous Blood Potassium  2.3 L* 


 


Urine Color   Yellow


 


Urine Clarity   Clear


 


Urine pH   5.0


 


Ur Specific Gravity   1.015


 


Urine Protein   2+ H


 


Urine Glucose (UA)   Normal


 


Urine Ketones   Negative


 


Urine Blood   1+ H


 


Urine Nitrate   Negative


 


Urine Bilirubin   Negative


 


Urine Urobilinogen   4.0


 


Ur Leukocyte Esterase   Neg


 


Urine WBC (Auto)   1


 


Urine RBC (Auto)   3


 


Hyaline Casts   0-2














Assessment & Plan





- Assessment and Plan (Free Text)


Assessment: 





Acute On Chronic systolic CHF


s/p AICD


Pleural effusion








For thoracentasis tomorrow


IV Lasix

## 2018-10-22 NOTE — CP.PCM.CON
History of Present Illness





- History of Present Illness


History of Present Illness: 





reason for consultation: shortness of breath





58-year-old male with history of congestive heart failure, , ascites who 

presented to emergency room with shortness of breath started yesterday. Also 

complaining of distention of abdomen. Denies fever chills, denies chest pain. 

Complaining of slight cough. CAT scan of the chest consistent large right-sided 

pleural effusion.








Review of Systems





- Review of Systems


All systems: reviewed and no additional remarkable complaints except (shortness 

of breath and abdominal distention)





Past Patient History





- Infectious Disease


Hx of Infectious Diseases: None





- Past Medical History & Family History


Past Medical History?: Yes





- Past Social History


Smoking Status: Former Smoker





- CARDIAC


Hx Cardiac Disorders: Yes


Hx Congestive Heart Failure: Yes


Hx Heart Attack: Yes (2009)


Hx Hypertension: Yes


Hx Pacemaker: Yes (DEFIBRILLATOR/PACEMAKER)


Hx Peripheral Edema: Yes





- PULMONARY


Hx Respiratory Disorders: Yes


Hx Bronchitis: Yes





- NEUROLOGICAL


Hx Neurological Disorder: No





- HEENT


Hx HEENT Problems: No





- RENAL


Hx Chronic Kidney Disease: No





- ENDOCRINE/METABOLIC


Hx Endocrine Disorders: No





- HEMATOLOGICAL/ONCOLOGICAL


Hx Blood Disorders: No





- INTEGUMENTARY


Hx Dermatological Problems: No





- MUSCULOSKELETAL/RHEUMATOLOGICAL


Hx Falls: Yes





- GASTROINTESTINAL


Hx Gastrointestinal Disorders: Yes


Hx Diarrhea: Yes


Other/Comment: hx : H. Pylori





- GENITOURINARY/GYNECOLOGICAL


Hx Genitourinary Disorders: No





- PSYCHIATRIC


Hx Substance Use: No





- SURGICAL HISTORY


Hx Surgeries: Yes


Hx Coronary Stent: Yes


Other/Comment: rt ankle surgery,paracentesis due to ascites





- ANESTHESIA


Hx Anesthesia: Yes


Hx Anesthesia Reactions: No


Hx Malignant Hyperthermia: No





Meds


Allergies/Adverse Reactions: 


                                    Allergies











Allergy/AdvReac Type Severity Reaction Status Date / Time


 


No Known Allergies Allergy   Verified 10/10/18 18:07














- Medications


Medications: 


                               Current Medications





Furosemide (Lasix)  40 mg IVP DAILY [facility]


Heparin Sodium (Porcine) (Heparin)  5,000 units SC Q8 [facility]


Ceftriaxone Sodium (Rocephin 2 Gm Ivpb)  2 gm in 100 mls @ 100 mls/hr IVPB Q24H 

[facility]; Protocol


Pantoprazole Sodium (Protonix Inj)  40 mg IVP DAILY [facility]











Physical Exam





- Head Exam


Head Exam: ATRAUMATIC, NORMOCEPHALIC





- ENT Exam


ENT Exam: Mucous Membranes Moist





- Neck Exam


Neck exam: Positive for: Normal Inspection





- Respiratory Exam


Respiratory Exam: Decreased Breath Sounds





- Cardiovascular Exam


Cardiovascular Exam: REGULAR RHYTHM





- GI/Abdominal Exam


GI & Abdominal Exam: Distended





- Extremities Exam


Extremities exam: Positive for: pedal edema





Results





- Vital Signs


Recent Vital Signs: 


                                Last Vital Signs











Temp  98.2 F   10/22/18 15:15


 


Pulse  80   10/22/18 15:59


 


Resp  20   10/22/18 15:15


 


BP  151/88 H  10/22/18 15:15


 


Pulse Ox  97   10/22/18 15:15














- Labs


Result Diagrams: 


                                 10/22/18 10:16





                                 10/22/18 10:16


Labs: 


                         Laboratory Results - last 24 hr











  10/22/18 10/22/18 10/22/18





  10:16 10:16 10:16


 


WBC  12.6 H  


 


RBC  4.60  


 


Hgb  12.4  


 


Hct  38.9  


 


MCV  84.5  


 


MCH  27.0  


 


MCHC  32.0 L  


 


RDW  19.4 H  


 


Plt Count  197  


 


MPV  9.3  


 


Neut % (Auto)  78.0 H  


 


Lymph % (Auto)  10.1 L  


 


Mono % (Auto)  10.5 H  


 


Eos % (Auto)  0.7  


 


Baso % (Auto)  0.7  


 


Neut # (Auto)  9.8 H  


 


Lymph # (Auto)  1.3  


 


Mono # (Auto)  1.3 H  


 


Eos # (Auto)  0.1  


 


Baso # (Auto)  0.1  


 


PT   16.4 H 


 


INR   1.5 


 


APTT   30 


 


pO2   


 


VBG pH   


 


VBG pCO2   


 


VBG HCO3   


 


VBG Total CO2   


 


VBG O2 Sat (Calc)   


 


VBG Base Excess   


 


VBG Potassium   


 


Glucose   


 


Lactate   


 


Crit Value Called To   


 


Crit Value Called By   


 


Crit Value Read Back   


 


Blood Gas Notified Time   


 


Sodium    146


 


Potassium    4.3


 


Chloride    105


 


Carbon Dioxide    30


 


Anion Gap    15


 


BUN    29 H


 


Creatinine    1.0


 


Est GFR ( Amer)    > 60


 


Est GFR (Non-Af Amer)    > 60


 


Random Glucose    132 H


 


Calcium    9.2


 


Total Bilirubin    2.0 H


 


AST    31


 


ALT    32


 


Alkaline Phosphatase    204 H


 


Total Creatine Kinase    99


 


CK-MB (Mass)    3.48 H


 


Troponin I    0.1000


 


NT-Pro-B Natriuret Pep    20016 H


 


Total Protein    7.1


 


Albumin    3.6


 


Globulin    3.4


 


Albumin/Globulin Ratio    1.1


 


Venous Blood Potassium   


 


Urine Color   


 


Urine Clarity   


 


Urine pH   


 


Ur Specific Gravity   


 


Urine Protein   


 


Urine Glucose (UA)   


 


Urine Ketones   


 


Urine Blood   


 


Urine Nitrate   


 


Urine Bilirubin   


 


Urine Urobilinogen   


 


Ur Leukocyte Esterase   


 


Urine WBC (Auto)   


 


Urine RBC (Auto)   


 


Hyaline Casts   














  10/22/18 10/22/18





  10:41 11:18


 


WBC  


 


RBC  


 


Hgb  


 


Hct  


 


MCV  


 


MCH  


 


MCHC  


 


RDW  


 


Plt Count  


 


MPV  


 


Neut % (Auto)  


 


Lymph % (Auto)  


 


Mono % (Auto)  


 


Eos % (Auto)  


 


Baso % (Auto)  


 


Neut # (Auto)  


 


Lymph # (Auto)  


 


Mono # (Auto)  


 


Eos # (Auto)  


 


Baso # (Auto)  


 


PT  


 


INR  


 


APTT  


 


pO2  25 L 


 


VBG pH  7.35 


 


VBG pCO2  36 L 


 


VBG HCO3  19.4 


 


VBG Total CO2  21.0 L 


 


VBG O2 Sat (Calc)  45.7 


 


VBG Base Excess  -5.1 L 


 


VBG Potassium  2.3 L* 


 


Glucose  88 


 


Lactate  1.0 


 


Crit Value Called To  Dr dickerson 


 


Crit Value Called By  Juan stoner rrt 


 


Crit Value Read Back  Y 


 


Blood Gas Notified Time  1047 


 


Sodium  144.0 


 


Potassium  


 


Chloride  119.0 H 


 


Carbon Dioxide  


 


Anion Gap  


 


BUN  


 


Creatinine  


 


Est GFR ( Amer)  


 


Est GFR (Non-Af Amer)  


 


Random Glucose  


 


Calcium  


 


Total Bilirubin  


 


AST  


 


ALT  


 


Alkaline Phosphatase  


 


Total Creatine Kinase  


 


CK-MB (Mass)  


 


Troponin I  


 


NT-Pro-B Natriuret Pep  


 


Total Protein  


 


Albumin  


 


Globulin  


 


Albumin/Globulin Ratio  


 


Venous Blood Potassium  2.3 L* 


 


Urine Color   Yellow


 


Urine Clarity   Clear


 


Urine pH   5.0


 


Ur Specific Gravity   1.015


 


Urine Protein   2+ H


 


Urine Glucose (UA)   Normal


 


Urine Ketones   Negative


 


Urine Blood   1+ H


 


Urine Nitrate   Negative


 


Urine Bilirubin   Negative


 


Urine Urobilinogen   4.0


 


Ur Leukocyte Esterase   Neg


 


Urine WBC (Auto)   1


 


Urine RBC (Auto)   3


 


Hyaline Casts   0-2














Assessment & Plan


(1) Pleural effusion


Assessment and Plan: 


most likely secondary to ascites


Thoracentesis


BiPAP


Continue present treatment





Status: Acute   





(2) Ascites


Status: Acute

## 2018-10-23 LAB
% IRON SATURATION: 14 (ref 20–55)
APPEARANCE FLD: CLEAR
BODY FLD TYPE: (no result)
BODY FLUID MONO/MACROPHAGE: 1 % (ref 0–0)
CELL CNT PNL FLD: 100 (ref 0–0)
IRON SERPL-MCNC: 49 UG/DL (ref 49–181)
RBC # FLD AUTO: 430 /MM3 (ref 0–0)
TIBC SERPL-MCNC: 356 UG/DL (ref 250–450)
WBC # FLD: 370 /MM3 (ref 0–300)

## 2018-10-23 PROCEDURE — 0W993ZZ DRAINAGE OF RIGHT PLEURAL CAVITY, PERCUTANEOUS APPROACH: ICD-10-PCS | Performed by: INTERNAL MEDICINE

## 2018-10-23 NOTE — RAD
HISTORY:

 s/p thoracentesis 



COMPARISON:

Chest x-ray performed 10/22/18 



TECHNIQUE:

Chest, one view.



FINDINGS:





LUNGS:

Small layering right pleural effusion and associated consolidation.  

Mild to moderate pulmonary venous congestion.  No definite 

pneumothorax. 



CARDIOVASCULAR:

Cardiomegaly.  Left-sided AICD.  No significant atherosclerotic 

calcification present. 



OSSEOUS STRUCTURES:

No acute osseous abnormality identified.



VISUALIZED UPPER ABDOMEN:

Unremarkable.



OTHER FINDINGS:

None.



IMPRESSION:

Persistent but decreased small layering right pleural effusion and 

associated consolidation.  Mild to moderate pulmonary venous 

congestion. 



Cardiomegaly.  Left-sided AICD.

## 2018-10-23 NOTE — CP.PCM.PN
Subjective





- Date & Time of Evaluation


Date of Evaluation: 10/23/18


Time of Evaluation: 06:00





- Subjective


Subjective: 





chart review


patient had thoracentesis 2 liters of fluid taken


micro no growth 


Cardio discussion about medications


vitals -BP stable, afebrile





patient seen


in bed feeding self, breathing much better after the thoracentesis


is happy was told will have ascites removed tomorrow


no cough no fever no otger complaints


he is back on medications 








Objective





- Vital Signs/Intake and Output


Vital Signs (last 24 hours): 


                                        











Temp Pulse Resp BP Pulse Ox


 


 98 F   70   20   131/75   95 


 


 10/23/18 15:00  10/23/18 15:00  10/23/18 15:00  10/23/18 15:00  10/23/18 15:00








Intake and Output: 


                                        











 10/23/18 10/23/18





 06:59 18:59


 


Intake Total 520 


 


Output Total 700 


 


Balance -180 














- Medications


Medications: 


                               Current Medications





Aspirin (Ecotrin)  81 mg PO DAILY [facility]


   Last Admin: 10/23/18 15:24 Dose:  81 mg


Carvedilol (Coreg)  3.125 mg PO BID [facility]


Furosemide (Lasix)  20 mg IVP BID [facility]


Heparin Sodium (Porcine) (Heparin)  5,000 units SC Q8 [facility]


   Last Admin: 10/23/18 15:16 Dose:  Not Given


Ceftriaxone Sodium 2 gm/ (Sodium Chloride)  100 mls @ 100 mls/hr IVPB Q24H [facility]; 

Protocol


   Last Admin: 10/22/18 18:57 Dose:  100 mls/hr


Influenza Virus Vaccine (Fluzone Quad [id number])  60 mcg IM .ONCE ONE


   Stop: 10/24/18 12:01


Losartan Potassium (Cozaar)  25 mg PO DAILY [facility]


   Last Admin: 10/23/18 15:24 Dose:  25 mg


Pantoprazole Sodium (Protonix Inj)  40 mg IVP DAILY [facility]


   Last Admin: 10/23/18 10:50 Dose:  40 mg


Rosuvastatin Calcium (Crestor)  10 mg PO HS [facility]











- Labs


Labs: 


                                        





                                 10/22/18 10:16 





                                 10/22/18 10:16 





                                        











PT  16.4 SECONDS (9.7-12.2)  H  10/22/18  10:16    


 


INR  1.5   10/22/18  10:16    


 


APTT  30 SECONDS (21-34)   10/22/18  10:16    














- Constitutional


Appears: Non-toxic, No Acute Distress





- Head Exam


Head Exam: ATRAUMATIC, NORMOCEPHALIC





- Eye Exam


Eye Exam: Normal appearance





- ENT Exam


ENT Exam: Mucous Membranes Moist





- Neck Exam


Neck Exam: Full ROM.  absent: Tenderness





- Respiratory Exam


Respiratory Exam: Decreased Breath Sounds, Clear to Ausculation Bilateral 

(congestion butno ore wheezing ), NORMAL BREATHING PATTERN





- Cardiovascular Exam


Cardiovascular Exam: REGULAR RHYTHM





- GI/Abdominal Exam


GI & Abdominal Exam: Distended, Soft.  absent: Tenderness





- Extremities Exam


Extremities Exam: Full ROM, Normal Inspection.  absent: Joint Swelling, Pedal 

Edema





- Back Exam


Back Exam: rash noted.  absent: tenderness





- Neurological Exam


Neurological Exam: Alert, Awake, Normal Gait, Oriented x3





- Psychiatric Exam


Psychiatric exam: Normal Affect, Normal Mood





- Skin


Skin Exam: Intact, Normal Color





Assessment and Plan





- Assessment and Plan (Free Text)


Assessment: 





Patienwith CHF with shortness of breath 


admitted for Pleural effusion- post thoracentesis , improved


ascites- scheduled for paracentesis tomorrow


no peripheral edema 


ambulatory


continue current medications 


continue gi/dvt prophylaxis

## 2018-10-23 NOTE — CP.PCM.PN
Subjective





- Date & Time of Evaluation


Date of Evaluation: 10/23/18


Time of Evaluation: 13:50





- Subjective


Subjective: 








Patient seen and examined at bedside.


Patient afebrile and in no acute distress.


Complains of SOB, and mild cough.


Patient underwent thoracentesis and ~1.7L fluid was removed from R lung in the 

AM.





Objective





- Vital Signs/Intake and Output


Vital Signs (last 24 hours): 


                                        











Temp Pulse Resp BP Pulse Ox


 


 98 F   70   20   131/75   95 


 


 10/23/18 15:00  10/23/18 15:00  10/23/18 15:00  10/23/18 15:00  10/23/18 15:00








Intake and Output: 


                                        











 10/23/18 10/23/18





 06:59 18:59


 


Intake Total 520 


 


Output Total 700 


 


Balance -180 














- Medications


Medications: 


                               Current Medications





Aspirin (Ecotrin)  81 mg PO DAILY [facility]


   Last Admin: 10/23/18 15:24 Dose:  81 mg


Carvedilol (Coreg)  3.125 mg PO BID [facility]


Furosemide (Lasix)  20 mg IVP BID [facility]


Heparin Sodium (Porcine) (Heparin)  5,000 units SC Q8 [facility]


   Last Admin: 10/23/18 15:16 Dose:  Not Given


Ceftriaxone Sodium 2 gm/ (Sodium Chloride)  100 mls @ 100 mls/hr IVPB Q24H [facility]; 

Protocol


   Last Admin: 10/22/18 18:57 Dose:  100 mls/hr


Influenza Virus Vaccine (Fluzone Quad [id number])  60 mcg IM .ONCE ONE


   Stop: 10/24/18 12:01


Losartan Potassium (Cozaar)  25 mg PO DAILY [facility]


   Last Admin: 10/23/18 15:24 Dose:  25 mg


Pantoprazole Sodium (Protonix Inj)  40 mg IVP DAILY [facility]


   Last Admin: 10/23/18 10:50 Dose:  40 mg


Rosuvastatin Calcium (Crestor)  10 mg PO HS [facility]











- Labs


Labs: 


                                        





                                 10/22/18 10:16 





                                 10/22/18 10:16 





                                        











PT  16.4 SECONDS (9.7-12.2)  H  10/22/18  10:16    


 


INR  1.5   10/22/18  10:16    


 


APTT  30 SECONDS (21-34)   10/22/18  10:16    














- Head Exam


Head Exam: ATRAUMATIC, NORMOCEPHALIC





- ENT Exam


ENT Exam: Mucous Membranes Moist





- Neck Exam


Neck Exam: Normal Inspection





- Respiratory Exam


Respiratory Exam: Decreased Breath Sounds





- Cardiovascular Exam


Cardiovascular Exam: REGULAR RHYTHM





- GI/Abdominal Exam


GI & Abdominal Exam: Soft, Normal Bowel Sounds





Assessment and Plan


(1) Pleural effusion


Assessment & Plan: 


status post thoracentesis, 


Fluid analysis


Follow-up chest x-ray


Paracentesis


Continue Lasix


Low-sodium diet


Status: Acute   





(2) Ascites


Status: Acute

## 2018-10-23 NOTE — CP.PCM.CON
History of Present Illness





- History of Present Illness


History of Present Illness: 





57 yo male h/o CAD, CHF, ascites 3/18, now admitted with SOB and inc abdom 

girth.  pt did not follow up with us from last admission.  Found CT- ascites and

right pl effusion.  Hep profile- neg.





Review of Systems





- Constitutional


Constitutional: Fatigue





- EENT


Eyes: absent: Photophobia





- Cardiovascular


Cardiovascular: Dyspnea





- Respiratory


Respiratory: Dyspnea.  absent: Hemoptysis





- Gastrointestinal


Gastrointestinal: absent: Change in Bowel Habits, Constipation, Hematemesis, 

Hematochezia, Loose Stools, Melena, Vomiting





- Genitourinary


Genitourinary: absent: Hematuria





- Musculoskeletal


Musculoskeletal: absent: Muscle Weakness





- Integumentary


Integumentary: absent: Jaundice





- Neurological


Neurological: absent: Convulsions, Paresthesias





Past Patient History





- Infectious Disease


Hx of Infectious Diseases: None





- Past Medical History & Family History


Past Medical History?: Yes





- Past Social History


Smoking Status: Former Smoker





- CARDIAC


Hx Cardiac Disorders: Yes


Hx Congestive Heart Failure: Yes


Hx Heart Attack: Yes (2009)


Hx Hypertension: Yes


Hx Pacemaker: Yes (DEFIBRILLATOR/PACEMAKER)


Hx Peripheral Edema: Yes





- PULMONARY


Hx Respiratory Disorders: Yes


Hx Bronchitis: Yes





- NEUROLOGICAL


Hx Neurological Disorder: No





- HEENT


Hx HEENT Problems: No





- RENAL


Hx Chronic Kidney Disease: No





- ENDOCRINE/METABOLIC


Hx Endocrine Disorders: No





- HEMATOLOGICAL/ONCOLOGICAL


Hx Blood Disorders: No





- INTEGUMENTARY


Hx Dermatological Problems: No





- MUSCULOSKELETAL/RHEUMATOLOGICAL


Hx Falls: Yes





- GASTROINTESTINAL


Hx Gastrointestinal Disorders: Yes


Hx Diarrhea: Yes


Other/Comment: hx : H. Pylori





- GENITOURINARY/GYNECOLOGICAL


Hx Genitourinary Disorders: No





- PSYCHIATRIC


Hx Substance Use: No





- SURGICAL HISTORY


Hx Surgeries: Yes


Hx Coronary Stent: Yes


Other/Comment: rt ankle surgery,paracentesis due to ascites





- ANESTHESIA


Hx Anesthesia: Yes


Hx Anesthesia Reactions: No


Hx Malignant Hyperthermia: No





Meds


Allergies/Adverse Reactions: 


                                    Allergies











Allergy/AdvReac Type Severity Reaction Status Date / Time


 


No Known Allergies Allergy   Verified 10/10/18 18:07














- Medications


Medications: 


                               Current Medications





Aspirin (Ecotrin)  81 mg PO DAILY [facility]


   Last Admin: 10/23/18 15:24 Dose:  81 mg


Carvedilol (Coreg)  3.125 mg PO BID [facility]


Furosemide (Lasix)  20 mg IVP BID [facility]


Heparin Sodium (Porcine) (Heparin)  5,000 units SC Q8 [facility]


   Last Admin: 10/23/18 15:16 Dose:  Not Given


Ceftriaxone Sodium 2 gm/ (Sodium Chloride)  100 mls @ 100 mls/hr IVPB Q24H [facility]; 

Protocol


   Last Admin: 10/22/18 18:57 Dose:  100 mls/hr


Influenza Virus Vaccine (Fluzone Quad [id number])  60 mcg IM .ONCE ONE


   Stop: 10/24/18 12:01


Losartan Potassium (Cozaar)  25 mg PO DAILY [facility]


   Last Admin: 10/23/18 15:24 Dose:  25 mg


Pantoprazole Sodium (Protonix Inj)  40 mg IVP DAILY [facility]


   Last Admin: 10/23/18 10:50 Dose:  40 mg


Rosuvastatin Calcium (Crestor)  10 mg PO HS [facility]











Physical Exam





- Constitutional


Appears: Non-toxic





- Respiratory Exam


Respiratory Exam: Rales, Rhonchi





- GI/Abdominal Exam


GI & Abdominal Exam: Distended, Normal Bowel Sounds, Soft.  absent: Guarding, 

Mass, Rebound, Tenderness


Additional comments: 





= distention with ascites and shifting dullness.





- Extremities Exam


Extremities exam: Negative for: calf tenderness





- Neurological Exam


Neurological exam: Alert, Oriented x3





- Skin


Skin Exam: Intact





Results





- Vital Signs


Recent Vital Signs: 


                                Last Vital Signs











Temp  98.1 F   10/23/18 07:00


 


Pulse  79   10/23/18 07:00


 


Resp  20   10/23/18 07:00


 


BP  147/77   10/23/18 10:50


 


Pulse Ox  97   10/23/18 07:00














- Labs


Result Diagrams: 


                                 10/22/18 10:16





                                 10/22/18 10:16


Labs: 


                         Laboratory Results - last 24 hr











  10/23/18





  12:00


 


Fluid Source  Pleural


 


Fluid Appearance  Clear


 


Fluid WBC  370.0 H


 


Fluid RBC  430.0 H


 


Fluid Tot Cell Count  100 H


 


Fluid Neutrophils  29.0 H


 


Fluid Lymphocytes  70.0 H


 


Fld Monocyte/Macrophag  1 H


 


Fluid Comment  














Assessment & Plan


(1) Pleural effusion


Assessment and Plan: 


chanda cardiac/pulm origin.


Status: Acute   





(2) Ascites


Assessment and Plan: 


Pt has cirrhosis- as per CT 3/18- nodular liver.  This still may be cardiac 

origin.  Hep profile is neg.


Lung was tapped.  I dont think tapping abdomen again would add anything, and 

would reaccumulate rapidly.


Treat with diuretics and cardiologically.


Check : AFp, BRANDY, AMA, ASMA, iron studies, weights


Status: Acute   





(3) CHF (congestive heart failure)


Status: Acute   





(4) Cirrhosis of liver


Status: Acute   





(5) Coronary artery disease


Status: Acute   





(6) Pneumonia


Status: Acute   





(7) Hypertension


Status: Chronic

## 2018-10-23 NOTE — CP.PCM.PN
Subjective





- Date & Time of Evaluation


Date of Evaluation: 10/23/18


Time of Evaluation: 20:20





- Subjective


Subjective: 


Patient seen and evaluated


s/p Thoracentacis





Feels better





Physical Examination





- Constitutional


Appears: Non-toxic, No Acute Distress





- Head Exam


Head Exam: ATRAUMATIC, NORMOCEPHALIC





- Eye Exam


Eye Exam: Normal appearance





- ENT Exam


ENT Exam: Mucous Membranes Moist





- Neck Exam


Neck Exam: Full ROM.  absent: Tenderness





- Respiratory Exam


Respiratory Exam: Decreased Breath Sounds, Clear to Ausculation Bilateral 

(congestion butno ore wheezing ), NORMAL BREATHING PATTERN





- Cardiovascular Exam


Cardiovascular Exam: REGULAR RHYTHM





- GI/Abdominal Exam


GI & Abdominal Exam: Distended, Soft.  absent: Tenderness





- Extremities Exam


Extremities Exam: Full ROM, Normal Inspection.  absent: Joint Swelling, Pedal 

Edema





- Back Exam


Back Exam: rash noted.  absent: tenderness





- Neurological Exam


Neurological Exam: Alert, Awake, Normal Gait, Oriented x3





- Psychiatric Exam


Psychiatric exam: Normal Affect, Normal Mood





- Skin


Skin Exam: Intact, Normal Color





Assessment and Plan





- Assessment and Plan (Free Text)


Assessment: 





Pleural Effusion s/p Thoracentasis


Acute on Chronic systolic CHF s/p AICD


CAD s/p MI and stents








Continue ARBs, b blockers, ASA, statins


Will refer to Heart Failure clinic as out patient





Objective





- Vital Signs/Intake and Output


Vital Signs (last 24 hours): 


                                        











Temp Pulse Resp BP Pulse Ox


 


 98 F   70   20   143/89   95 


 


 10/23/18 15:00  10/23/18 15:00  10/23/18 15:00  10/23/18 18:21  10/23/18 15:00











- Medications


Medications: 


                               Current Medications





Aspirin (Ecotrin)  81 mg PO DAILY [facility]


   Last Admin: 10/23/18 15:24 Dose:  81 mg


Carvedilol (Coreg)  3.125 mg PO BID [facility]


   Last Admin: 10/23/18 18:20 Dose:  3.125 mg


Furosemide (Lasix)  20 mg IVP BID [facility]


   Last Admin: 10/23/18 18:21 Dose:  20 mg


Heparin Sodium (Porcine) (Heparin)  5,000 units SC Q8 [facility]


   Last Admin: 10/23/18 21:23 Dose:  5,000 units


Ceftriaxone Sodium 2 gm/ (Sodium Chloride)  100 mls @ 100 mls/hr IVPB Q24H [facility]; 

Protocol


   Last Admin: 10/23/18 18:23 Dose:  100 mls/hr


Influenza Virus Vaccine (Fluzone Quad [id number])  60 mcg IM .ONCE ONE


   Stop: 10/24/18 12:01


Losartan Potassium (Cozaar)  25 mg PO DAILY [facility]


   Last Admin: 10/23/18 15:24 Dose:  25 mg


Pantoprazole Sodium (Protonix Inj)  40 mg IVP DAILY [facility]


   Last Admin: 10/23/18 10:50 Dose:  40 mg


Rosuvastatin Calcium (Crestor)  10 mg PO HS [facility]


   Last Admin: 10/23/18 21:23 Dose:  10 mg











- Labs


Labs: 


                                        





                                 10/22/18 10:16 





                                 10/22/18 10:16 





                                        











PT  16.4 SECONDS (9.7-12.2)  H  10/22/18  10:16    


 


INR  1.5   10/22/18  10:16    


 


APTT  30 SECONDS (21-34)   10/22/18  10:16

## 2018-10-24 LAB
BUN SERPL-MCNC: 26 MG/DL (ref 9–20)
CALCIUM SERPL-MCNC: 8.4 MG/DL (ref 8.6–10.4)
FERRITIN SERPL-MCNC: 83.8 NG/ML
GFR NON-AFRICAN AMERICAN: > 60

## 2018-10-24 NOTE — CP.PCM.PN
Subjective





- Date & Time of Evaluation


Date of Evaluation: 10/24/18


Time of Evaluation: 16:15





- Subjective


Subjective: 





Patient seen and evaluated


Acute on Chronic systolic CHF


Pleural effusion


Feels better





Objective





- Vital Signs/Intake and Output


Vital Signs (last 24 hours): 


                                        











Temp Pulse Resp BP Pulse Ox


 


 98.1 F   69   20   120/78   96 


 


 10/24/18 15:00  10/24/18 16:30  10/24/18 15:00  10/24/18 17:45  10/24/18 15:00








Intake and Output: 


                                        











 10/24/18 10/25/18





 18:59 06:59


 


Intake Total 550 100


 


Balance 550 100














- Medications


Medications: 


                               Current Medications





Aspirin (Ecotrin)  81 mg PO DAILY [facility]


   Last Admin: 10/24/18 09:39 Dose:  81 mg


Carvedilol (Coreg)  3.125 mg PO BID [facility]


   Last Admin: 10/24/18 17:45 Dose:  3.125 mg


Furosemide (Lasix)  20 mg IVP BID [facility]


   Last Admin: 10/24/18 17:45 Dose:  20 mg


Heparin Sodium (Porcine) (Heparin)  5,000 units SC Q8 [facility]


   Last Admin: 10/24/18 21:07 Dose:  5,000 units


Ceftriaxone Sodium 2 gm/ (Sodium Chloride)  100 mls @ 100 mls/hr IVPB Q24H [facility]; 

Protocol


   Last Admin: 10/24/18 19:11 Dose:  100 mls/hr


Losartan Potassium (Cozaar)  25 mg PO DAILY [facility]


   Last Admin: 10/24/18 09:39 Dose:  25 mg


Pantoprazole Sodium (Protonix Inj)  40 mg IVP DAILY [facility]


   Last Admin: 10/24/18 09:36 Dose:  40 mg


Rosuvastatin Calcium (Crestor)  10 mg PO HS [facility]


   Last Admin: 10/24/18 21:07 Dose:  10 mg


Spironolactone (Aldactone)  25 mg PO BID [facility]


   Last Admin: 10/24/18 17:44 Dose:  25 mg











- Labs


Labs: 


                                        





                                 10/22/18 10:16 





                                 10/24/18 06:41 





                                        











PT  16.4 SECONDS (9.7-12.2)  H  10/22/18  10:16    


 


INR  1.5   10/22/18  10:16    


 


APTT  30 SECONDS (21-34)   10/22/18  10:16

## 2018-10-24 NOTE — CP.PCM.PN
Subjective





- Date & Time of Evaluation


Date of Evaluation: 10/24/18


Time of Evaluation: 15:05





- Subjective


Subjective: 





f/u ascites





S/P thoracentesis


+Dyspnea + increased girth


Elevated BNP





Objective





- Vital Signs/Intake and Output


Vital Signs (last 24 hours): 


                                        











Temp Pulse Resp BP Pulse Ox


 


 98 F   70   20   138/85   97 


 


 10/24/18 07:00  10/24/18 13:00  10/24/18 07:00  10/24/18 09:55  10/24/18 07:00








Intake and Output: 


                                        











 10/24/18 10/24/18





 06:59 18:59


 


Intake Total 420 550


 


Output Total 900 


 


Balance -480 550














- Medications


Medications: 


                               Current Medications





Aspirin (Ecotrin)  81 mg PO DAILY [facility]


   Last Admin: 10/24/18 09:39 Dose:  81 mg


Carvedilol (Coreg)  3.125 mg PO BID [facility]


   Last Admin: 10/24/18 09:39 Dose:  3.125 mg


Furosemide (Lasix)  20 mg IVP BID [facility]


   Last Admin: 10/24/18 09:55 Dose:  20 mg


Heparin Sodium (Porcine) (Heparin)  5,000 units SC Q8 [facility]


   Last Admin: 10/24/18 05:15 Dose:  5,000 units


Ceftriaxone Sodium 2 gm/ (Sodium Chloride)  100 mls @ 100 mls/hr IVPB Q24H [facility]; 

Protocol


   Last Admin: 10/23/18 18:23 Dose:  100 mls/hr


Losartan Potassium (Cozaar)  25 mg PO DAILY [facility]


   Last Admin: 10/24/18 09:39 Dose:  25 mg


Pantoprazole Sodium (Protonix Inj)  40 mg IVP DAILY [facility]


   Last Admin: 10/24/18 09:36 Dose:  40 mg


Rosuvastatin Calcium (Crestor)  10 mg PO HS [facility]


   Last Admin: 10/23/18 21:23 Dose:  10 mg











- Labs


Labs: 


                                        





                                 10/22/18 10:16 





                                 10/24/18 06:41 





                                        











PT  16.4 SECONDS (9.7-12.2)  H  10/22/18  10:16    


 


INR  1.5   10/22/18  10:16    


 


APTT  30 SECONDS (21-34)   10/22/18  10:16    














- Constitutional


Appears: Chronically Ill





- Head Exam


Head Exam: NORMOCEPHALIC





- Eye Exam


Eye Exam: absent: Scleral icterus





- Respiratory Exam


Respiratory Exam: Accessory Muscle Use, Decreased Breath Sounds





- Cardiovascular Exam


Cardiovascular Exam: REGULAR RHYTHM





- GI/Abdominal Exam


GI & Abdominal Exam: Distended, Soft.  absent: Tenderness





- Extremities Exam


Extremities Exam: Normal Inspection.  absent: Pedal Edema





- Neurological Exam


Neurological Exam: Alert, Awake, Oriented x3





Assessment and Plan


(1) Ascites


Assessment & Plan: 


Due to cirrhosis. ? Etiology. 


Ascites fluid in March c/w cirrhotic ascites, cause of cirrhosis undetermined


Rec: Aldactone and Lasix, monitor labwork, daily weights, paracentesis recommend

ed


Status: Acute   





(2) CHF (congestive heart failure)


Status: Acute   





(3) Cirrhosis of liver


Assessment & Plan: 


Undetermined etiology. Hepatitis profile negative March 2018. Denies ETOH.


Severe cardiomyopathy and CHF can be a cause of cirrhosis with ascites


Lab workup for cirrhosis ordered


Status: Acute

## 2018-10-24 NOTE — CP.PCM.PN
Subjective





- Date & Time of Evaluation


Date of Evaluation: 10/24/18


Time of Evaluation: 02:30





- Subjective


Subjective: 





chart review


afebrile


vitals BP stable


discusseion with Dr Pinon- to do another tap


Discussion with Gi-Dr Linn- -Cirrhosis, etio? CHF for tap and add aldactone


patient aware of plan 





patient seen


reports breathing changes again, aware of anther tap


no abdominal pain


clarification of paracentesis with Gastro


no GI complaints








Objective





- Vital Signs/Intake and Output


Vital Signs (last 24 hours): 


                                        











Temp Pulse Resp BP Pulse Ox


 


 98.1 F   69   20   120/78   96 


 


 10/24/18 15:00  10/24/18 16:30  10/24/18 15:00  10/24/18 17:45  10/24/18 15:00








Intake and Output: 


                                        











 10/24/18 10/25/18





 18:59 06:59


 


Intake Total 550 


 


Balance 550 














- Medications


Medications: 


                               Current Medications





Aspirin (Ecotrin)  81 mg PO DAILY [facility]


   Last Admin: 10/24/18 09:39 Dose:  81 mg


Carvedilol (Coreg)  3.125 mg PO BID [facility]


   Last Admin: 10/24/18 17:45 Dose:  3.125 mg


Furosemide (Lasix)  20 mg IVP BID [facility]


   Last Admin: 10/24/18 17:45 Dose:  20 mg


Heparin Sodium (Porcine) (Heparin)  5,000 units SC Q8 [facility]


   Last Admin: 10/24/18 21:07 Dose:  5,000 units


Ceftriaxone Sodium 2 gm/ (Sodium Chloride)  100 mls @ 100 mls/hr IVPB Q24H [facility]; 

Protocol


   Last Admin: 10/24/18 19:11 Dose:  100 mls/hr


Losartan Potassium (Cozaar)  25 mg PO DAILY [facility]


   Last Admin: 10/24/18 09:39 Dose:  25 mg


Pantoprazole Sodium (Protonix Inj)  40 mg IVP DAILY [facility]


   Last Admin: 10/24/18 09:36 Dose:  40 mg


Rosuvastatin Calcium (Crestor)  10 mg PO HS [facility]


   Last Admin: 10/24/18 21:07 Dose:  10 mg


Spironolactone (Aldactone)  25 mg PO BID [facility]


   Last Admin: 10/24/18 17:44 Dose:  25 mg











- Labs


Labs: 


                                        





                                 10/22/18 10:16 





                                 10/24/18 06:41 





                                        











PT  16.4 SECONDS (9.7-12.2)  H  10/22/18  10:16    


 


INR  1.5   10/22/18  10:16    


 


APTT  30 SECONDS (21-34)   10/22/18  10:16    














- Constitutional


Appears: Non-toxic, No Acute Distress





- Head Exam


Head Exam: ATRAUMATIC, NORMOCEPHALIC





- Eye Exam


Eye Exam: Normal appearance, Nystagmus





- ENT Exam


ENT Exam: Mucous Membranes Moist





- Neck Exam


Neck Exam: Full ROM (left defibrillator in plac e).  absent: Tenderness





- Respiratory Exam


Respiratory Exam: Decreased Breath Sounds (starts to have some occassional 

wheezing again)





- Cardiovascular Exam


Cardiovascular Exam: REGULAR RHYTHM





- GI/Abdominal Exam


GI & Abdominal Exam: Distended, Soft, Normal Bowel Sounds.  absent: Tenderness





- Extremities Exam


Extremities Exam: Full ROM, Tenderness.  absent: Pedal Edema





- Neurological Exam


Neurological Exam: Alert, Awake, Normal Gait, Oriented x3





- Psychiatric Exam


Psychiatric exam: Normal Affect, Normal Mood





- Skin


Skin Exam: Intact, Normal Color





Assessment and Plan





- Assessment and Plan (Free Text)


Assessment: 





Patient with CHF admitted for SOB-Pleural  Effusion had pleaural centesi and 

ascites- had first Thoracentesi of 2 liters respiratory-improved- after a day 

got short of breath again- and another plan for thoracentesi - discussion with 

Pulmo


for paracentes and add aldactone as [er discussion with Gastro 


continue current medication 


remained no edema peripherally

## 2018-10-24 NOTE — OP
PROCEDURE DATE:  10/23/2018



PROCEDURE:  Thoracentesis.



INDICATION:  Large right pleural effusion.



DESCRIPTION OF PROCEDURE:  After obtaining consent from patient, explaining

patient risks and benefits, thoracentesis procedure was done.  Right 8th

intercostal space posterior axillary line, under local anesthesia and

sterile conditions, 1.7 L of fluid removed.  Patient tolerated the

procedure well.  No complications.





__________________________________________

Jamal Pinon MD





DD:  10/23/2018 17:30:52

DT:  10/23/2018 21:09:25

Job # [id number]

## 2018-10-24 NOTE — CP.PCM.PN
Subjective





- Date & Time of Evaluation


Date of Evaluation: 10/24/18


Time of Evaluation: 13:25





- Subjective


Subjective: 








Patient seen and examined.


Patient complains of mild shortness of breath. States that he his symptoms of 

SOB improved very well yesterday after thoracentesis, and was able to sleep well

through the night but in the morning he started experiencing mild shortness of 

breath again. 


Still continues to complain of mild cough, with little sputum production.


Afebrile, and in no acute distress currently.


Denies any fevers, chills, headaches, abdominal pain, or any other symptoms. 





Pleural effusion analysis 10/23 - Clear. . . Total cell count 100.

Neutrophils 29. Lymphocytes 70. Many macrophages, and mesothelial cells seen on 

smear. Atypical cells of undetermined significance observed. 





Objective





- Vital Signs/Intake and Output


Vital Signs (last 24 hours): 


                                        











Temp Pulse Resp BP Pulse Ox


 


 98.1 F   74   20   114/71   96 


 


 10/24/18 15:00  10/24/18 15:00  10/24/18 15:00  10/24/18 15:00  10/24/18 15:00








Intake and Output: 


                                        











 10/24/18 10/24/18





 06:59 18:59


 


Intake Total 420 550


 


Output Total 900 


 


Balance -480 550














- Medications


Medications: 


                               Current Medications





Aspirin (Ecotrin)  [facility]ILY [facility]


   Last Admin: 10/24/18 09:39 Dose:  81 mg


Carvedilol (Coreg)  3.125 mg PO BID [facility]


   Last Admin: 10/24/18 09:39 Dose:  3.125 mg


Furosemide (Lasix)  20 mg IVP BID [facility]


   Last Admin: 10/24/18 09:55 Dose:  20 mg


Heparin Sodium (Porcine) (Heparin)  5,000 units SC Q8 [facility]


   Last Admin: 10/24/18 14:00 Dose:  Not Given


Ceftriaxone Sodium 2 gm/ (Sodium Chloride)  100 mls @ 100 mls/hr IVPB Q24H [facility]; 

Protocol


   Last Admin: 10/23/18 18:23 Dose:  100 mls/hr


Losartan Potassium (Cozaar)  25 mg PO DAILY [facility]


   Last Admin: 10/24/18 09:39 Dose:  25 mg


Pantoprazole Sodium (Protonix Inj)  40 mg IVP DAILY [facility]


   Last Admin: 10/24/18 09:36 Dose:  40 mg


Rosuvastatin Calcium (Crestor)  10 mg PO HS [facility]


   Last Admin: 10/23/18 21:23 Dose:  10 mg











- Labs


Labs: 


                                        





                                 10/22/18 10:16 





                                 10/24/18 06:41 





                                        











PT  16.4 SECONDS (9.7-12.2)  H  10/22/18  10:16    


 


INR  1.5   10/22/18  10:16    


 


APTT  30 SECONDS (21-34)   10/22/18  10:16    














Assessment and Plan


(1) Pleural effusion


Status: Acute   





(2) Ascites


Status: Acute

## 2018-10-25 VITALS — RESPIRATION RATE: 20 BRPM

## 2018-10-25 LAB
ALBUMIN SERPL-MCNC: 3.6 G/DL (ref 3.5–5)
ALBUMIN/GLOB SERPL: 1.1 {RATIO} (ref 1–2.1)
ALT SERPL-CCNC: 30 U/L (ref 21–72)
APTT BLD: 32 SECONDS (ref 21–34)
AST SERPL-CCNC: 25 U/L (ref 17–59)
BASOPHILS # BLD AUTO: 0.1 K/UL (ref 0–0.2)
BASOPHILS NFR BLD: 0.6 % (ref 0–2)
BUN SERPL-MCNC: 25 MG/DL (ref 9–20)
CALCIUM SERPL-MCNC: 8.7 MG/DL (ref 8.6–10.4)
EOSINOPHIL # BLD AUTO: 0.2 K/UL (ref 0–0.7)
EOSINOPHIL NFR BLD: 1.8 % (ref 0–4)
ERYTHROCYTE [DISTWIDTH] IN BLOOD BY AUTOMATED COUNT: 19.4 % (ref 11.5–14.5)
GFR NON-AFRICAN AMERICAN: > 60
HGB BLD-MCNC: 12.3 G/DL (ref 12–18)
INR PPP: 1.2
LYMPHOCYTES # BLD AUTO: 1.4 K/UL (ref 1–4.3)
LYMPHOCYTES NFR BLD AUTO: 13.7 % (ref 20–40)
MCH RBC QN AUTO: 27.6 PG (ref 27–31)
MCHC RBC AUTO-ENTMCNC: 32.5 G/DL (ref 33–37)
MCV RBC AUTO: 84.8 FL (ref 80–94)
MONOCYTES # BLD: 0.9 K/UL (ref 0–0.8)
MONOCYTES NFR BLD: 9.5 % (ref 0–10)
NEUTROPHILS # BLD: 7.3 K/UL (ref 1.8–7)
NEUTROPHILS NFR BLD AUTO: 74.4 % (ref 50–75)
NRBC BLD AUTO-RTO: 0 % (ref 0–2)
PLATELET # BLD: 174 K/UL (ref 130–400)
PMV BLD AUTO: 9.3 FL (ref 7.2–11.7)
PROTHROMBIN TIME: 13.4 SECONDS (ref 9.7–12.2)
RBC # BLD AUTO: 4.46 MIL/UL (ref 4.4–5.9)
WBC # BLD AUTO: 9.9 K/UL (ref 4.8–10.8)

## 2018-10-25 NOTE — CARD
--------------- APPROVED REPORT --------------





Date of service: 10/22/2018



EKG Measurement

Heart [id number]

ND [id number]

[id number]

[id number]

[id number]



<Conclusion>

Normal sinus rhythm

Nonspecific T wave abnormality

Abnormal ECG

## 2018-10-25 NOTE — CP.PCM.PN
Subjective





- Date & Time of Evaluation


Date of Evaluation: 10/25/18


Time of Evaluation: 17:20





- Subjective


Subjective: 





Patient seen and evaluated


s/p paracentacis


Feels better





End stage heart failure


Maximize medical therapy


Refer to Heart failure center





Objective





- Vital Signs/Intake and Output


Vital Signs (last 24 hours): 


                                        











Temp Pulse Resp BP Pulse Ox


 


 98.2 F   69   20   104/67   98 


 


 10/25/18 15:00  10/25/18 16:00  10/25/18 15:00  10/25/18 18:57  10/25/18 15:00








Intake and Output: 


                                        











 10/25/18 10/26/18





 18:59 06:59


 


Intake Total 20 


 


Balance 20 














- Medications


Medications: 


                               Current Medications





Aspirin (Ecotrin)  81 mg PO DAILY [facility]


   Last Admin: 10/25/18 09:37 Dose:  81 mg


Carvedilol (Coreg)  3.125 mg PO BID [facility]


   Last Admin: 10/25/18 18:57 Dose:  3.125 mg


Furosemide (Lasix)  20 mg IVP BID [facility]


   Last Admin: 10/25/18 18:57 Dose:  20 mg


Losartan Potassium (Cozaar)  25 mg PO DAILY [facility]


   Last Admin: 10/25/18 09:37 Dose:  25 mg


Pantoprazole Sodium (Protonix Inj)  40 mg IVP DAILY [facility]


   Last Admin: 10/25/18 09:38 Dose:  40 mg


Rosuvastatin Calcium (Crestor)  10 mg PO HS [facility]


   Last Admin: 10/25/18 21:23 Dose:  10 mg


Spironolactone (Aldactone)  25 mg PO BID [facility]


   Last Admin: 10/25/18 18:57 Dose:  25 mg











- Labs


Labs: 


                                        





                                 10/25/18 06:50 





                                 10/25/18 06:50 





                                        











PT  13.4 SECONDS (9.7-12.2)  H  10/25/18  06:50    


 


INR  1.2   10/25/18  06:50    


 


APTT  32 SECONDS (21-34)   10/25/18  06:50

## 2018-10-25 NOTE — CP.PCM.PN
Subjective





- Date & Time of Evaluation


Date of Evaluation: 10/25/18


Time of Evaluation: 13:01





- Subjective


Subjective: 





f/u ascites


Denies RB, CP, SZ. LOC, HA, melena, hematuria, hemoptysis





Objective





- Vital Signs/Intake and Output


Vital Signs (last 24 hours): 


                                        











Temp Pulse Resp BP Pulse Ox


 


 98.2 F   79   18   150/90   97 


 


 10/25/18 09:06  10/25/18 09:06  10/25/18 09:06  10/25/18 09:37  10/25/18 09:06








Intake and Output: 


                                        











 10/25/18 10/25/18





 06:59 18:59


 


Intake Total 100 


 


Balance 100 














- Medications


Medications: 


                               Current Medications





Aspirin (Ecotrin)  81 mg PO DAILY [facility]


   Last Admin: 10/25/18 09:37 Dose:  81 mg


Carvedilol (Coreg)  3.125 mg PO BID [facility]


   Last Admin: 10/25/18 09:37 Dose:  3.125 mg


Furosemide (Lasix)  20 mg IVP BID [facility]


   Last Admin: 10/25/18 09:37 Dose:  20 mg


Heparin Sodium (Porcine) (Heparin)  5,000 units SC Q8 [facility]


   Last Admin: 10/25/18 05:42 Dose:  5,000 units


Ceftriaxone Sodium 2 gm/ (Sodium Chloride)  100 mls @ 100 mls/hr IVPB Q24H [facility]; 

Protocol


   Last Admin: 10/24/18 19:11 Dose:  100 mls/hr


Losartan Potassium (Cozaar)  25 mg PO DAILY [facility]


   Last Admin: 10/25/18 09:37 Dose:  25 mg


Pantoprazole Sodium (Protonix Inj)  40 mg IVP DAILY [facility]


   Last Admin: 10/25/18 09:38 Dose:  40 mg


Rosuvastatin Calcium (Crestor)  10 mg PO HS [facility]


   Last Admin: 10/24/18 21:07 Dose:  10 mg


Spironolactone (Aldactone)  25 mg PO BID [facility]


   Last Admin: 10/25/18 09:38 Dose:  25 mg











- Labs


Labs: 


                                        





                                 10/25/18 06:50 





                                 10/25/18 06:50 





                                        











PT  13.4 SECONDS (9.7-12.2)  H  10/25/18  06:50    


 


INR  1.2   10/25/18  06:50    


 


APTT  32 SECONDS (21-34)   10/25/18  06:50    














- Constitutional


Appears: Non-toxic





- Respiratory Exam


Respiratory Exam: Rales





- Cardiovascular Exam


Cardiovascular Exam: RRR





- GI/Abdominal Exam


GI & Abdominal Exam: Distended, Normal Bowel Sounds.  absent: Tenderness, Mass





- Extremities Exam


Extremities Exam: absent: Pedal Edema





- Neurological Exam


Neurological Exam: Alert, Awake, Oriented x3





Assessment and Plan


(1) Pleural effusion


Status: Acute   





(2) Ascites


Assessment & Plan: 


For paracentesis


Status: Acute   





(3) CHF (congestive heart failure)


Status: Acute   





(4) Cirrhosis of liver


Assessment & Plan: 


check labs


Status: Acute   





(5) Coronary artery disease


Status: Acute   





(6) Pneumonia


Status: Acute   





(7) Hypertension


Status: Chronic

## 2018-10-25 NOTE — CP.PCM.PN
Subjective





- Date & Time of Evaluation


Date of Evaluation: 10/25/18


Time of Evaluation: 11:00





- Subjective


Subjective: 





chart review


vitals afebrile


BP on low side


electrolytes good


albumin good


AST ALT WNL


H/H WNL





Patient seen


feeding self


reports re building up more fluid


very much aware of condition and plan


no fevre no pain 


urination and BM  glen


no jaundice 





Objective





- Vital Signs/Intake and Output


Vital Signs (last 24 hours): 


                                        











Temp Pulse Resp BP Pulse Ox


 


 98.2 F   69   20   104/67   98 


 


 10/25/18 15:00  10/25/18 16:00  10/25/18 15:00  10/25/18 18:57  10/25/18 15:00








Intake and Output: 


                                        











 10/25/18 10/26/18





 18:59 06:59


 


Intake Total 20 


 


Balance 20 














- Medications


Medications: 


                               Current Medications





Aspirin (Ecotrin)  81 mg PO DAILY [facility]


   Last Admin: 10/25/18 09:37 Dose:  81 mg


Carvedilol (Coreg)  3.125 mg PO BID [facility]


   Last Admin: 10/25/18 18:57 Dose:  3.125 mg


Furosemide (Lasix)  20 mg IVP BID [facility]


   Last Admin: 10/25/18 18:57 Dose:  20 mg


Losartan Potassium (Cozaar)  25 mg PO DAILY [facility]


   Last Admin: 10/25/18 09:37 Dose:  25 mg


Pantoprazole Sodium (Protonix Inj)  40 mg IVP DAILY [facility]


   Last Admin: 10/25/18 09:38 Dose:  40 mg


Rosuvastatin Calcium (Crestor)  10 mg PO HS [facility]


   Last Admin: 10/25/18 21:23 Dose:  10 mg


Spironolactone (Aldactone)  25 mg PO BID [facility]


   Last Admin: 10/25/18 18:57 Dose:  25 mg











- Labs


Labs: 


                                        





                                 10/25/18 06:50 





                                 10/25/18 06:50 





                                        











PT  13.4 SECONDS (9.7-12.2)  H  10/25/18  06:50    


 


INR  1.2   10/25/18  06:50    


 


APTT  32 SECONDS (21-34)   10/25/18  06:50    














- Constitutional


Appears: Non-toxic, No Acute Distress





- Head Exam


Head Exam: ATRAUMATIC, NORMOCEPHALIC





- Eye Exam


Eye Exam: Normal appearance.  absent: Nystagmus





- ENT Exam


ENT Exam: Mucous Membranes Moist





- Neck Exam


Neck Exam: Full ROM.  absent: Tenderness





- Respiratory Exam


Respiratory Exam: Clear to Ausculation Bilateral, NORMAL BREATHING PATTERN 

(defibrillator left chest)





- Cardiovascular Exam


Cardiovascular Exam: REGULAR RHYTHM





- GI/Abdominal Exam


GI & Abdominal Exam: Distended, Soft, Normal Bowel Sounds.  absent: Tenderness





- Extremities Exam


Extremities Exam: Full ROM, Normal Inspection.  absent: Pedal Edema





- Neurological Exam


Neurological Exam: Alert, Awake, Normal Gait, Oriented x3





- Psychiatric Exam


Psychiatric exam: Normal Affect, Normal Mood





- Skin


Skin Exam: Intact, Normal Color.  absent: Cyanosis, Pallor, Rash





Assessment and Plan





- Assessment and Plan (Free Text)


Assessment: 





patient with severe CHF-with effusion and ascites with no perpheral edema- had 

tap had fluid rebuild up-


another thoracentesis done-3 liters 


paracentesis /GI





 


for the meantime continue diuretic aldactone, fluid and sodium restrictions


further discussion for cardio for further mangement

## 2018-10-25 NOTE — CP.PCM.PN
Subjective





- Date & Time of Evaluation


Date of Evaluation: 10/25/18


Time of Evaluation: 11:35





- Subjective


Subjective: 





Patient seen and examined.


Patient complains of shortness of breath that has worsened since yesterday.


Denies any fevers, chills, cough, headaches, or leg swelling.





Patient underwent thoracentesis in the AM today, and ~2.5L fluid removed. 





Plan: f/u CXR 





Objective





- Vital Signs/Intake and Output


Vital Signs (last 24 hours): 


                                        











Temp Pulse Resp BP Pulse Ox


 


 98.2 F   69   20   135/87   98 


 


 10/25/18 15:00  10/25/18 16:00  10/25/18 15:00  10/25/18 15:00  10/25/18 15:00








Intake and Output: 


                                        











 10/25/18 10/25/18





 06:59 18:59


 


Intake Total 100 20


 


Balance 100 20














- Medications


Medications: 


                               Current Medications





Aspirin (Ecotrin)  81 mg PO DAILY [facility]


   Last Admin: 10/25/18 09:37 Dose:  81 mg


Carvedilol (Coreg)  3.125 mg PO BID [facility]


   Last Admin: 10/25/18 09:37 Dose:  3.125 mg


Furosemide (Lasix)  20 mg IVP BID [facility]


   Last Admin: 10/25/18 09:37 Dose:  20 mg


Heparin Sodium (Porcine) (Heparin)  5,000 units SC Q8 [facility]


   Last Admin: 10/25/18 14:07 Dose:  5,000 units


Ceftriaxone Sodium 2 gm/ (Sodium Chloride)  100 mls @ 100 mls/hr IVPB Q24H [facility]; 

Protocol


   Last Admin: 10/24/18 19:11 Dose:  100 mls/hr


Losartan Potassium (Cozaar)  25 mg PO DAILY [facility]


   Last Admin: 10/25/18 09:37 Dose:  25 mg


Pantoprazole Sodium (Protonix Inj)  40 mg IVP DAILY [facility]


   Last Admin: 10/25/18 09:38 Dose:  40 mg


Rosuvastatin Calcium (Crestor)  10 mg PO HS [facility]


   Last Admin: 10/24/18 21:07 Dose:  10 mg


Spironolactone (Aldactone)  25 mg PO BID [facility]


   Last Admin: 10/25/18 09:38 Dose:  25 mg











- Labs


Labs: 


                                        





                                 10/25/18 06:50 





                                 10/25/18 06:50 





                                        











PT  13.4 SECONDS (9.7-12.2)  H  10/25/18  06:50    


 


INR  1.2   10/25/18  06:50    


 


APTT  32 SECONDS (21-34)   10/25/18  06:50    














Assessment and Plan


(1) Pleural effusion


Status: Acute   





(2) Ascites


Status: Acute

## 2018-10-25 NOTE — RAD
Date of service: 



10/25/2018



HISTORY:

 s/p thoracentesis 



COMPARISON:

10/23/2018 



FINDINGS:



LUNGS:

Interval clearance of prior right pleural effusion.  Interval 

re-expansion of prior right basal atelectasis and/or interval 

clearance of prior concomitant infiltrate here.  Currently no 

infiltrate appreciated.



PLEURA:

No significant pleural effusion identified, no pneumothorax apparent.



CARDIOVASCULAR:

No aortic atherosclerotic calcification present



Cardiomegaly-similar.  Mild pulmonary venous congestion-persist-now 

slightly less pronounced than before. 



Position/ configuration of pacemaker

Satisfactory-similar.



OSSEOUS STRUCTURES:

No significant abnormalities.



VISUALIZED UPPER ABDOMEN:

Normal.



OTHER FINDINGS:

None.



IMPRESSION:

Interval clearance of prior right basal pathology.  



Other findings as above.

## 2018-10-26 VITALS
TEMPERATURE: 97.9 F | OXYGEN SATURATION: 97 % | SYSTOLIC BLOOD PRESSURE: 127 MMHG | DIASTOLIC BLOOD PRESSURE: 71 MMHG | HEART RATE: 74 BPM

## 2018-10-26 LAB
LDH PLR-CCNC: 310 U/L
TOTAL PROTEIN PLEURAL FLUID: 3.1 G/DL

## 2018-10-26 NOTE — CP.PCM.PN
Subjective





- Date & Time of Evaluation


Date of Evaluation: 10/26/18


Time of Evaluation: 15:12





- Subjective


Subjective: 





PT SEEN BY DR. YOUNG AND CLEARED FOR D/C HOME TODAY.  RX SENT TO PT'S PHARMACY 

PER DR. YOUNG'S REQUEST. PT TO F/U WITH DR. YOUNG IN HER OFFICE AND TO F/U 

WITH DR. GARIBAY AS WELL.  SEE BELOW FOR D/C INSTRUCTIONS.  NO FURTHER ORDERS.








-FOLLOW UP WITH DR. YOUNG IN THE OFFICE WITHIN 5-7 DAYS---CALL FOR AN 

APPOINTMENT TIME.


-FOLLOW UP WITH DR. GARIBAY IN THE OFFICE WITHIN 2-3 WEEKS---CALL FOR AN 

APPOINTMENT TIME.


-CONTINUE HOME MEDICATIONS AS USUAL.


-NEW PRESCRIPTIONS SENT TO YOUR PHARMACY INCLUDE:


 1) LOSARTAN 25 MG (FOR YOUR HEART)---TAKE 1 TABLET BY MOUTH ONCE A DAY.


 2) SPIRONOLACTONE 25 MG (WATER PILL; TAKE WITH THE LASIX)---TAKE 1 TABLET BY 

MOUTH TWICE A DAY (MORNING AND EVENING)/


 3) POTASSIUM SUPPLEMENTS---TAKE 1 TABLET BY MOUTH ONCE A DAY; THE LASIX WILL 

LOWER YOUR POTASSIUM, SO THIS WILL HELP MAINTAIN AT 


     NORMAL LEVEL.


-FOR FURTHER CONCERNS OR QUESTIONS, CONTACT DR. YOUNG'S OFFICE. 





Objective





- Vital Signs/Intake and Output


Vital Signs (last 24 hours): 


                                        











Temp Pulse Resp BP Pulse Ox


 


 98.4 F   69   20   115/73   96 


 


 10/26/18 07:00  10/26/18 07:45  10/26/18 07:00  10/26/18 07:00  10/26/18 07:00








Intake and Output: 


                                        











 10/26/18 10/26/18





 06:59 18:59


 


Intake Total 420 


 


Output Total 1050 


 


Balance -630 














- Medications


Medications: 


                               Current Medications





Aspirin (Ecotrin)  81 mg PO DAILY [facility]


   Last Admin: 10/26/18 10:50 Dose:  81 mg


Carvedilol (Coreg)  3.125 mg PO BID [facility]


   Last Admin: 10/26/18 10:50 Dose:  3.125 mg


Furosemide (Lasix)  40 mg PO DAILY [facility]


Losartan Potassium (Cozaar)  25 mg PO DAILY [facility]


   Last Admin: 10/26/18 10:50 Dose:  25 mg


Pantoprazole Sodium (Protonix Inj)  40 mg IVP DAILY [facility]


   Last Admin: 10/26/18 10:50 Dose:  40 mg


Rosuvastatin Calcium (Crestor)  10 mg PO HS [facility]


   Last Admin: 10/25/18 21:23 Dose:  10 mg


Spironolactone (Aldactone)  25 mg PO BID [facility]


   Last Admin: 10/26/18 10:50 Dose:  25 mg











- Labs


Labs: 


                                        





                                 10/25/18 06:50 





                                 10/25/18 06:50 





                                        











PT  13.4 SECONDS (9.7-12.2)  H  10/25/18  06:50    


 


INR  1.2   10/25/18  06:50    


 


APTT  32 SECONDS (21-34)   10/25/18  06:50

## 2018-10-26 NOTE — CP.PCM.PN
Subjective





- Date & Time of Evaluation


Date of Evaluation: 10/26/18


Time of Evaluation: 03:00





- Subjective


Subjective: 


chart review


afebrile


discussion with cardio- plan for referral to [place] for the svere CHF status


discussion with GI-may may not need paracentesis





patient seen


feeling much better after the second thoracentesis,


ambulating


no complaints


abdomen got smaller


he had discussion with GI


further discussion of Cardio plan


he wants to go home and go as ouyt patient 








Objective





- Vital Signs/Intake and Output


Vital Signs (last 24 hours): 


                                        











Temp Pulse Resp BP Pulse Ox


 


 97.9 F   74   20   127/71   97 


 


 10/26/18 15:00  10/26/18 15:00  10/26/18 15:00  10/26/18 15:00  10/26/18 15:00








Intake and Output: 


                                        











 10/26/18 10/26/18





 06:59 18:59


 


Intake Total 420 


 


Output Total 1050 


 


Balance -630 














- Labs


Labs: 


                                        





                                 10/25/18 06:50 





                                 10/25/18 06:50 





                                        











PT  13.4 SECONDS (9.7-12.2)  H  10/25/18  06:50    


 


INR  1.2   10/25/18  06:50    


 


APTT  32 SECONDS (21-34)   10/25/18  06:50    














- Constitutional


Appears: Well, No Acute Distress





- Head Exam


Head Exam: ATRAUMATIC, NORMOCEPHALIC





- Eye Exam


Eye Exam: Normal appearance





- ENT Exam


ENT Exam: Mucous Membranes Moist





- Respiratory Exam


Respiratory Exam: Clear to Ausculation Bilateral, NORMAL BREATHING PATTERN





- Cardiovascular Exam


Cardiovascular Exam: REGULAR RHYTHM





- GI/Abdominal Exam


GI & Abdominal Exam: Distended (but less), Soft, Normal Bowel Sounds.  absent: 

Tenderness





- Extremities Exam


Extremities Exam: Full ROM.  absent: Joint Swelling, Pedal Edema, Tenderness





- Back Exam


Back Exam: Full ROM





- Neurological Exam


Neurological Exam: Alert, Awake, Normal Gait, Oriented x3





- Psychiatric Exam


Psychiatric exam: Normal Affect, Normal Mood





- Skin


Skin Exam: Intact, Normal Color





Assessment and Plan





- Assessment and Plan (Free Text)


Assessment: 





Patient with severe CHF- admitted for SOB found to have large pleural effusion 

undergone thoracentesis x 2 with total 5 liters out,serosanguinous, 


Ascites got better no paracentesis Further cardio plan to refer his severe CHF -

but patient chose to go home and have outpatient follow up instead BP is table 


respiratory is better no other complaints


will send with Lasix potassium,aldactone, ARBS BB, and will see in clinicon 

monday or Tuesday

## 2018-10-26 NOTE — CP.PCM.PN
Subjective





- Date & Time of Evaluation


Date of Evaluation: 10/26/18


Time of Evaluation: 10:25





- Subjective


Subjective: 





F/U ascites





Feels better after thoracentesis. Denies abdominal pain/dyspnea. 


Weight decreasing


Paracentesis not done yet. Wants to go home. Will f/u in our office- keep on 

Lasix and Aldactone





Objective





- Vital Signs/Intake and Output


Vital Signs (last 24 hours): 


                                        











Temp Pulse Resp BP Pulse Ox


 


 98.4 F   69   20   115/73   96 


 


 10/26/18 07:00  10/26/18 07:45  10/26/18 07:00  10/26/18 07:00  10/26/18 07:00








Intake and Output: 


                                        











 10/26/18 10/26/18





 06:59 18:59


 


Intake Total 420 


 


Output Total 1050 


 


Balance -630 














- Medications


Medications: 


                               Current Medications





Aspirin (Ecotrin)  81 mg PO DAILY [facility]


   Last Admin: 10/25/18 09:37 Dose:  81 mg


Carvedilol (Coreg)  3.125 mg PO BID [facility]


   Last Admin: 10/25/18 18:57 Dose:  3.125 mg


Furosemide (Lasix)  20 mg IVP BID [facility]


   Last Admin: 10/25/18 18:57 Dose:  20 mg


Losartan Potassium (Cozaar)  25 mg PO DAILY [facility]


   Last Admin: 10/25/18 09:37 Dose:  25 mg


Pantoprazole Sodium (Protonix Inj)  40 mg IVP DAILY [facility]


   Last Admin: 10/25/18 09:38 Dose:  40 mg


Rosuvastatin Calcium (Crestor)  10 mg PO HS [facility]


   Last Admin: 10/25/18 21:23 Dose:  10 mg


Spironolactone (Aldactone)  25 mg PO BID [facility]


   Last Admin: 10/25/18 18:57 Dose:  25 mg











- Labs


Labs: 


                                        





                                 10/25/18 06:50 





                                 10/25/18 06:50 





                                        











PT  13.4 SECONDS (9.7-12.2)  H  10/25/18  06:50    


 


INR  1.2   10/25/18  06:50    


 


APTT  32 SECONDS (21-34)   10/25/18  06:50    














- Constitutional


Appears: No Acute Distress





- Head Exam


Head Exam: NORMOCEPHALIC





- Eye Exam


Eye Exam: absent: Scleral icterus





- Respiratory Exam


Respiratory Exam: NORMAL BREATHING PATTERN





- Cardiovascular Exam


Cardiovascular Exam: REGULAR RHYTHM





- GI/Abdominal Exam


GI & Abdominal Exam: Soft.  absent: Tenderness (Less distension)





- Extremities Exam


Extremities Exam: absent: Pedal Edema





- Neurological Exam


Neurological Exam: Alert, Oriented x3





- Psychiatric Exam


Psychiatric exam: Normal Mood





Assessment and Plan


(1) Ascites


Assessment & Plan: 


Stable on diuretics


Continue Aldactone and Lasix at current doses


F/U in Office


Paracentesis not done- would not hold up discharge to wait for paracentesis


D/W Dr Vizcarra


Status: Acute   





(2) CHF (congestive heart failure)


Status: Acute   





(3) Cirrhosis of liver


Assessment & Plan: 


Workup for etiology in progress


Hep panel negative


Status: Acute

## 2018-10-26 NOTE — CP.PCM.PN
Subjective





- Date & Time of Evaluation


Date of Evaluation: 10/26/18


Time of Evaluation: 09:45





- Subjective


Subjective: 





Patient seen and examined.


Patient states that his breathing has improved a lot since thoracentesis 

yesterday.


Denies any shortness of breaths, fevers, cough, headaches, or any other 

symptoms.





CXR 10/25: Re-exapnsion of prior right basal atelectasis and/or interval 

clearance of prior concomitant infiltrate noted. No infiltrate appreciated.


Stable from pulmonary standpoint





Objective





- Vital Signs/Intake and Output


Vital Signs (last 24 hours): 


                                        











Temp Pulse Resp BP Pulse Ox


 


 98.4 F   69   20   115/73   96 


 


 10/26/18 07:00  10/26/18 07:45  10/26/18 07:00  10/26/18 07:00  10/26/18 07:00








Intake and Output: 


                                        











 10/26/18 10/26/18





 06:59 18:59


 


Intake Total 420 


 


Output Total 1050 


 


Balance -630 














- Medications


Medications: 


                               Current Medications





Aspirin (Ecotrin)  81 mg PO DAILY [facility]


   Last Admin: 10/26/18 10:50 Dose:  81 mg


Carvedilol (Coreg)  3.125 mg PO BID [facility]


   Last Admin: 10/26/18 10:50 Dose:  3.125 mg


Furosemide (Lasix)  40 mg PO DAILY [facility]


Losartan Potassium (Cozaar)  25 mg PO DAILY [facility]


   Last Admin: 10/26/18 10:50 Dose:  25 mg


Pantoprazole Sodium (Protonix Inj)  40 mg IVP DAILY [facility]


   Last Admin: 10/26/18 10:50 Dose:  40 mg


Rosuvastatin Calcium (Crestor)  10 mg PO HS [facility]


   Last Admin: 10/25/18 21:23 Dose:  10 mg


Spironolactone (Aldactone)  25 mg PO BID [facility]


   Last Admin: 10/26/18 10:50 Dose:  25 mg











- Labs


Labs: 


                                        





                                 10/25/18 06:50 





                                 10/25/18 06:50 





                                        











PT  13.4 SECONDS (9.7-12.2)  H  10/25/18  06:50    


 


INR  1.2   10/25/18  06:50    


 


APTT  32 SECONDS (21-34)   10/25/18  06:50    














Assessment and Plan


(1) Pleural effusion


Status: Acute   





(2) Ascites


Status: Acute

## 2018-10-26 NOTE — CP.PCM.DIS
Provider





- Provider


Date of Admission: 


10/22/18 12:19





Attending physician: 


Jodi Alvares MD





Time Spent in preparation of Discharge (in minutes): 30





Hospital Course





- Lab Results


Lab Results: 


                                  Micro Results





10/22/18 11:25   Blood   Blood Culture - Preliminary


                            NO GROWTH AFTER 4 DAYS


10/22/18 11:25   Blood   Blood Culture - Preliminary


                            NO GROWTH AFTER 4 DAYS


10/23/18 12:00   Pleural Fluid   Gram Stain - Final


10/23/18 12:00   Pleural Fluid   Body Fluid Culture - Preliminary


                            NO GROWTH AFTER 3 DAYS





                             Most Recent Lab Values











WBC  9.9 K/uL (4.8-10.8)   10/25/18  06:50    


 


RBC  4.46 Mil/uL (4.40-5.90)   10/25/18  06:50    


 


Hgb  12.3 g/dL (12.0-18.0)   10/25/18  06:50    


 


Hct  37.9 % (35.0-51.0)   10/25/18  06:50    


 


MCV  84.8 fL (80.0-94.0)   10/25/18  06:50    


 


MCH  27.6 pg (27.0-31.0)   10/25/18  06:50    


 


MCHC  32.5 g/dL (33.0-37.0)  L  10/25/18  06:50    


 


RDW  19.4 % (11.5-14.5)  H  10/25/18  06:50    


 


Plt Count  174 K/uL (130-400)   10/25/18  06:50    


 


MPV  9.3 fL (7.2-11.7)   10/25/18  06:50    


 


Neut % (Auto)  74.4 % (50.0-75.0)   10/25/18  06:50    


 


Lymph % (Auto)  13.7 % (20.0-40.0)  L  10/25/18  06:50    


 


Mono % (Auto)  9.5 % (0.0-10.0)   10/25/18  06:50    


 


Eos % (Auto)  1.8 % (0.0-4.0)   10/25/18  06:50    


 


Baso % (Auto)  0.6 % (0.0-2.0)   10/25/18  06:50    


 


Neut # (Auto)  7.3 K/uL (1.8-7.0)  H  10/25/18  06:50    


 


Lymph # (Auto)  1.4 K/uL (1.0-4.3)   10/25/18  06:50    


 


Mono # (Auto)  0.9 K/uL (0.0-0.8)  H  10/25/18  06:50    


 


Eos # (Auto)  0.2 K/uL (0.0-0.7)   10/25/18  06:50    


 


Baso # (Auto)  0.1 K/uL (0.0-0.2)   10/25/18  06:50    


 


PT  13.4 SECONDS (9.7-12.2)  H  10/25/18  06:50    


 


INR  1.2   10/25/18  06:50    


 


APTT  32 SECONDS (21-34)   10/25/18  06:50    


 


pO2  25 mm/Hg (30-55)  L  10/22/18  10:41    


 


VBG pH  7.35  (7.32-7.43)   10/22/18  10:41    


 


VBG pCO2  36 mmHg (40-60)  L  10/22/18  10:41    


 


VBG HCO3  19.4 mmol/L  10/22/18  10:41    


 


VBG Total CO2  21.0 mmol/L (22-28)  L  10/22/18  10:41    


 


VBG O2 Sat (Calc)  45.7 % (40-65)   10/22/18  10:41    


 


VBG Base Excess  -5.1 mmol/L (0.0-2.0)  L  10/22/18  10:41    


 


VBG Potassium  2.3 mmol/L (3.6-5.2)  L*  10/22/18  10:41    


 


Sodium  144.0 mmol/l (132-148)   10/22/18  10:41    


 


Chloride  119.0 mmol/L ()  H  10/22/18  10:41    


 


Glucose  88 mg/dl ()   10/22/18  10:41    


 


Lactate  1.0 mmol/L (0.7-2.1)   10/22/18  10:41    


 


Crit Value Called To  Dr dickerson   10/22/18  10:41    


 


Crit Value Called By  Juan stoner rrt   10/22/18  10:41    


 


Crit Value Read Back  Y   10/22/18  10:41    


 


Blood Gas Notified Time  1047   10/22/18  10:41    


 


Sodium  143 mmol/L (132-148)   10/25/18  06:50    


 


Potassium  3.9 mmol/L (3.6-5.2)   10/25/18  06:50    


 


Chloride  103 mmol/L ()   10/25/18  06:50    


 


Carbon Dioxide  33 mmol/L (22-30)  H  10/25/18  06:50    


 


Anion Gap  11  (10-20)   10/25/18  06:50    


 


BUN  25 mg/dL (9-20)  H  10/25/18  06:50    


 


Creatinine  0.9 mg/dL (0.8-1.5)   10/25/18  06:50    


 


Est GFR ( Amer)  > 60   10/25/18  06:50    


 


Est GFR (Non-Af Amer)  > 60   10/25/18  06:50    


 


Random Glucose  108 mg/dL ()   10/25/18  06:50    


 


Calcium  8.7 mg/dl (8.6-10.4)   10/25/18  06:50    


 


Phosphorus  3.3 mg/dL (2.5-4.5)   10/25/18  06:50    


 


Magnesium  1.8 mg/dL (1.6-2.3)   10/25/18  06:50    


 


Iron  49 ug/dL ()   10/23/18  19:57    


 


TIBC  356 ug/dL (250-450)   10/23/18  19:57    


 


% Saturation  14  (20-55)  L  10/23/18  19:57    


 


Ferritin  83.8 ng/mL  10/24/18  06:41    


 


Total Bilirubin  1.3 mg/dL (0.2-1.3)   10/25/18  06:50    


 


AST  25 U/L (17-59)   10/25/18  06:50    


 


ALT  30 U/L (21-72)   10/25/18  06:50    


 


Alkaline Phosphatase  225 U/L ()  H  10/25/18  06:50    


 


Total Creatine Kinase  99 U/L ()   10/22/18  10:16    


 


CK-MB (Mass)  3.48 ng/mL (0.0-3.38)  H  10/22/18  10:16    


 


Troponin I  0.1000 ng/mL (0.00-0.120)   10/22/18  10:16    


 


NT-Pro-B Natriuret Pep  76977 pg/mL (0-900)  H  10/22/18  10:16    


 


Total Protein  7.0 g/dL (6.3-8.3)   10/25/18  06:50    


 


Albumin  3.6 g/dL (3.5-5.0)   10/25/18  06:50    


 


Globulin  3.4 gm/dL (2.2-3.9)   10/25/18  06:50    


 


Albumin/Globulin Ratio  1.1  (1.0-2.1)   10/25/18  06:50    


 


Alpha Fetoprotein  1.9 ng/mL (0.0-7.5)   10/25/18  06:50    


 


Venous Blood Potassium  2.3 mmol/L (3.6-5.2)  L*  10/22/18  10:41    


 


Urine Color  Yellow  (YELLOW)   10/22/18  11:18    


 


Urine Clarity  Clear  (Clear)   10/22/18  11:18    


 


Urine pH  5.0  (5.0-8.0)   10/22/18  11:18    


 


Ur Specific Gravity  1.015  (1.003-1.030)   10/22/18  11:18    


 


Urine Protein  2+ mg/dL (NEGATIVE)  H  10/22/18  11:18    


 


Urine Glucose (UA)  Normal mg/dL (Normal)   10/22/18  11:18    


 


Urine Ketones  Negative mg/dL (NEGATIVE)   10/22/18  11:18    


 


Urine Blood  1+  (NEGATIVE)  H  10/22/18  11:18    


 


Urine Nitrate  Negative  (NEGATIVE)   10/22/18  11:18    


 


Urine Bilirubin  Negative  (NEGATIVE)   10/22/18  11:18    


 


Urine Urobilinogen  4.0 mg/dL (0.2-1.0)   10/22/18  11:18    


 


Ur Leukocyte Esterase  Neg Irish/uL (Negative)   10/22/18  11:18    


 


Urine WBC (Auto)  1 /hpf (0-5)   10/22/18  11:18    


 


Urine RBC (Auto)  3 /hpf (0-3)   10/22/18  11:18    


 


Hyaline Casts  0-2 /lpf (0-2)   10/22/18  11:18    


 


Fluid Source  Pleural   10/23/18  12:00    


 


Fluid Appearance  Clear  (CLEAR)   10/23/18  12:00    


 


Fluid WBC  370.0 /mm3 (0.0-300.0)  H  10/23/18  12:00    


 


Fluid RBC  430.0 /mm3 (0.0-0.0)  H  10/23/18  12:00    


 


Fluid Tot Cell Count  100  (0-0)  H  10/23/18  12:00    


 


Fluid Neutrophils  29.0 % (0-0)  H  10/23/18  12:00    


 


Fluid Lymphocytes  70.0 % (0-0)  H  10/23/18  12:00    


 


Fld Monocyte/Macrophag  1 % (0-0)  H  10/23/18  12:00    


 


Fluid Comment     10/23/18  12:00    


 


Pleural Total Protein  3.1 g/dL  10/23/18  12:00    


 


Pleural LDH  310 U/L  10/23/18  12:00    


 


BRANDY Screen  Negative  (Negative)   10/25/18  06:50    


 


Anti-Mitochondrial Ab  Negative  (Negative)   10/24/18  06:41    


 


Anti-Smooth Muscle Ab  Negative  (Negative)   10/24/18  06:41    














- Hospital Course


Hospital Course: 





admitted for pleural effusion-had thoracentesis 2 x total 5 liter fluid 

serosanguinous, no paracentesis , but belly improved somewhat. patient chose to 

have outpatient work up versus in hospital to hospital referral for his severe 

CHF , but patient is to see me in few days . he will go home with lasix and 

potassium aldactone arbs BB, patient very much aware of condition and plan 





Discharge Exam





- Head Exam


Head Exam: ATRAUMATIC, NORMOCEPHALIC





- Eye Exam


Eye Exam: Normal appearance





- ENT Exam


ENT Exam: Mucous Membranes Moist





- Respiratory Exam


Respiratory Exam: Clear to PA & Lateral, NORMAL BREATHING PATTERN





- GI/Abdominal Exam


GI & Abdominal Exam: Distended (but softer), Normal Bowel Sounds, Soft





- Extremities Exam


Extremities exam: normal inspection





- Neurological Exam


Neurological exam: Alert, Normal Gait, Oriented x3





- Psychiatric Exam


Psychiatric exam: Normal Affect, Normal Mood





- Skin


Skin Exam: Intact, Normal Color





Discharge Plan





- Discharge Medications


Prescriptions: 


Spironolactone [Aldactone] 25 mg PO BID #60 tab


Losartan [Cozaar] 25 mg PO DAILY #30 tab


Furosemide [Lasix] 40 mg PO DAILY #30 tablet


Potassium Chloride 10 meq PO DAILY #30 tab.er.[email or web address]





- Follow Up Plan


Condition: GOOD


Disposition: HOME/ ROUTINE


Instructions:  Heart Healthy Diet, Heart Failure, Adult (DC), Pleural Effusion 

(DC), Furosemide, Losartan, Potassium Chloride, Spironolactone, Thoracentesis 

(DC)


Additional Instructions: 


-FOLLOW UP WITH DR. ALVARES IN THE OFFICE WITHIN 5-7 DAYS---CALL FOR AN 

APPOINTMENT TIME.


-FOLLOW UP WITH DR. SURESH IN THE OFFICE WITHIN 2-3 WEEKS---CALL FOR AN 

APPOINTMENT TIME.


-CONTINUE HOME MEDICATIONS AS USUAL.


-NEW PRESCRIPTIONS SENT TO YOUR PHARMACY INCLUDE:


 1) LOSARTAN 25 MG (FOR YOUR HEART)---TAKE 1 TABLET BY MOUTH ONCE A DAY.


 2) SPIRONOLACTONE 25 MG (WATER PILL; TAKE WITH THE LASIX)---TAKE 1 TABLET BY 

MOUTH TWICE A DAY (MORNING AND EVENING)/


 3) POTASSIUM SUPPLEMENTS---TAKE 1 TABLET BY MOUTH ONCE A DAY; THE LASIX WILL 

LOWER YOUR POTASSIUM, SO THIS WILL HELP MAINTAIN AT 


     NORMAL LEVEL.


-FOR FURTHER CONCERNS OR QUESTIONS, CONTACT DR. ALVARES'S OFFICE. 


Referrals: 


Jamal Pinon MD [Staff Provider] - 


Rashaad Suresh MD [Staff Provider] - 


Jodi Alvares MD [Medical Doctor] - 


Hank Warren MD [Staff Provider] -

## 2018-10-29 NOTE — IP.[facility]
Heart Failure Core Measure





- Heart Failure


Ejection Fraction: Less Than 40 %


ACE Inhibitor Prescribed: No


Contraindication/Reason for not providing: on arb


Beta-Blocker Prescribed: Carvedilol


Angiotensin II Receptor Blocker Prescribed: Yes


AnticoagulationTherapy for Atrial Fibrillation/Atrialflutter: No


Contraindication/Reason for not providing: no afib


Aldosterone Antagonist Prescribed: No


Contraindication/Reason for not providing: has aicd; renal insufficiency


Hydralazine Nitrate Prescribed: No


Contraindication/Reason for not providing: has aicd; renal insufficiency


Implantable Cardioverter Defibrillator Therapy: Yes


Contraindication/Reason for not providing: has aicd


Cardiac Resynchronization Therapy Prescribed: No


Contraindication/Reason for not providing: has aicd





- Follow up


Will be discharged to: Home


Follow Up Date (must be within 7 days from discharge): 10/30/18


Follow Up Time: 09:00

## 2019-01-14 ENCOUNTER — HOSPITAL ENCOUNTER (INPATIENT)
Dept: HOSPITAL 31 - C.ER | Age: 59
LOS: 3 days | Discharge: ANOTHER HOSPITAL | DRG: 292 | End: 2019-01-17
Attending: INTERNAL MEDICINE | Admitting: INTERNAL MEDICINE
Payer: COMMERCIAL

## 2019-01-14 VITALS — BODY MASS INDEX: 26.7 KG/M2

## 2019-01-14 DIAGNOSIS — I11.0: Primary | ICD-10-CM

## 2019-01-14 DIAGNOSIS — Z95.5: ICD-10-CM

## 2019-01-14 DIAGNOSIS — Z95.810: ICD-10-CM

## 2019-01-14 DIAGNOSIS — I50.23: ICD-10-CM

## 2019-01-14 DIAGNOSIS — J06.9: ICD-10-CM

## 2019-01-14 DIAGNOSIS — K74.60: ICD-10-CM

## 2019-01-14 DIAGNOSIS — I25.10: ICD-10-CM

## 2019-01-14 DIAGNOSIS — I25.5: ICD-10-CM

## 2019-01-14 DIAGNOSIS — R18.8: ICD-10-CM

## 2019-01-14 DIAGNOSIS — Z87.891: ICD-10-CM

## 2019-01-14 DIAGNOSIS — Z95.0: ICD-10-CM

## 2019-01-14 LAB
ALBUMIN SERPL-MCNC: 4.1 G/DL (ref 3.5–5)
ALBUMIN/GLOB SERPL: 1.3 {RATIO} (ref 1–2.1)
ALT SERPL-CCNC: 59 U/L (ref 21–72)
APTT BLD: 31 SECONDS (ref 21–34)
AST SERPL-CCNC: 40 U/L (ref 17–59)
BASOPHILS # BLD AUTO: 0.1 K/UL (ref 0–0.2)
BASOPHILS NFR BLD: 0.6 % (ref 0–2)
BNP SERPL-MCNC: 4820 PG/ML (ref 0–900)
BUN SERPL-MCNC: 21 MG/DL (ref 9–20)
CALCIUM SERPL-MCNC: 8.8 MG/DL (ref 8.6–10.4)
CK MB SERPL-MCNC: 5.51 NG/ML (ref 0–3.38)
EOSINOPHIL # BLD AUTO: 0 K/UL (ref 0–0.7)
EOSINOPHIL NFR BLD: 0.5 % (ref 0–4)
EOSINOPHIL NFR BLD: 1 % (ref 0–4)
ERYTHROCYTE [DISTWIDTH] IN BLOOD BY AUTOMATED COUNT: 18.4 % (ref 11.5–14.5)
GFR NON-AFRICAN AMERICAN: > 60
HGB BLD-MCNC: 13 G/DL (ref 12–18)
INR PPP: 1.5
LYMPHOCYTE: 9 % (ref 20–40)
LYMPHOCYTES # BLD AUTO: 0.7 K/UL (ref 1–4.3)
LYMPHOCYTES NFR BLD AUTO: 7.9 % (ref 20–40)
MCH RBC QN AUTO: 29.2 PG (ref 27–31)
MCHC RBC AUTO-ENTMCNC: 32.4 G/DL (ref 33–37)
MCV RBC AUTO: 90 FL (ref 80–94)
MONOCYTE: 7 % (ref 0–10)
MONOCYTES # BLD: 0.7 K/UL (ref 0–0.8)
MONOCYTES NFR BLD: 7.7 % (ref 0–10)
NEUTROPHILS # BLD: 7.6 K/UL (ref 1.8–7)
NEUTROPHILS NFR BLD AUTO: 83 % (ref 50–75)
NEUTROPHILS NFR BLD AUTO: 83.3 % (ref 50–75)
NRBC BLD AUTO-RTO: 0 % (ref 0–2)
PLATELET # BLD EST: NORMAL 10*3/UL
PLATELET # BLD: 200 K/UL (ref 130–400)
PMV BLD AUTO: 9 FL (ref 7.2–11.7)
PROTHROMBIN TIME: 16.3 SECONDS (ref 9.7–12.2)
RBC # BLD AUTO: 4.44 MIL/UL (ref 4.4–5.9)
TOTAL CELLS COUNTED BLD: 100
WBC # BLD AUTO: 9.1 K/UL (ref 4.8–10.8)

## 2019-01-14 RX ADMIN — IPRATROPIUM BROMIDE AND ALBUTEROL SULFATE SCH: .5; 3 SOLUTION RESPIRATORY (INHALATION) at 20:08

## 2019-01-14 RX ADMIN — IPRATROPIUM BROMIDE AND ALBUTEROL SULFATE SCH ML: .5; 3 SOLUTION RESPIRATORY (INHALATION) at 16:12

## 2019-01-14 RX ADMIN — IPRATROPIUM BROMIDE AND ALBUTEROL SULFATE SCH ML: .5; 3 SOLUTION RESPIRATORY (INHALATION) at 23:56

## 2019-01-14 NOTE — CP.PCM.HP
History of Present Illness





- History of Present Illness


History of Present Illness: 





58 y.o.male with PMH 


CHF


CAD 


Cirrhosis


Recurrent pleural effusion and ascites


 admitted due to increasing SOB, 


patient reports had, URI cough colds a week earlier- then evelop inceasing SOB 

and worsened hence ER. in ER_ CXR revealed  large right sided effusion , Patient

was given Lasix and was subsequently admitted for further treatment 





ROS 


as above


no chest pain


no fever


slight cough


prior URI a week ago 


no leg swelling 


no abdominal distention , some losse stools few vidal 


no vomiting 


no bleeding 








PMH 


as above


Surgery


right foot surgery


has Defibrillator 





Social


non smoker


non ETOH 








Present on Admission





- Present on Admission


Any Indicators Present on Admission: No


History of DVT/PE: No


History of Uncontrolled Diabetes: No


Urinary Catheter: No


Decubitus Ulcer Present: No





Review of Systems





- Constitutional


Constitutional: absent: Fever, Headache, Weight Loss





- EENT


Eyes: absent: Other Visual Disturbances


Ears: absent: Disequilibrium


Nose/Mouth/Throat: absent: Epistaxis, Dysphagia





- Cardiovascular


Cardiovascular: Dyspnea, Dyspnea on Exertion.  absent: Palpitations, Syncope





- Respiratory


Respiratory: Cough, Dyspnea on Exertion, Wheezing, Chest Congestion





- Gastrointestinal


Gastrointestinal: Diarrhea.  absent: Abdominal Pain





- Genitourinary


Genitourinary: Difficulty Urinating, Flank Pain





- Reproductive: Male


Reproductive:Male: Genital Lesions





- Musculoskeletal


Musculoskeletal: Abnormal Gait, Deformity, Joint Swelling





- Integumentary


Integumentary: Rash, Jaundice





- Neurological


Neurological: Abnormal Gait, Abnormal Movements, Syncope, Tremor





- Psychiatric


Psychiatric: Behavioral Changes, Confusion, Depression, Memory Loss





- Endocrine


Endocrine: Fatigue





- Hematologic/Lymphatic


Hematologic: Easy Bleeding, Easy Bruising





Past Patient History





- Infectious Disease


Hx of Infectious Diseases: None





- Past Medical History & Family History


Past Medical History?: Yes





- Past Social History


Smoking Status: Former Smoker





- CARDIAC


Hx Cardiac Disorders: Yes


Hx Congestive Heart Failure: Yes


Hx Hypertension: Yes


Hx Pacemaker: Yes (DEFIBRILLATOR/PACEMAKER)


Hx Peripheral Edema: Yes





- PULMONARY


Hx Respiratory Disorders: Yes


Hx Bronchitis: Yes


Hx Pneumonia: Yes (1/2018)





- NEUROLOGICAL


Hx Neurological Disorder: No





- HEENT


Hx HEENT Problems: No





- RENAL


Hx Chronic Kidney Disease: No





- ENDOCRINE/METABOLIC


Hx Endocrine Disorders: No





- HEMATOLOGICAL/ONCOLOGICAL


Hx Blood Disorders: No





- INTEGUMENTARY


Hx Dermatological Problems: No





- MUSCULOSKELETAL/RHEUMATOLOGICAL


Hx Musculoskeletal Disorders: Yes


Hx Falls: Yes





- GASTROINTESTINAL


Hx Gastrointestinal Disorders: Yes


Hx Diarrhea: Yes


Other/Comment: hx : H. Pylori





- GENITOURINARY/GYNECOLOGICAL


Hx Genitourinary Disorders: No





- PSYCHIATRIC


Hx Psychophysiologic Disorder: No


Hx Substance Use: No





- SURGICAL HISTORY


Hx Surgeries: Yes


Hx Coronary Stent: Yes





- ANESTHESIA


Hx Anesthesia: Yes


Hx Anesthesia Reactions: No


Hx Malignant Hyperthermia: No





Meds


Allergies/Adverse Reactions: 


                                    Allergies











Allergy/AdvReac Type Severity Reaction Status Date / Time


 


No Known Allergies Allergy   Verified 01/14/19 10:13














Physical Exam





- Constitutional


Appears: In Acute Distress (seen after diuresis, talks in sentences, but at 

times gasping for air , very much aware of condition )





- Head Exam


Head Exam: ATRAUMATIC, NORMOCEPHALIC





- Eye Exam


Eye Exam: Normal appearance.  absent: Periorbital swelling





- ENT Exam


ENT Exam: Mucous Membranes Moist





- Neck Exam


Neck exam: Positive for: Full Rom.  Negative for: Tenderness





- Respiratory Exam


Respiratory Exam: Decreased Breath Sounds, Wheezes





- GI/Abdominal Exam


GI & Abdominal Exam: Normal Bowel Sounds, Soft.  absent: Distended, Tenderness





- Extremities Exam


Extremities exam: Positive for: full ROM, pedal pulses present.  Negative for: 

joint swelling, pedal edema, tenderness





- Back Exam


Back exam: absent: rash noted





- Neurological Exam


Neurological exam: Alert, Normal Gait, Oriented x3





- Psychiatric Exam


Psychiatric exam: Normal Affect, Normal Mood





- Skin


Skin Exam: Intact, Normal Color





Results





- Vital Signs


Recent Vital Signs: 





                                Last Vital Signs











Temp  97.8 F   01/14/19 13:18


 


Pulse  80   01/14/19 13:18


 


Resp  24   01/14/19 13:28


 


BP  155/101 H  01/14/19 13:18


 


Pulse Ox  100   01/14/19 13:28














- Labs


Result Diagrams: 


                                 01/14/19 11:07





                                 01/14/19 11:07


Labs: 





                         Laboratory Results - last 24 hr











  01/14/19 01/14/19 01/14/19





  11:07 11:07 11:07


 


WBC  9.1  


 


RBC  4.44  


 


Hgb  13.0  


 


Hct  40.0  


 


MCV  90.0  D  


 


MCH  29.2  


 


MCHC  32.4 L  


 


RDW  18.4 H  


 


Plt Count  200  


 


MPV  9.0  


 


Neut % (Auto)  83.3 H  


 


Lymph % (Auto)  7.9 L  


 


Mono % (Auto)  7.7  


 


Eos % (Auto)  0.5  


 


Baso % (Auto)  0.6  


 


Neut # (Auto)  7.6 H  


 


Lymph # (Auto)  0.7 L  


 


Mono # (Auto)  0.7  


 


Eos # (Auto)  0.0  


 


Baso # (Auto)  0.1  


 


Neutrophils % (Manual)  83 H  


 


Lymphocytes % (Manual)  9 L  


 


Monocytes % (Manual)  7  


 


Eosinophils % (Manual)  1  


 


Platelet Estimate  Normal  


 


PT   16.3 H 


 


INR   1.5 


 


APTT   31 


 


Sodium    141


 


Potassium    4.4


 


Chloride    106


 


Carbon Dioxide    28


 


Anion Gap    12


 


BUN    21 H


 


Creatinine    1.0


 


Est GFR ( Amer)    > 60


 


Est GFR (Non-Af Amer)    > 60


 


Random Glucose    135 H D


 


Calcium    8.8


 


Total Bilirubin    2.1 H


 


AST    40


 


ALT    59


 


Alkaline Phosphatase    172 H D


 


Total Creatine Kinase    195 H


 


CK-MB (Mass)    5.51 H


 


Troponin I    0.0500


 


NT-Pro-B Natriuret Pep    4820 H


 


Total Protein    7.3


 


Albumin    4.1


 


Globulin    3.2


 


Albumin/Globulin Ratio    1.3














Assessment & Plan





- Assessment and Plan (Free Text)


Assessment: 





Patient with multiple medical Problems, history of CAD /CHF/Liver Cirrhosis


admitted for worsening SOB- found to have large right sided Pleural Effusion


CHF exacerbation


MODESTO


with recent URI


check CT 


Pulmo consult 


will culture and cover with antibiotics r/o pneumonia





CHF exacerbation


Dietary education- aware 


Diuretics


medication review and adjustment 


Discussion with cardio


repeat ECHO








GI prophylaxis


DVT prophylaxis accordingy after procedure 











- Date & Time


Date: 01/14/19


Time: 02:30

## 2019-01-14 NOTE — C.PDOC
History Of Present Illness


Patient is a 58 year old male, with a PMHx of CHF and ascites, presenting to the

ED with c/o abdominal distension, upper abdominal discomfort, and SOB for the 

past 2 days. Patient notes an associated nonproductive cough and nasal con

gestion. He denies any chest pain, nausea, vomiting, diarrhea, fever, or chills.




Time Seen by Provider: 19 10:28


Chief Complaint (Nursing): Shortness Of Breath


History Per: Patient


History/Exam Limitations: no limitations


Onset/Duration Of Symptoms: Days (2 )


Current Symptoms Are (Timing): Still Present


Associated Symptoms: denies: Fever, Chills, Nausea, Vomiting, Diarrhea, Chest 

Pain


Recent travel outside of the [place]: No


Additional History Per: Patient





Past Medical History


Reviewed: Historical Data, Nursing Documentation, Vital Signs


Vital Signs: 





                                Last Vital Signs











Temp  97.7 F   19 10:09


 


Pulse  77   19 11:35


 


Resp  22   19 11:35


 


BP  149/101 H  19 11:35


 


Pulse Ox  95   19 11:35














- Medical History


PMH: Bronchitis, CAD, CHF, HTN, Peripheral Edema


   Denies: Chronic Kidney Disease


Surgical History: Coronary Stent, Pacemaker (DEFIBRILLATOR/PACEMAKER)





- [facility] Procedures











CORONAR ARTERIOGR-2 CATH (07/27/15)


DRAINAGE OF RIGHT PLEURAL CAVITY, PERCUTANEOUS APPROACH (10/22/18)


INSERTION OF ONE VASCULAR STENT (07/27/15)


INSRT OF DRUG-ELUTING CORON ARTERY STENTS(S) (07/27/15)


LEFT HEART CARDIAC CATH (07/27/15)


LT HEART ANGIOCARDIOGRAM (07/27/15)


PERCUTANEOUS TRANSLUMINAL CORONARY ANGIOPLASTY [PTCA] (07/27/15)


PROCEDURE ON SINGLE VESSEL (07/27/15)








Family History: States: Unknown Family Hx





- Social History


Hx Tobacco Use: Yes


Hx Alcohol Use: No


Hx Substance Use: No





- Immunization History


Hx Tetanus Toxoid Vaccination: No


Hx Influenza Vaccination: No


Hx Pneumococcal Vaccination: No





Review Of Systems


Constitutional: Negative for: Fever, Chills


Cardiovascular: Negative for: Chest Pain


Respiratory: Positive for: Cough (nonproductive ), Shortness of Breath, Other 

(nasla congestion )


Gastrointestinal: Positive for: Abdominal Pain (abdominal distension and upper 

abdominal discomfort ).  Negative for: Nausea, Vomiting, Diarrhea





Physical Exam





- Physical Exam


Appears: Non-toxic, No Acute Distress


Skin: Normal Color, Warm, Dry


Head: Atraumatic, Normacephalic


Oral Mucosa: Moist


Neck: Normal ROM, Supple


Chest: Symmetrical, No Deformity


Cardiovascular: Rhythm Regular, No Murmur


Respiratory: Rales (basis bilateral )


Gastrointestinal/Abdominal: Tenderness (epigastric tenderness to palpation ), 

Distention (moderate ), Ascites, No Other (Reddy's and McBurney's )


Extremity: Pedal Edema (+1 bilateral lower extremities )


Neurological/Psych: Oriented x3





ED Course And Treatment





- Laboratory Results


Result Diagrams: 


                                 19 11:07





                                 19 11:07


Lab Results: 





                                        











PT  16.3 SECONDS (9.7-12.2)  H  19  11:07    


 


INR  1.5   19  11:07    


 


APTT  31 SECONDS (21-34)   19  11:07    








                                        











Troponin I  0.0500 ng/mL (0.00-0.120)   19  11:07    








                                        











NT-Pro-B Natriuret Pep  4820 pg/mL (0-900)  H  19  11:07    








                                        











Total Bilirubin  2.1 mg/dL (0.2-1.3)  H  19  11:07    


 


AST  40 U/L (17-59)   19  11:07    


 


ALT  59 U/L (21-72)   19  11:07    


 


Alkaline Phosphatase  172 U/L ()  H D 19  11:07    


 


Total Protein  7.3 g/dL (6.3-8.3)   19  11:07    


 


Albumin  4.1 g/dL (3.5-5.0)   19  11:07    


 


Globulin  3.2 gm/dL (2.2-3.9)   19  11:07    


 


Albumin/Globulin Ratio  1.3  (1.0-2.1)   19  11:07    











ECG: Interpreted By Me, Viewed By Me


ECG Rhythm: Sinus Rhythm


Interpretation Of ECG: Left axis deviation, T wave inversion 1/2 aVL v5 v6, no 

acute ST/T wave changes


Rate From EC


O2 Sat by Pulse Oximetry: 95 (on RA)


Pulse Ox Interpretation: Normal





- Other Rad


  ** CXR


X-Ray: Viewed By Me, Read By Radiologist


Interpretation: FINDINGS:  LUNGS:  Moderate to large right-sided pleural 

effusion and/or consolidation.  The left hemithorax appears grossly clear.  No 

definite pneumothorax.  CARDIOVASCULAR:  Partially obscured cardiomegaly.  

Atherosclerotic calcifications present.  Dual lead left-sided AICD.  OSSEOUS 

STRUCTURES:  Degenerative changes.  VISUALIZED UPPER ABDOMEN:  Unremarkable.  

OTHER FINDINGS:  None.  IMPRESSION:  Moderate to large right-sided pleural effu

gnee and/or consolidation. Partially obscured cardiomegaly.


Progress Note: Bloodwork, CXR, EKG were ordered and reviewed. Lasix 40mg IVP was

 administered.  1201 Consult with Dr. Vizcarra.





Disposition





- Disposition


Forms:  [facility] (English)





- Scribe Statement


The provider has reviewed the documentation as recorded by the Jessicaibdarrin Yuan 





All medical record entries made by the Scribe were at my direction and 

personally dictated by me. I have reviewed the chart and agree that the record 

accurately reflects my personal performance of the history, physical exam, 

medical decision making, and the department course for this patient. I have also

 personally directed, reviewed, and agree with the discharge instructions and 

disposition.

## 2019-01-14 NOTE — CP.PCM.CON
History of Present Illness





- History of Present Illness


History of Present Illness: 





reason for consultation: shortness of breath





58-year-old male with history off liver cirrhosis, coronary artery disease, CHF,

recurrent pleural effusion status post thoracentesis, ascites presented to 

emergency room with worsening shortness of breath for the past one week. Denies 

cough, denies fever chills, denies chest pain. Chest x-ray consistent with large

right-sided pleural effusion


 





Review of Systems





- Review of Systems


All systems: reviewed and no additional remarkable complaints except (shortness 

of breath)





Past Patient History





- Infectious Disease


Hx of Infectious Diseases: None





- Past Medical History & Family History


Past Medical History?: Yes





- Past Social History


Smoking Status: Former Smoker





- CARDIAC


Hx Cardiac Disorders: Yes


Hx Congestive Heart Failure: Yes


Hx Hypertension: Yes


Hx Pacemaker: Yes (DEFIBRILLATOR/PACEMAKER)


Hx Peripheral Edema: Yes





- PULMONARY


Hx Respiratory Disorders: Yes


Hx Bronchitis: Yes


Hx Pneumonia: Yes (1/2018)





- NEUROLOGICAL


Hx Neurological Disorder: No





- HEENT


Hx HEENT Problems: No





- RENAL


Hx Chronic Kidney Disease: No





- ENDOCRINE/METABOLIC


Hx Endocrine Disorders: No





- HEMATOLOGICAL/ONCOLOGICAL


Hx Blood Disorders: No





- INTEGUMENTARY


Hx Dermatological Problems: No





- MUSCULOSKELETAL/RHEUMATOLOGICAL


Hx Musculoskeletal Disorders: Yes


Hx Falls: Yes





- GASTROINTESTINAL


Hx Gastrointestinal Disorders: Yes


Hx Diarrhea: Yes


Other/Comment: hx : H. Pylori





- GENITOURINARY/GYNECOLOGICAL


Hx Genitourinary Disorders: No





- PSYCHIATRIC


Hx Psychophysiologic Disorder: No


Hx Substance Use: No





- SURGICAL HISTORY


Hx Surgeries: Yes


Hx Coronary Stent: Yes





- ANESTHESIA


Hx Anesthesia: Yes


Hx Anesthesia Reactions: No


Hx Malignant Hyperthermia: No





Meds


Allergies/Adverse Reactions: 


                                    Allergies











Allergy/AdvReac Type Severity Reaction Status Date / Time


 


No Known Allergies Allergy   Verified 01/14/19 10:13














- Medications


Medications: 


                               Current Medications





Albuterol/Ipratropium (Duoneb 3 Mg/0.5 Mg (3 Ml) Ud)  3 ml INH RQ4 Pending sale to [facility]


   Last Admin: 01/14/19 16:12 Dose:  3 ml


Aspirin (Ecotrin)  1 mg PO DAILY Pending sale to [facility]


Carvedilol (Coreg)  3.125 mg PO BID Pending sale to [facility]


   Last Admin: 01/14/19 15:48 Dose:  3.125 mg


Furosemide (Lasix)  40 mg PO DAILY Pending sale to [facility]


Ceftriaxone Sodium 2 gm/ (Sodium Chloride)  100 mls @ 100 mls/hr IVPB DAILY Pending sale to [facility];

Protocol


   Last Admin: 01/14/19 15:47 Dose:  100 mls/hr


Losartan Potassium (Cozaar)  25 mg PO DAILY Pending sale to [facility]


   Last Admin: 01/14/19 15:47 Dose:  25 mg


Potassium Chloride (Klor-Con 10)  10 meq PO DAILY Pending sale to [facility]


Rosuvastatin Calcium (Crestor)  10 mg PO HS Pending sale to [facility]


Spironolactone (Aldactone)  25 mg PO BID LETICIA


   Last Admin: 01/14/19 15:47 Dose:  25 mg











Physical Exam





- Head Exam


Head Exam: ATRAUMATIC, NORMOCEPHALIC





- ENT Exam


ENT Exam: Mucous Membranes Moist





- Neck Exam


Neck exam: Positive for: Normal Inspection





- Respiratory Exam


Respiratory Exam: Decreased Breath Sounds





- Cardiovascular Exam


Cardiovascular Exam: REGULAR RHYTHM





- GI/Abdominal Exam


GI & Abdominal Exam: Normal Bowel Sounds, Soft





- Extremities Exam


Extremities exam: Positive for: normal inspection





Results





- Vital Signs


Recent Vital Signs: 


                                Last Vital Signs











Temp  97.8 F   01/14/19 13:18


 


Pulse  80   01/14/19 15:24


 


Resp  22   01/14/19 15:24


 


BP  131/81   01/14/19 15:24


 


Pulse Ox  97   01/14/19 15:24














- Labs


Result Diagrams: 


                                 01/14/19 11:07





                                 01/14/19 11:07


Labs: 


                         Laboratory Results - last 24 hr











  01/14/19 01/14/19 01/14/19





  11:07 11:07 11:07


 


WBC  9.1  


 


RBC  4.44  


 


Hgb  13.0  


 


Hct  40.0  


 


MCV  90.0  D  


 


MCH  29.2  


 


MCHC  32.4 L  


 


RDW  18.4 H  


 


Plt Count  200  


 


MPV  9.0  


 


Neut % (Auto)  83.3 H  


 


Lymph % (Auto)  7.9 L  


 


Mono % (Auto)  7.7  


 


Eos % (Auto)  0.5  


 


Baso % (Auto)  0.6  


 


Neut # (Auto)  7.6 H  


 


Lymph # (Auto)  0.7 L  


 


Mono # (Auto)  0.7  


 


Eos # (Auto)  0.0  


 


Baso # (Auto)  0.1  


 


Neutrophils % (Manual)  83 H  


 


Lymphocytes % (Manual)  9 L  


 


Monocytes % (Manual)  7  


 


Eosinophils % (Manual)  1  


 


Platelet Estimate  Normal  


 


PT   16.3 H 


 


INR   1.5 


 


APTT   31 


 


Sodium    141


 


Potassium    4.4


 


Chloride    106


 


Carbon Dioxide    28


 


Anion Gap    12


 


BUN    21 H


 


Creatinine    1.0


 


Est GFR ( Amer)    > 60


 


Est GFR (Non-Af Amer)    > 60


 


Random Glucose    135 H D


 


Calcium    8.8


 


Total Bilirubin    2.1 H


 


AST    40


 


ALT    59


 


Alkaline Phosphatase    172 H D


 


Total Creatine Kinase    195 H


 


CK-MB (Mass)    5.51 H


 


Troponin I    0.0500


 


NT-Pro-B Natriuret Pep    4820 H


 


Total Protein    7.3


 


Albumin    4.1


 


Globulin    3.2


 


Albumin/Globulin Ratio    1.3














Assessment & Plan


(1) Pleural effusion, right


Status: Acute   


Comment: recurrent right-sided pleural effusion secondary to ascites.  

Thoracentesis.  Continue diuretics   





(2) Ascites


Status: Acute   





(3) Cirrhosis of liver


Status: Acute

## 2019-01-14 NOTE — RAD
HISTORY:

 SOB 



COMPARISON:

Chest x-ray performed 10/25/18 



TECHNIQUE:

Chest, one view.



FINDINGS:





LUNGS:

Moderate to large right-sided pleural effusion and/or consolidation.  

The left hemithorax appears grossly clear.  No definite pneumothorax. 



CARDIOVASCULAR:

Partially obscured cardiomegaly.  Atherosclerotic calcifications 

present.  Dual lead left-sided AICD. 



OSSEOUS STRUCTURES:

Degenerative changes.



VISUALIZED UPPER ABDOMEN:

Unremarkable.



OTHER FINDINGS:

None.



IMPRESSION:

Moderate to large right-sided pleural effusion and/or consolidation. 

Partially obscured cardiomegaly.

## 2019-01-14 NOTE — C.PDOC
Time Seen by Provider: 01/14/19 10:28


Chief Complaint (Nursing): Shortness Of Breath





Past Medical History


Vital Signs: 





                                Last Vital Signs











Temp  97.7 F   01/14/19 10:09


 


Pulse  77   01/14/19 11:35


 


Resp  22   01/14/19 11:35


 


BP  149/101 H  01/14/19 11:35


 


Pulse Ox  95   01/14/19 11:35














- Medical History


PMH: Bronchitis, CAD, CHF, HTN, Peripheral Edema


   Denies: Chronic Kidney Disease


Surgical History: Coronary Stent, Pacemaker (DEFIBRILLATOR/PACEMAKER)





- [facility] Procedures











CORONAR ARTERIOGR-2 CATH (07/27/15)


DRAINAGE OF RIGHT PLEURAL CAVITY, PERCUTANEOUS APPROACH (10/22/18)


INSERTION OF ONE VASCULAR STENT (07/27/15)


INSRT OF DRUG-ELUTING CORON ARTERY STENTS(S) (07/27/15)


LEFT HEART CARDIAC CATH (07/27/15)


LT HEART ANGIOCARDIOGRAM (07/27/15)


PERCUTANEOUS TRANSLUMINAL CORONARY ANGIOPLASTY [PTCA] (07/27/15)


PROCEDURE ON SINGLE VESSEL (07/27/15)








Family History: States: Unknown Family Hx





- Social History


Hx Tobacco Use: Yes


Hx Alcohol Use: No


Hx Substance Use: No





- Immunization History


Hx Tetanus Toxoid Vaccination: No


Hx Influenza Vaccination: No


Hx Pneumococcal Vaccination: No





ED Course And Treatment





- Laboratory Results


Result Diagrams: 


                                 01/14/19 11:07





                                 01/14/19 11:07


Lab Results: 





                                        











PT  16.3 SECONDS (9.7-12.2)  H  01/14/19  11:07    


 


INR  1.5   01/14/19  11:07    


 


APTT  31 SECONDS (21-34)   01/14/19  11:07    








                                        











Troponin I  0.0500 ng/mL (0.00-0.120)   01/14/19  11:07    








                                        











NT-Pro-B Natriuret Pep  4820 pg/mL (0-900)  H  01/14/19  11:07    








                                        











Total Bilirubin  2.1 mg/dL (0.2-1.3)  H  01/14/19  11:07    


 


AST  40 U/L (17-59)   01/14/19  11:07    


 


ALT  59 U/L (21-72)   01/14/19  11:07    


 


Alkaline Phosphatase  172 U/L ()  H D 01/14/19  11:07    


 


Total Protein  7.3 g/dL (6.3-8.3)   01/14/19  11:07    


 


Albumin  4.1 g/dL (3.5-5.0)   01/14/19  11:07    


 


Globulin  3.2 gm/dL (2.2-3.9)   01/14/19  11:07    


 


Albumin/Globulin Ratio  1.3  (1.0-2.1)   01/14/19  11:07    











O2 Sat by Pulse Oximetry: 95





- Other Rad


  ** CXR 


X-Ray: Viewed By Me, Read By Radiologist


Interpretation: Accession No. : [id number].  Patient Name / ID : [id number] ADAMES  / [id number].  Exam Date : 01/14/2019 10:50:43 ( Approved ).  Study Comment

 :  Sex / Age : M  / 58Y.  Creator : Kayla Echeverria MD.  Dictator : Kayla Echeverria MD.   :  Approver : Kayla Echeverria MD.  

Approver2 :  Report Date : 01/14/2019 11:18:15.  My Comment :  

**************************************************************************

*********.  HISTORY:  SOB.  COMPARISON:  Chest x-ray performed 10/25/18.  

TECHNIQUE:  Chest, one view.  FINDINGS:  LUNGS:  Moderate to large right-sided 

pleural effusion and/or consolidation.  The left hemithorax appears grossly 

clear.  No definite pneumothorax.  CARDIOVASCULAR:  Partially obscured 

cardiomegaly.  Atherosclerotic calcifications present.  Dual lead left-sided 

AICD.  OSSEOUS STRUCTURES:  Degenerative changes.  VISUALIZED UPPER ABDOMEN:  

Unremarkable.  OTHER FINDINGS:  None.  IMPRESSION:  Moderate to large right-

sided pleural effusion and/or consolidation. Partially obscured cardiomegaly.





Disposition





- Disposition


Forms:  [facility] (English)

## 2019-01-15 LAB
APPEARANCE FLD: (no result)
BODY FLD TYPE: (no result)
BODY FLUID MONO/MACROPHAGE: 2 % (ref 0–0)
CELL CNT PNL FLD: 100 (ref 0–0)
CK MB SERPL-MCNC: 2.83 NG/ML (ref 0–3.38)
CK MB SERPL-MCNC: 3.33 NG/ML (ref 0–3.38)
RBC # FLD AUTO: 466 /MM3 (ref 0–0)
TROPONIN I SERPL-MCNC: 0.03 NG/ML (ref 0–0.12)
TROPONIN I SERPL-MCNC: 0.05 NG/ML (ref 0–0.12)
WBC # FLD: 172 /MM3 (ref 0–300)

## 2019-01-15 RX ADMIN — IPRATROPIUM BROMIDE AND ALBUTEROL SULFATE SCH ML: .5; 3 SOLUTION RESPIRATORY (INHALATION) at 07:57

## 2019-01-15 RX ADMIN — IPRATROPIUM BROMIDE AND ALBUTEROL SULFATE SCH ML: .5; 3 SOLUTION RESPIRATORY (INHALATION) at 13:54

## 2019-01-15 RX ADMIN — IPRATROPIUM BROMIDE AND ALBUTEROL SULFATE SCH ML: .5; 3 SOLUTION RESPIRATORY (INHALATION) at 20:44

## 2019-01-15 RX ADMIN — IPRATROPIUM BROMIDE AND ALBUTEROL SULFATE SCH ML: .5; 3 SOLUTION RESPIRATORY (INHALATION) at 04:50

## 2019-01-15 RX ADMIN — ENOXAPARIN SODIUM SCH: 40 INJECTION SUBCUTANEOUS at 13:23

## 2019-01-15 RX ADMIN — POTASSIUM CHLORIDE SCH MEQ: 10 TABLET, FILM COATED, EXTENDED RELEASE ORAL at 11:03

## 2019-01-15 NOTE — CP.PCM.PN
<Martita Mai - Last Filed: 01/15/19 18:20>





Subjective





- Date & Time of Evaluation


Date of Evaluation: 01/15/19


Time of Evaluation: 09:00





- Subjective


Subjective: 





Cardiology Progress Note:





Patient was seen and examined at bedside.  Nurse at bedside states patient just 

had a thoracentesis which removed about [id number] of fluids.  Patient states he 

has had this procedure in the past.  He states he usually coughs for awhile 

after the procedure which makes him short of breath.  Patient denies chest pain,

nausea, vomiting, fever or chills. 





Objective





- Vital Signs/Intake and Output


Vital Signs (last 24 hours): 


                                        











Temp Pulse Resp BP Pulse Ox


 


 97.8 F   84   18   127/82   93 L


 


 01/15/19 13:35  01/15/19 13:35  01/15/19 13:35  01/15/19 13:35  01/15/19 13:35











- Medications


Medications: 


                               Current Medications





Albuterol/Ipratropium (Duoneb 3 Mg/0.5 Mg (3 Ml) Ud)  3 ml INH RQ4 [facility]


   Last Admin: 01/15/19 13:54 Dose:  3 ml


Aspirin (Ecotrin)  81 mg PO DAILY [facility]


Carvedilol (Coreg)  3.125 mg PO BID [facility]


   Last Admin: 01/15/19 11:03 Dose:  3.125 mg


Enoxaparin Sodium (Lovenox)  40 mg SC DAILY [facility]


   Last Admin: 01/15/19 13:23 Dose:  Not Given


Furosemide (Lasix)  40 mg PO DAILY [facility]


   Last Admin: 01/15/19 11:03 Dose:  40 mg


Ceftriaxone Sodium 2 gm/ (Sodium Chloride)  100 mls @ 100 mls/hr IVPB DAILY [facility];

Protocol


   Last Admin: 01/15/19 11:04 Dose:  100 mls/hr


Losartan Potassium (Cozaar)  25 mg PO DAILY [facility]


   Last Admin: 01/15/19 11:03 Dose:  25 mg


Potassium Chloride (Klor-Con 10)  10 meq PO DAILY [facility]


   Last Admin: 01/15/19 11:03 Dose:  10 meq


Rosuvastatin Calcium (Crestor)  10 mg PO HS [facility]


   Last Admin: 01/14/19 22:18 Dose:  10 mg


Spironolactone (Aldactone)  25 mg PO BID [facility]


   Last Admin: 01/15/19 11:03 Dose:  25 mg











- Labs


Labs: 


                                        





                                 01/14/19 11:07 





                                 01/14/19 11:07 





                                        











PT  16.3 SECONDS (9.7-12.2)  H  01/14/19  11:07    


 


INR  1.5   01/14/19  11:07    


 


APTT  31 SECONDS (21-34)   01/14/19  11:07    














- Constitutional


Appears: No Acute Distress, Chronically Ill





- Head Exam


Head Exam: ATRAUMATIC, NORMAL INSPECTION





- Eye Exam


Eye Exam: EOMI, Normal appearance





- ENT Exam


ENT Exam: Mucous Membranes Moist





- Respiratory Exam


Respiratory Exam: Clear to Ausculation Bilateral, NORMAL BREATHING PATTERN





- Cardiovascular Exam


Cardiovascular Exam: REGULAR RHYTHM, +S1, +S2





- GI/Abdominal Exam


GI & Abdominal Exam: Normal Bowel Sounds, Organomegaly.  absent: Distended, 

Tenderness





- Extremities Exam


Extremities Exam: Normal Inspection.  absent: Pedal Edema, Tenderness





- Neurological Exam


Neurological Exam: Alert, Awake, Oriented x3





- Psychiatric Exam


Psychiatric exam: Normal Affect





Assessment and Plan





- Assessment and Plan (Free Text)


Assessment: 





58 year old male with past medical history of ascites and CHF (with an EF <35%) 

presented to the ED with complaints shortness of breath, pedal edema and 

ascites. 





Systolic Congestive Heart Failure - Stage D 


Continue lasix 40mg daily


Coreg 3.125mg po bid


Aspirin 81mg daily 


Losartan 25mg po daily 


          Patient likely a good candidate for LVAD


          Will transfer him to [place] for further heart failure therapy





Hypertension


Continue ACEI and B blockers


will consider changing to Entresto





Coronary artery disease


Continue ASA 81


Crestor 10mg po HS


s/p stents





Case discussed with Dr. Kerwin Mai PGY-2 





<Rashaad Suresh - Last Filed: 01/16/19 22:49>





Objective





- Vital Signs/Intake and Output


Vital Signs (last 24 hours): 


                                        











Temp Pulse Resp BP Pulse Ox


 


 97.8 F   74   18   98/62 L  99 


 


 01/16/19 17:38  01/16/19 21:37  01/16/19 17:38  01/16/19 17:38  01/16/19 17:38








Intake and Output: 


                                        











 01/16/19 01/17/19





 18:59 06:59


 


Intake Total  300


 


Balance  300














- Medications


Medications: 


                               Current Medications





Albuterol/Ipratropium (Duoneb 3 Mg/0.5 Mg (3 Ml) Ud)  3 ml INH RQ4 [facility]


   Last Admin: 01/16/19 20:28 Dose:  Not Given


Aspirin (Ecotrin)  81 mg PO DAILY [facility]


   Last Admin: 01/16/19 09:21 Dose:  81 mg


Carvedilol (Coreg)  3.125 mg PO BID [facility]


   Last Admin: 01/16/19 18:03 Dose:  3.125 mg


Enoxaparin Sodium (Lovenox)  40 mg SC DAILY [facility]


   Last Admin: 01/16/19 09:22 Dose:  40 mg


Furosemide (Lasix)  40 mg PO DAILY [facility]


   Last Admin: 01/16/19 09:21 Dose:  40 mg


Ceftriaxone Sodium 2 gm/ (Sodium Chloride)  100 mls @ 100 mls/hr IVPB DAILY [facility];

Protocol


   Last Admin: 01/16/19 09:31 Dose:  100 mls/hr


Losartan Potassium (Cozaar)  25 mg PO DAILY [facility]


   Last Admin: 01/16/19 09:21 Dose:  25 mg


Potassium Chloride (Klor-Con 10)  10 meq PO DAILY [facility]


   Last Admin: 01/16/19 09:21 Dose:  10 meq


Rosuvastatin Calcium (Crestor)  10 mg PO HS [facility]


   Last Admin: 01/16/19 21:40 Dose:  10 mg


Spironolactone (Aldactone)  25 mg PO BID [facility]


   Last Admin: 01/16/19 18:03 Dose:  25 mg











- Labs


Labs: 


                                        





                                 01/16/19 08:31 





                                 01/16/19 08:31 





                                        











PT  16.3 SECONDS (9.7-12.2)  H  01/14/19  11:07    


 


INR  1.5   01/14/19  11:07    


 


APTT  31 SECONDS (21-34)   01/14/19  11:07    














Assessment and Plan





- Assessment and Plan (Free Text)


Assessment: 


Patient examined and evaluated personally by me. Plan of care d/w the resident 

and as documented

## 2019-01-15 NOTE — CP.PCM.CON
History of Present Illness





- History of Present Illness


History of Present Illness: 


CC: Dyspnea, pedal edema and Ascites





58-year-old male, PMHx includes ascites and CHF (Ischemic CMP), with an EF <35%,

presents to the emergency department with complaints shortness of breath, pedal 

edema and ascites. , but denies fever, chest pain, nausea/vomiting, back pain, 

or any other associated symptoms. No other complaints at this time.





Chief Complaint (Nursing): Shortness Of Breath


History Per: Patient


History/Exam Limitations: no limitations


Current Symptoms Are (Timing): Still Present








- Medical History


PMH: CAD, CHF, HTN


Surgical History: Coronary Stent, Pacemaker (DEFIBRILLATOR/PACEMAKER)





- [facility] Procedures











CORONAR ARTERIOGR-2 CATH (07/27/15)


INSERTION OF ONE VASCULAR STENT (07/27/15)


INSRT OF DRUG-ELUTING CORON ARTERY STENTS(S) (07/27/15)


LEFT HEART CARDIAC CATH (07/27/15)


LT HEART ANGIOCARDIOGRAM (07/27/15)


PERCUTANEOUS TRANSLUMINAL CORONARY ANGIOPLASTY [PTCA] (07/27/15)


PROCEDURE ON SINGLE VESSEL (07/27/15)








Family History: States: Unknown Family Hx





- Social History


Hx Tobacco Use: Yes


Hx Alcohol Use: No


Hx Substance Use: No





- Immunization History


Hx Tetanus Toxoid Vaccination: No


Hx Influenza Vaccination: No


Hx Pneumococcal Vaccination: No





 Review Of Systems 


Constitutional: Negative for: Fever


Cardiovascular: Negative for: Chest Pain


Respiratory: Positive for: Cough, Shortness of Breath, SOB with Excertion.  

Negative for: Sputum, Wheezing


Musculoskeletal: Negative for: Back Pain


Skin: Negative for: Rash


Neurological: Negative for: Weakness, Numbness





 Physical Exam 





- Physical Exam


Appears: Non-toxic, No Acute Distress, Other (conversational dyspnea)


Skin: Warm, Dry, No Rash


Head: Atraumatic, Normacephalic


Eye(s): bilateral: Normal Inspection, PERRL


Nose: Normal


Oral Mucosa: Moist


Lips: Normal Appearing


Neck: Normal ROM


Chest: Symmetrical


Cardiovascular: Rhythm Regular, No Murmur


Respiratory: No Accessory Muscle Use, Rales (B/L), No Rhonchi, No Wheezing


Gastrointestinal/Abdominal: No Tenderness, Distention, Ascites


Extremity: Normal ROM, Pedal Edema (pitting, +1), No Deformity


Pulses: Left Dorsalis Pedis: Normal, Right Dorsalis Pedis: Normal


Neurological/Psych: Oriented x3, Normal Speech














Assessment & Plan





- Assessment and Plan (Free Text)


Assessment: 





Acute On Chronic systolic CHF


s/p AICD


Pleural effusion








For thoracentasis tomorrow


IV Lasix 








Past Patient History





- Infectious Disease


Hx of Infectious Diseases: None





- Past Medical History & Family History


Past Medical History?: Yes





- Past Social History


Smoking Status: Former Smoker





- CARDIAC


Hx Cardiac Disorders: Yes


Hx Congestive Heart Failure: Yes


Hx Hypertension: Yes


Hx Pacemaker: Yes (DEFIBRILLATOR/PACEMAKER)


Hx Peripheral Edema: Yes





- PULMONARY


Hx Respiratory Disorders: Yes


Hx Bronchitis: Yes


Hx Pneumonia: Yes (1/2018)





- NEUROLOGICAL


Hx Neurological Disorder: No





- HEENT


Hx HEENT Problems: No





- RENAL


Hx Chronic Kidney Disease: No





- ENDOCRINE/METABOLIC


Hx Endocrine Disorders: No





- HEMATOLOGICAL/ONCOLOGICAL


Hx Blood Disorders: No





- INTEGUMENTARY


Hx Dermatological Problems: No





- MUSCULOSKELETAL/RHEUMATOLOGICAL


Hx Musculoskeletal Disorders: Yes


Hx Falls: Yes





- GASTROINTESTINAL


Hx Gastrointestinal Disorders: Yes


Hx Diarrhea: Yes


Other/Comment: hx : H. Pylori





- GENITOURINARY/GYNECOLOGICAL


Hx Genitourinary Disorders: No





- PSYCHIATRIC


Hx Psychophysiologic Disorder: No


Hx Substance Use: No





- SURGICAL HISTORY


Hx Surgeries: Yes


Hx Coronary Stent: Yes





- ANESTHESIA


Hx Anesthesia: Yes


Hx Anesthesia Reactions: No


Hx Malignant Hyperthermia: No





Meds


Allergies/Adverse Reactions: 


                                    Allergies











Allergy/AdvReac Type Severity Reaction Status Date / Time


 


No Known Allergies Allergy   Verified 01/14/19 10:13














- Medications


Medications: 


                               Current Medications





Albuterol/Ipratropium (Duoneb 3 Mg/0.5 Mg (3 Ml) Ud)  3 ml INH RQ4 [facility]


   Last Admin: 01/15/19 07:57 Dose:  3 ml


Aspirin (Ecotrin)  1 mg PO DAILY [facility]


Carvedilol (Coreg)  3.125 mg PO BID [facility]


   Last Admin: 01/14/19 15:48 Dose:  3.125 mg


Enoxaparin Sodium (Lovenox)  40 mg SC DAILY [facility]


Furosemide (Lasix)  40 mg PO DAILY [facility]


Ceftriaxone Sodium 2 gm/ (Sodium Chloride)  100 mls @ 100 mls/hr IVPB DAILY [facility];

 Protocol


   Last Admin: 01/14/19 15:47 Dose:  100 mls/hr


Losartan Potassium (Cozaar)  25 mg PO DAILY [facility]


   Last Admin: 01/14/19 15:47 Dose:  25 mg


Potassium Chloride (Klor-Con 10)  10 meq PO DAILY [facility]


Rosuvastatin Calcium (Crestor)  10 mg PO HS [facility]


   Last Admin: 01/14/19 22:18 Dose:  10 mg


Spironolactone (Aldactone)  25 mg PO BID [facility]


   Last Admin: 01/14/19 15:47 Dose:  25 mg











Results





- Vital Signs


Recent Vital Signs: 


                                Last Vital Signs











Temp  97.6 F   01/14/19 23:24


 


Pulse  71   01/14/19 23:24


 


Resp  20   01/14/19 23:24


 


BP  113/71   01/14/19 23:24


 


Pulse Ox  99   01/14/19 23:24














- Labs


Result Diagrams: 


                                 01/14/19 11:07





                                 01/14/19 11:07


Labs: 


                         Laboratory Results - last 24 hr











  01/14/19 01/14/19 01/14/19





  11:07 11:07 11:07


 


WBC  9.1  


 


RBC  4.44  


 


Hgb  13.0  


 


Hct  40.0  


 


MCV  90.0  D  


 


MCH  29.2  


 


MCHC  32.4 L  


 


RDW  18.4 H  


 


Plt Count  200  


 


MPV  9.0  


 


Neut % (Auto)  83.3 H  


 


Lymph % (Auto)  7.9 L  


 


Mono % (Auto)  7.7  


 


Eos % (Auto)  0.5  


 


Baso % (Auto)  0.6  


 


Neut # (Auto)  7.6 H  


 


Lymph # (Auto)  0.7 L  


 


Mono # (Auto)  0.7  


 


Eos # (Auto)  0.0  


 


Baso # (Auto)  0.1  


 


Neutrophils % (Manual)  83 H  


 


Lymphocytes % (Manual)  9 L  


 


Monocytes % (Manual)  7  


 


Eosinophils % (Manual)  1  


 


Platelet Estimate  Normal  


 


PT   16.3 H 


 


INR   1.5 


 


APTT   31 


 


Sodium    141


 


Potassium    4.4


 


Chloride    106


 


Carbon Dioxide    28


 


Anion Gap    12


 


BUN    21 H


 


Creatinine    1.0


 


Est GFR ( Amer)    > 60


 


Est GFR (Non-Af Amer)    > 60


 


Random Glucose    135 H D


 


Calcium    8.8


 


Total Bilirubin    2.1 H


 


AST    40


 


ALT    59


 


Alkaline Phosphatase    172 H D


 


Total Creatine Kinase    195 H


 


CK-MB (Mass)    5.51 H


 


Troponin I    0.0500


 


NT-Pro-B Natriuret Pep    4820 H


 


Total Protein    7.3


 


Albumin    4.1


 


Globulin    3.2


 


Albumin/Globulin Ratio    1.3














  01/15/19





  02:00


 


WBC 


 


RBC 


 


Hgb 


 


Hct 


 


MCV 


 


MCH 


 


MCHC 


 


RDW 


 


Plt Count 


 


MPV 


 


Neut % (Auto) 


 


Lymph % (Auto) 


 


Mono % (Auto) 


 


Eos % (Auto) 


 


Baso % (Auto) 


 


Neut # (Auto) 


 


Lymph # (Auto) 


 


Mono # (Auto) 


 


Eos # (Auto) 


 


Baso # (Auto) 


 


Neutrophils % (Manual) 


 


Lymphocytes % (Manual) 


 


Monocytes % (Manual) 


 


Eosinophils % (Manual) 


 


Platelet Estimate 


 


PT 


 


INR 


 


APTT 


 


Sodium 


 


Potassium 


 


Chloride 


 


Carbon Dioxide 


 


Anion Gap 


 


BUN 


 


Creatinine 


 


Est GFR ( Amer) 


 


Est GFR (Non-Af Amer) 


 


Random Glucose 


 


Calcium 


 


Total Bilirubin 


 


AST 


 


ALT 


 


Alkaline Phosphatase 


 


Total Creatine Kinase  126


 


CK-MB (Mass)  3.33


 


Troponin I  0.0530


 


NT-Pro-B Natriuret Pep 


 


Total Protein 


 


Albumin 


 


Globulin 


 


Albumin/Globulin Ratio 














Assessment & Plan





- Assessment and Plan (Free Text)


Assessment: 





Congestive Heart Failure


3/28: Continue lasix 40mg BID IVP qD 


   GDMT for Systolic CHF


          Patient likely a good candidate for LVAD


          Will transfer him to [place] for further heart failure therapy





Hypertension


Continue ACEI and B blockers


will consider changing to Entresto





Coronary artery disease


Continue ASA 81


s/p stents





Ascites/Pleural effusion


  Mgt as per Medicine and Pulmonary

## 2019-01-15 NOTE — CP.PCM.PN
Subjective





- Date & Time of Evaluation


Date of Evaluation: 01/15/19


Time of Evaluation: 12:00





- Subjective


Subjective: 





chart review


seen by Pulmonary- for Thoracentesis


seen by cardio- medication updated, further cardio plan noted 





vitals stable


Echo ongoing 





patient seen


comfortable, but still with some breathing discomfort, once in a while coughs bu

t nothing big


no fever 


has respiratory treatment 


aware of condition and plan of thoracentessi today


aware of cardio plan as discussed 








Objective





- Vital Signs/Intake and Output


Vital Signs (last 24 hours): 


                                        











Temp Pulse Resp BP Pulse Ox


 


 97.7 F   76   20   125/74   94 L


 


 01/15/19 08:00  01/15/19 08:00  01/15/19 08:00  01/15/19 08:00  01/15/19 08:00











- Medications


Medications: 


                               Current Medications





Albuterol/Ipratropium (Duoneb 3 Mg/0.5 Mg (3 Ml) Ud)  3 ml INH RQ4 [facility]


   Last Admin: 01/15/19 07:57 Dose:  3 ml


Aspirin (Ecotrin)  1 mg PO DAILY [facility]


Carvedilol (Coreg)  3.125 mg PO BID [facility]


   Last Admin: 01/14/19 15:48 Dose:  3.125 mg


Enoxaparin Sodium (Lovenox)  40 mg SC DAILY [facility]


Furosemide (Lasix)  40 mg PO DAILY [facility]


Ceftriaxone Sodium 2 gm/ (Sodium Chloride)  100 mls @ 100 mls/hr IVPB DAILY [facility];

Protocol


   Last Admin: 01/14/19 15:47 Dose:  100 mls/hr


Losartan Potassium (Cozaar)  25 mg PO DAILY [facility]


   Last Admin: 01/14/19 15:47 Dose:  25 mg


Potassium Chloride (Klor-Con 10)  10 meq PO DAILY [facility]


Rosuvastatin Calcium (Crestor)  10 mg PO HS [facility]


   Last Admin: 01/14/19 22:18 Dose:  10 mg


Spironolactone (Aldactone)  25 mg PO BID [facility]


   Last Admin: 01/14/19 15:47 Dose:  25 mg











- Labs


Labs: 


                                        





                                 01/14/19 11:07 





                                 01/14/19 11:07 





                                        











PT  16.3 SECONDS (9.7-12.2)  H  01/14/19  11:07    


 


INR  1.5   01/14/19  11:07    


 


APTT  31 SECONDS (21-34)   01/14/19  11:07    














- Constitutional


Appears: Non-toxic, No Acute Distress





- Head Exam


Head Exam: ATRAUMATIC, NORMOCEPHALIC





- Eye Exam


Eye Exam: Normal appearance





- ENT Exam


ENT Exam: Mucous Membranes Moist





- Neck Exam


Neck Exam: Full ROM.  absent: Tenderness





- Respiratory Exam


Respiratory Exam: Decreased Breath Sounds, NORMAL BREATHING PATTERN





- Cardiovascular Exam


Cardiovascular Exam: REGULAR RHYTHM





- GI/Abdominal Exam


GI & Abdominal Exam: Soft, Normal Bowel Sounds.  absent: Tenderness





- Extremities Exam


Extremities Exam: Full ROM.  absent: Pedal Edema





- Back Exam


Back Exam: absent: rash noted





- Neurological Exam


Neurological Exam: Alert, Awake, Normal Gait, Oriented x3





- Psychiatric Exam


Psychiatric exam: Normal Affect, Normal Mood





- Skin


Skin Exam: Intact, Normal Color





Assessment and Plan





- Assessment and Plan (Free Text)


Assessment: 





Patient with severe CHF_further discussion with cardio- plan for LVED- as per 

cardiac notes


on lasix. low salt diet 


with defibrillator , 





Pleural Effusion- for thoracentesis- that was done today - about 2 liters 

drained , no complication


further monitoring 


as above


on antibiotic 


afebrile 





continue monitoring 


GI prophylaxis 


DVT accordingly

## 2019-01-15 NOTE — CP.PCM.PN
Subjective





- Date & Time of Evaluation


Date of Evaluation: 01/15/19


Time of Evaluation: 12:00





- Subjective


Subjective: 





Patient seen and examined at bedside this AM. His SOB and dyspnea has been 

persistent. He is frustrated that his right pleural effusion has returned (last 

tapped Oct 2018). Patient consented to and underwent thoracentesis today. 1850 

mL of straw-colored fluid was drained from right pleural effusion. Patient 

tolerated procedure well.





Exam:


Gen - no acute distress


Card - RRR, no murmurs, rubs, or gallops


Lungs - normal breathing pattern, right sided diminished breath sounds and 

dullness to percussion 2/3 of way up; no wheezes


GI: ascitic abdomen, distended, non-tender, no guarding, rebound, or rigidity


Extr: 1+ pitting edema b/l lower extremities





A&P


1. Pleural effusion - right side


- thoracentesis completed today 1/15/19: 1850 mL straw-colored fluid drained 

from right pleural effusion


- get repeat CXR


- pending pleural fluid testing: LDL, TG, protein, glucose, amylase, cytology





2. CHF


- continue Lasix and Aldactone


- pending echo read  


- patient to f/u with Dr. Suresh





Objective





- Vital Signs/Intake and Output


Vital Signs (last 24 hours): 


                                        











Temp Pulse Resp BP Pulse Ox


 


 97.8 F   84   18   127/82   93 L


 


 01/15/19 13:35  01/15/19 13:35  01/15/19 13:35  01/15/19 13:35  01/15/19 13:35











- Medications


Medications: 


                               Current Medications





Albuterol/Ipratropium (Duoneb 3 Mg/0.5 Mg (3 Ml) Ud)  3 ml INH RQ4 [facility]


   Last Admin: 01/15/19 13:54 Dose:  3 ml


Aspirin (Ecotrin)  81 mg PO DAILY [facility]


Carvedilol (Coreg)  3.125 mg PO BID [facility]


   Last Admin: 01/15/19 11:03 Dose:  3.125 mg


Enoxaparin Sodium (Lovenox)  40 mg SC DAILY [facility]


   Last Admin: 01/15/19 13:23 Dose:  Not Given


Furosemide (Lasix)  40 mg PO DAILY [facility]


   Last Admin: 01/15/19 11:03 Dose:  40 mg


Ceftriaxone Sodium 2 gm/ (Sodium Chloride)  100 mls @ 100 mls/hr IVPB DAILY [facility];

Protocol


   Last Admin: 01/15/19 11:04 Dose:  100 mls/hr


Losartan Potassium (Cozaar)  25 mg PO DAILY [facility]


   Last Admin: 01/15/19 11:03 Dose:  25 mg


Potassium Chloride (Klor-Con 10)  10 meq PO DAILY [facility]


   Last Admin: 01/15/19 11:03 Dose:  10 meq


Rosuvastatin Calcium (Crestor)  10 mg PO HS [facility]


   Last Admin: 01/14/19 22:18 Dose:  10 mg


Spironolactone (Aldactone)  25 mg PO BID [facility]


   Last Admin: 01/15/19 11:03 Dose:  25 mg











- Labs


Labs: 


                                        





                                 01/14/19 11:07 





                                 01/14/19 11:07 





                                        











PT  16.3 SECONDS (9.7-12.2)  H  01/14/19  11:07    


 


INR  1.5   01/14/19  11:07    


 


APTT  31 SECONDS (21-34)   01/14/19  11:07    














Assessment and Plan


(1) Pleural effusion, right


Status: Acute   





(2) Ascites


Status: Acute   





(3) Cirrhosis of liver


Status: Acute

## 2019-01-16 LAB
ALBUMIN SERPL-MCNC: 3.3 G/DL (ref 3.5–5)
ALBUMIN/GLOB SERPL: 1.1 {RATIO} (ref 1–2.1)
ALT SERPL-CCNC: 30 U/L (ref 21–72)
AST SERPL-CCNC: 26 U/L (ref 17–59)
BASOPHILS # BLD AUTO: 0 K/UL (ref 0–0.2)
BASOPHILS NFR BLD: 0.5 % (ref 0–2)
BUN SERPL-MCNC: 23 MG/DL (ref 9–20)
CALCIUM SERPL-MCNC: 8.3 MG/DL (ref 8.6–10.4)
EOSINOPHIL # BLD AUTO: 0.2 K/UL (ref 0–0.7)
EOSINOPHIL NFR BLD: 1.9 % (ref 0–4)
ERYTHROCYTE [DISTWIDTH] IN BLOOD BY AUTOMATED COUNT: 17.7 % (ref 11.5–14.5)
GFR NON-AFRICAN AMERICAN: > 60
HGB BLD-MCNC: 13.1 G/DL (ref 12–18)
LYMPHOCYTES # BLD AUTO: 1.9 K/UL (ref 1–4.3)
LYMPHOCYTES NFR BLD AUTO: 18.4 % (ref 20–40)
MCH RBC QN AUTO: 29.3 PG (ref 27–31)
MCHC RBC AUTO-ENTMCNC: 32.4 G/DL (ref 33–37)
MCV RBC AUTO: 90.2 FL (ref 80–94)
MONOCYTES # BLD: 0.9 K/UL (ref 0–0.8)
MONOCYTES NFR BLD: 8.5 % (ref 0–10)
NEUTROPHILS # BLD: 7.4 K/UL (ref 1.8–7)
NEUTROPHILS NFR BLD AUTO: 70.7 % (ref 50–75)
NRBC BLD AUTO-RTO: 0 % (ref 0–2)
PLATELET # BLD: 185 K/UL (ref 130–400)
PMV BLD AUTO: 9.5 FL (ref 7.2–11.7)
RBC # BLD AUTO: 4.48 MIL/UL (ref 4.4–5.9)
WBC # BLD AUTO: 10.4 K/UL (ref 4.8–10.8)

## 2019-01-16 RX ADMIN — IPRATROPIUM BROMIDE AND ALBUTEROL SULFATE SCH: .5; 3 SOLUTION RESPIRATORY (INHALATION) at 20:28

## 2019-01-16 RX ADMIN — IPRATROPIUM BROMIDE AND ALBUTEROL SULFATE SCH: .5; 3 SOLUTION RESPIRATORY (INHALATION) at 05:09

## 2019-01-16 RX ADMIN — IPRATROPIUM BROMIDE AND ALBUTEROL SULFATE SCH: .5; 3 SOLUTION RESPIRATORY (INHALATION) at 23:56

## 2019-01-16 RX ADMIN — IPRATROPIUM BROMIDE AND ALBUTEROL SULFATE SCH ML: .5; 3 SOLUTION RESPIRATORY (INHALATION) at 00:21

## 2019-01-16 RX ADMIN — IPRATROPIUM BROMIDE AND ALBUTEROL SULFATE SCH ML: .5; 3 SOLUTION RESPIRATORY (INHALATION) at 07:10

## 2019-01-16 RX ADMIN — ENOXAPARIN SODIUM SCH MG: 40 INJECTION SUBCUTANEOUS at 09:22

## 2019-01-16 RX ADMIN — IPRATROPIUM BROMIDE AND ALBUTEROL SULFATE SCH: .5; 3 SOLUTION RESPIRATORY (INHALATION) at 11:20

## 2019-01-16 RX ADMIN — IPRATROPIUM BROMIDE AND ALBUTEROL SULFATE SCH ML: .5; 3 SOLUTION RESPIRATORY (INHALATION) at 16:43

## 2019-01-16 RX ADMIN — POTASSIUM CHLORIDE SCH MEQ: 10 TABLET, FILM COATED, EXTENDED RELEASE ORAL at 09:21

## 2019-01-16 NOTE — RAD
Date of service: 



01/15/2019



HISTORY:

 S/P thoracentesis 



COMPARISON:

Portable chest 01/14/2019. 



FINDINGS:



LUNGS:

AICD/pacemaker again noted in situ.



Markedly improved aeration is appreciated at the inferior right lung 

zone status post thoracentesis with residual atelectasis or 

infiltrate noted at the mid lung zone moderately and minimally at the 

inferior right lung zone.  No left-sided airspace disease.



PLEURA:

No significant pleural effusion identified, no pneumothorax apparent.



CARDIOVASCULAR:

No aortic atherosclerotic calcification present.



Prominent cardiac silhouette identified though likely with some 

technical magnification given frontal technique.  No pulmonary 

vascular congestion. 



OSSEOUS STRUCTURES:

No significant abnormalities.



VISUALIZED UPPER ABDOMEN:

Normal.



OTHER FINDINGS:

None.



IMPRESSION:

No definitive right pleural effusion appreciable this time with 

mild-to-moderate infiltrate remaining at the mid to inferior right 

lung zone versus atelectasis.  No pneumothorax bilaterally.  No left 

pleural effusion.  No pulmonary vascular congestion.

## 2019-01-16 NOTE — CP.PCM.PN
Subjective





- Date & Time of Evaluation


Date of Evaluation: 01/16/19


Time of Evaluation: 09:00





- Subjective


Subjective: 





chart review





labs  no acute changes


post  thoracentesis day 1


cardio discussion- for further management- arranging transfer 








Patient seen


 feeling and breathing better-


aware of condition


aware of plan


no fever


nocough 


ambulatory  


no complaints- BP- on low side 





Objective





- Vital Signs/Intake and Output


Vital Signs (last 24 hours): 


                                        











Temp Pulse Resp BP Pulse Ox


 


 97.8 F   107 H  18   98/62 L  99 


 


 01/16/19 17:38  01/16/19 17:38  01/16/19 17:38  01/16/19 17:38  01/16/19 17:38











- Medications


Medications: 


                               Current Medications





Albuterol/Ipratropium (Duoneb 3 Mg/0.5 Mg (3 Ml) Ud)  3 ml INH RQ4 [facility]


   Last Admin: 01/16/19 16:43 Dose:  3 ml


Aspirin (Ecotrin)  81 mg PO DAILY [facility]


   Last Admin: 01/16/19 09:21 Dose:  81 mg


Carvedilol (Coreg)  3.125 mg PO BID [facility]


   Last Admin: 01/16/19 18:03 Dose:  3.125 mg


Enoxaparin Sodium (Lovenox)  40 mg SC DAILY [facility]


   Last Admin: 01/16/19 09:22 Dose:  40 mg


Furosemide (Lasix)  40 mg PO DAILY [facility]


   Last Admin: 01/16/19 09:21 Dose:  40 mg


Ceftriaxone Sodium 2 gm/ (Sodium Chloride)  100 mls @ 100 mls/hr IVPB DAILY [facility];

Protocol


   Last Admin: 01/16/19 09:31 Dose:  100 mls/hr


Losartan Potassium (Cozaar)  25 mg PO DAILY [facility]


   Last Admin: 01/16/19 09:21 Dose:  25 mg


Potassium Chloride (Klor-Con 10)  10 meq PO DAILY [facility]


   Last Admin: 01/16/19 09:21 Dose:  10 meq


Rosuvastatin Calcium (Crestor)  10 mg PO HS [facility]


   Last Admin: 01/15/19 21:20 Dose:  10 mg


Spironolactone (Aldactone)  25 mg PO BID [facility]


   Last Admin: 01/16/19 18:03 Dose:  25 mg











- Labs


Labs: 


                                        





                                 01/16/19 08:31 





                                 01/16/19 08:31 





                                        











PT  16.3 SECONDS (9.7-12.2)  H  01/14/19  11:07    


 


INR  1.5   01/14/19  11:07    


 


APTT  31 SECONDS (21-34)   01/14/19  11:07    














- Constitutional


Appears: Non-toxic, No Acute Distress





- Head Exam


Head Exam: ATRAUMATIC, NORMOCEPHALIC





- Eye Exam


Eye Exam: Normal appearance.  absent: Nystagmus





- ENT Exam


ENT Exam: Mucous Membranes Moist





- Neck Exam


Neck Exam: Full ROM





- Respiratory Exam


Respiratory Exam: Decreased Breath Sounds (but much better- better aeration).  

absent: Accessory Muscle Use, Wheezes





- Cardiovascular Exam


Cardiovascular Exam: REGULAR RHYTHM





- GI/Abdominal Exam


GI & Abdominal Exam: Soft, Normal Bowel Sounds.  absent: Tenderness





- Extremities Exam


Extremities Exam: Full ROM.  absent: Pedal Edema





- Back Exam


Back Exam: Full ROM





- Neurological Exam


Neurological Exam: Alert, Awake, Normal Gait, Oriented x3





- Psychiatric Exam


Psychiatric exam: Normal Affect, Normal Mood





- Skin


Skin Exam: Intact, Normal Color





Assessment and Plan





- Assessment and Plan (Free Text)


Assessment: 





Patient with Severe CHF- cardiac plan of further management in other 

institution- preparation ongoing 


on lasix


cardiac meds





Pleural effusion post thoracentesis- with improvement


On antibioitic for possible pneumonia





stable





plan as above

## 2019-01-16 NOTE — CARD
--------------- APPROVED REPORT --------------





Date of service: 01/15/2019



EXAM: Two-dimensional and M-mode echocardiogram with Doppler and 

color Doppler.



Other Information 

Quality : GoodRhythm : 



INDICATION

Dyspnea Cardiac Disease: CAD Congestive Heart Failure 



Surgery/Intervention

Pacemaker: 



2D DIMENSIONS 

IVC0.00 cm



M-Mode DIMENSIONS 

RVDd3.48   (2.1-3.2cm)Left Atrium (MM)4.10   (2.5-4.0cm)

IVSd0.43   (0.7-1.1cm)Aortic Root3.12   (2.2-3.7cm)

LVDd6.48   (4.0-5.6cm)Aortic Cusp Exc.1.76   (1.5-2.0cm)

PWd0.66   (0.7-1.1cm)FS (%) 22   %

LVDs5.04   (2.0-3.8cm)LVEF (%)44   (>50%)



Mitral Valve

MV E Velcnlgj029.4cm/sMV A Cxdnhotr08.2cm/sE/A ratio2.7



TDI

Lateral E' Peak V10.03cm/sMedial E' Peak V4.28cm/sE/Lateral 

E'11.1

E/Medial E'26.0



Tricuspid Valve

TR Peak Zbhadiir113zi/sTR Peak Gr.[id number]



 LEFT VENTRICLE 

The Left Ventricle is moderately dilated.

There is normal left ventricular wall thickness.

Left ventricle systolic function is mildly impaired.

The Ejection Fraction is 40-45%.

There is mild global hypokinesis of the left ventricle.

The left ventricular diastolic function is normal.

Apical echoes consistent with trabeculae are noted.  Cannot rule out 

associated thrombi.



 RIGHT VENTRICLE 

The right ventricle is normal size.

There is normal right ventricular wall thickness.

Systolic function is mildly reduced.



 ATRIA 

The left atrium is mildly dilated.

The right atrium is mildly dilated.

The interatrial septum is intact with no evidence for an atrial 

septal defect.



 AORTIC VALVE 

The aortic valve is normal in structure.

No aortic regurgitation is present.

There is no aortic valvular stenosis. 

There is no aortic valvular vegetation.



 MITRAL VALVE 

The mitral valve is normal in structure.

There is no evidence of mitral valve prolapse.

There is no mitral valve stenosis.

Mitral regurgitation is mild to moderate.



 TRICUSPID VALVE 

The tricuspid valve is normal in structure.

There is mild tricuspid regurgitation.

Right ventricular systolic pressure is estimated at 30-40 mmHg. 

There is mild pulmonary hypertension.



 PULMONIC VALVE 

The pulmonic valve is not well visualized.

There is mild pulmonic valvular regurgitation. 



 GREAT VESSELS 

The aortic root is normal in size.



 PERICARDIAL EFFUSION 

There is no significant pericardial effusion.



<Conclusion>

Left ventricle systolic function is mildly impaired.

The Ejection Fraction is 40-45%.

Apical echoes consistent with trabeculae are noted.  Cannot rule out 

associated thrombi.

Mitral regurgitation is mild to moderate.

There is mild tricuspid regurgitation.

There is mild pulmonary hypertension.

There is mild pulmonic valvular regurgitation.

## 2019-01-16 NOTE — CARD
--------------- APPROVED REPORT --------------





Date of service: 01/14/2019



EKG Measurement

Heart [id number]

SC [id number]

[id number]

[id number]

[id number]



<Conclusion>

Normal sinus rhythm

T wave abnormality, consider lateral ischemia

Prolonged QT

Abnormal ECG

## 2019-01-16 NOTE — CP.PCM.PN
<Martita Mai - Last Filed: 01/16/19 14:08>





Subjective





- Date & Time of Evaluation


Date of Evaluation: 01/16/19


Time of Evaluation: 11:00





- Subjective


Subjective: 





Cardiology Progress Note:





Patient was seen and examined at bedside. Patient states he is feeling well and 

has a good appetite.  Patient denies chest pain, nausea, vomiting, fever or 

chills. 








Objective





- Vital Signs/Intake and Output


Vital Signs (last 24 hours): 


                                        











Temp Pulse Resp BP Pulse Ox


 


 97.9 F   68   18   110/68   96 


 


 01/16/19 07:00  01/16/19 07:39  01/16/19 07:00  01/16/19 09:21  01/16/19 07:00











- Medications


Medications: 


                               Current Medications





Albuterol/Ipratropium (Duoneb 3 Mg/0.5 Mg (3 Ml) Ud)  3 ml INH RQ4 [facility]


   Last Admin: 01/16/19 07:10 Dose:  3 ml


Aspirin (Ecotrin)  81 mg PO DAILY [facility]


   Last Admin: 01/16/19 09:21 Dose:  81 mg


Carvedilol (Coreg)  3.125 mg PO BID [facility]


   Last Admin: 01/16/19 09:21 Dose:  3.125 mg


Enoxaparin Sodium (Lovenox)  40 mg SC DAILY [facility]


   Last Admin: 01/16/19 09:22 Dose:  40 mg


Furosemide (Lasix)  40 mg PO DAILY [facility]


   Last Admin: 01/16/19 09:21 Dose:  40 mg


Ceftriaxone Sodium 2 gm/ (Sodium Chloride)  100 mls @ 100 mls/hr IVPB DAILY [facility];

Protocol


   Last Admin: 01/16/19 09:31 Dose:  100 mls/hr


Losartan Potassium (Cozaar)  25 mg PO DAILY [facility]


   Last Admin: 01/16/19 09:21 Dose:  25 mg


Potassium Chloride (Klor-Con 10)  10 meq PO DAILY [facility]


   Last Admin: 01/16/19 09:21 Dose:  10 meq


Rosuvastatin Calcium (Crestor)  10 mg PO HS [facility]


   Last Admin: 01/15/19 21:20 Dose:  10 mg


Spironolactone (Aldactone)  25 mg PO BID [facility]


   Last Admin: 01/16/19 09:21 Dose:  25 mg











- Labs


Labs: 


                                        





                                 01/16/19 08:31 





                                 01/16/19 08:31 





                                        











PT  16.3 SECONDS (9.7-12.2)  H  01/14/19  11:07    


 


INR  1.5   01/14/19  11:07    


 


APTT  31 SECONDS (21-34)   01/14/19  11:07    














- Constitutional


Appears: No Acute Distress, Chronically Ill





- Head Exam


Head Exam: ATRAUMATIC, NORMAL INSPECTION





- Eye Exam


Eye Exam: Normal appearance





- ENT Exam


ENT Exam: Mucous Membranes Moist





- Respiratory Exam


Respiratory Exam: Clear to Ausculation Bilateral, NORMAL BREATHING PATTERN





- Cardiovascular Exam


Cardiovascular Exam: REGULAR RHYTHM, +S1, +S2





- GI/Abdominal Exam


GI & Abdominal Exam: Distended, Soft, Normal Bowel Sounds, Organomegaly.  

absent: Tenderness





- Extremities Exam


Extremities Exam: Normal Inspection





- Neurological Exam


Neurological Exam: Alert, Awake, Oriented x3





- Psychiatric Exam


Psychiatric exam: Normal Affect





Assessment and Plan





- Assessment and Plan (Free Text)


Assessment: 





58 year old male with past medical history of ascites and CHF (with an EF <35%) 

presented to the ED with complaints shortness of breath, pedal edema and 

ascites. 





Systolic Congestive Heart Failure - Stage D 


Continue lasix 40mg daily


Coreg 3.125mg po bid


Aspirin 81mg daily 


Losartan 25mg po daily 


          Patient likely a good candidate for LVAD


          Pending transfer to [place] for further heart failure therapy





Hypertension


Continue ACEI and B blockers


will consider changing to Entresto





Coronary artery disease


Continue ASA 81


Crestor 10mg po HS


s/p stents





Case discussed with Dr. Kerwin Mai PGY-2 





<Rashaad Suresh - Last Filed: 01/16/19 22:50>





Objective





- Vital Signs/Intake and Output


Vital Signs (last 24 hours): 


                                        











Temp Pulse Resp BP Pulse Ox


 


 97.8 F   74   18   98/62 L  99 


 


 01/16/19 17:38  01/16/19 21:37  01/16/19 17:38  01/16/19 17:38  01/16/19 17:38








Intake and Output: 


                                        











 01/16/19 01/17/19





 18:59 06:59


 


Intake Total  300


 


Balance  300














- Medications


Medications: 


                               Current Medications





Albuterol/Ipratropium (Duoneb 3 Mg/0.5 Mg (3 Ml) Ud)  3 ml INH RQ4 [facility]


   Last Admin: 01/16/19 20:28 Dose:  Not Given


Aspirin (Ecotrin)  81 mg PO DAILY [facility]


   Last Admin: 01/16/19 09:21 Dose:  81 mg


Carvedilol (Coreg)  3.125 mg PO BID [facility]


   Last Admin: 01/16/19 18:03 Dose:  3.125 mg


Enoxaparin Sodium (Lovenox)  40 mg SC DAILY [facility]


   Last Admin: 01/16/19 09:22 Dose:  40 mg


Furosemide (Lasix)  40 mg PO DAILY [facility]


   Last Admin: 01/16/19 09:21 Dose:  40 mg


Ceftriaxone Sodium 2 gm/ (Sodium Chloride)  100 mls @ 100 mls/hr IVPB DAILY [facility];

Protocol


   Last Admin: 01/16/19 09:31 Dose:  100 mls/hr


Losartan Potassium (Cozaar)  25 mg PO DAILY [facility]


   Last Admin: 01/16/19 09:21 Dose:  25 mg


Potassium Chloride (Klor-Con 10)  10 meq PO DAILY [facility]


   Last Admin: 01/16/19 09:21 Dose:  10 meq


Rosuvastatin Calcium (Crestor)  10 mg PO HS [facility]


   Last Admin: 01/16/19 21:40 Dose:  10 mg


Spironolactone (Aldactone)  25 mg PO BID [facility]


   Last Admin: 01/16/19 18:03 Dose:  25 mg











- Labs


Labs: 


                                        





                                 01/16/19 08:31 





                                 01/16/19 08:31 





                                        











PT  16.3 SECONDS (9.7-12.2)  H  01/14/19  11:07    


 


INR  1.5   01/14/19  11:07    


 


APTT  31 SECONDS (21-34)   01/14/19  11:07    














Assessment and Plan





- Assessment and Plan (Free Text)


Assessment: 


Patient examined and evaluated personally by me. Plan of care d/w the resident 

and as documented

## 2019-01-16 NOTE — CP.PCM.PN
Subjective





- Date & Time of Evaluation


Date of Evaluation: 01/16/19


Time of Evaluation: 12:00





- Subjective


Subjective: 








Patient seen and examined at bedside this AM. Patient resting comfortably, 

states he is feeling better. His cough has improved since yesterday. He is 

hopeful he can go home soon. He is afebrile, satting 96% on 2L [place].





Exam:


Gen - no acute distress


Card - RRR, no murmurs, rubs, or gallops


Lungs - normal breathing pattern, right sided diminished breath sounds with 

crackles 2/3 of way up


GI: ascitic abdomen, distended, non-tender, no guarding, rebound, or rigidity


Extr: 1+ pitting edema b/l lower extremities





A&P


1. Pleural effusion - right side


- 1/15/19 thoracentesis: 1850 mL straw-colored fluid drained from right pleural 

effusion


- 1/15/19 repeat CXR post thoracentesis: no definite right pleural effusion; 

mild-moderate infiltrate at mid to inferior right lung zone versus atelectasis


- pending pleural fluid testing: LDL, TG, protein, glucose, amylase, cytology





2. CHF


- continue Lasix and Aldactone


- 1/15/19 echo:  LVEF 40-45%


- patient to f/u with Dr. Suresh





Objective





- Vital Signs/Intake and Output


Vital Signs (last 24 hours): 


                                        











Temp Pulse Resp BP Pulse Ox


 


 97.9 F   68   18   110/68   96 


 


 01/16/19 07:00  01/16/19 07:39  01/16/19 07:00  01/16/19 09:21  01/16/19 07:00











- Medications


Medications: 


                               Current Medications





Albuterol/Ipratropium (Duoneb 3 Mg/0.5 Mg (3 Ml) Ud)  3 ml INH RQ4 [facility]


   Last Admin: 01/16/19 11:20 Dose:  Not Given


Aspirin (Ecotrin)  81 mg PO DAILY [facility]


   Last Admin: 01/16/19 09:21 Dose:  81 mg


Carvedilol (Coreg)  3.125 mg PO BID [facility]


   Last Admin: 01/16/19 09:21 Dose:  3.125 mg


Enoxaparin Sodium (Lovenox)  40 mg SC DAILY [facility]


   Last Admin: 01/16/19 09:22 Dose:  40 mg


Furosemide (Lasix)  40 mg PO DAILY [facility]


   Last Admin: 01/16/19 09:21 Dose:  40 mg


Ceftriaxone Sodium 2 gm/ (Sodium Chloride)  100 mls @ 100 mls/hr IVPB DAILY [facility];

Protocol


   Last Admin: 01/16/19 09:31 Dose:  100 mls/hr


Losartan Potassium (Cozaar)  25 mg PO DAILY [facility]


   Last Admin: 01/16/19 09:21 Dose:  25 mg


Potassium Chloride (Klor-Con 10)  10 meq PO DAILY [facility]


   Last Admin: 01/16/19 09:21 Dose:  10 meq


Rosuvastatin Calcium (Crestor)  10 mg PO HS [facility]


   Last Admin: 01/15/19 21:20 Dose:  10 mg


Spironolactone (Aldactone)  25 mg PO BID [facility]


   Last Admin: 01/16/19 09:21 Dose:  25 mg











- Labs


Labs: 


                                        





                                 01/16/19 08:31 





                                 01/16/19 08:31 





                                        











PT  16.3 SECONDS (9.7-12.2)  H  01/14/19  11:07    


 


INR  1.5   01/14/19  11:07    


 


APTT  31 SECONDS (21-34)   01/14/19  11:07    














Assessment and Plan


(1) Pleural effusion, right


Status: Acute   





(2) Ascites


Status: Acute   





(3) Cirrhosis of liver


Status: Acute

## 2019-01-17 VITALS — RESPIRATION RATE: 20 BRPM

## 2019-01-17 VITALS
OXYGEN SATURATION: 98 % | HEART RATE: 74 BPM | SYSTOLIC BLOOD PRESSURE: 137 MMHG | DIASTOLIC BLOOD PRESSURE: 93 MMHG | TEMPERATURE: 97.9 F

## 2019-01-17 LAB
AMYLASE PLR-CCNC: 12 U/L
GLUCOSE PLR-MCNC: 150 MG/DL
LDH PLR-CCNC: 243 U/L
TOTAL PROTEIN PLEURAL FLUID: <3 G/DL
TRIGL PLR-MCNC: 19 MG/DL

## 2019-01-17 RX ADMIN — IPRATROPIUM BROMIDE AND ALBUTEROL SULFATE SCH ML: .5; 3 SOLUTION RESPIRATORY (INHALATION) at 07:35

## 2019-01-17 RX ADMIN — IPRATROPIUM BROMIDE AND ALBUTEROL SULFATE SCH ML: .5; 3 SOLUTION RESPIRATORY (INHALATION) at 11:10

## 2019-01-17 RX ADMIN — IPRATROPIUM BROMIDE AND ALBUTEROL SULFATE SCH ML: .5; 3 SOLUTION RESPIRATORY (INHALATION) at 15:59

## 2019-01-17 RX ADMIN — IPRATROPIUM BROMIDE AND ALBUTEROL SULFATE SCH: .5; 3 SOLUTION RESPIRATORY (INHALATION) at 03:25

## 2019-01-17 RX ADMIN — ENOXAPARIN SODIUM SCH MG: 40 INJECTION SUBCUTANEOUS at 09:34

## 2019-01-17 RX ADMIN — POTASSIUM CHLORIDE SCH MEQ: 10 TABLET, FILM COATED, EXTENDED RELEASE ORAL at 09:33

## 2019-01-17 NOTE — CP.PCM.PN
Subjective





- Date & Time of Evaluation


Date of Evaluation: 01/17/19


Time of Evaluation: 09:30





- Subjective


Subjective: 





Chart review





Vitals noted-BP on low side


no unusual event noted 


further discussion with cardio- transfer in progress 





Patient seen


breathing good- no complaints 


aware of transfer plan


got a bed today 








Objective





- Vital Signs/Intake and Output


Vital Signs (last 24 hours): 


                                        











Temp Pulse Resp BP Pulse Ox


 


 97.8 F   77   20   106/64   97 


 


 01/16/19 23:25  01/16/19 23:25  01/16/19 23:25  01/16/19 23:25  01/16/19 23:25








Intake and Output: 


                                        











 01/17/19 01/17/19





 06:59 18:59


 


Intake Total 300 


 


Balance 300 














- Medications


Medications: 


                               Current Medications





Albuterol/Ipratropium (Duoneb 3 Mg/0.5 Mg (3 Ml) Ud)  3 ml INH RQ4 [facility]


   Last Admin: 01/17/19 03:25 Dose:  Not Given


Aspirin (Ecotrin)  81 mg PO DAILY [facility]


   Last Admin: 01/16/19 09:21 Dose:  81 mg


Carvedilol (Coreg)  3.125 mg PO BID [facility]


   Last Admin: 01/16/19 18:03 Dose:  3.125 mg


Enoxaparin Sodium (Lovenox)  40 mg SC DAILY [facility]


   Last Admin: 01/16/19 09:22 Dose:  40 mg


Furosemide (Lasix)  40 mg PO DAILY [facility]


   Last Admin: 01/16/19 09:21 Dose:  40 mg


Ceftriaxone Sodium 2 gm/ (Sodium Chloride)  100 mls @ 100 mls/hr IVPB DAILY [facility];

Protocol


   Last Admin: 01/16/19 09:31 Dose:  100 mls/hr


Losartan Potassium (Cozaar)  25 mg PO DAILY [facility]


   Last Admin: 01/16/19 09:21 Dose:  25 mg


Potassium Chloride (Klor-Con 10)  10 meq PO DAILY [facility]


   Last Admin: 01/16/19 09:21 Dose:  10 meq


Rosuvastatin Calcium (Crestor)  10 mg PO HS [facility]


   Last Admin: 01/16/19 21:40 Dose:  10 mg


Spironolactone (Aldactone)  25 mg PO BID [facility]


   Last Admin: 01/16/19 18:03 Dose:  25 mg











- Labs


Labs: 


                                        





                                 01/16/19 08:31 





                                 01/16/19 08:31 





                                        











PT  16.3 SECONDS (9.7-12.2)  H  01/14/19  11:07    


 


INR  1.5   01/14/19  11:07    


 


APTT  31 SECONDS (21-34)   01/14/19  11:07    














- Constitutional


Appears: Non-toxic, No Acute Distress





- Head Exam


Head Exam: ATRAUMATIC, NORMOCEPHALIC





- Eye Exam


Eye Exam: Normal appearance





- ENT Exam


ENT Exam: Mucous Membranes Moist





- Respiratory Exam


Respiratory Exam: Clear to Ausculation Bilateral, NORMAL BREATHING PATTERN





- Cardiovascular Exam


Cardiovascular Exam: REGULAR RHYTHM





- GI/Abdominal Exam


GI & Abdominal Exam: Soft, Normal Bowel Sounds





- Extremities Exam


Extremities Exam: Full ROM.  absent: Pedal Edema





- Neurological Exam


Neurological Exam: Alert, Awake, Normal Gait, Oriented x3





- Psychiatric Exam


Psychiatric exam: Normal Affect, Normal Mood





- Skin


Skin Exam: Intact, Normal Color





Assessment and Plan





- Assessment and Plan (Free Text)


Assessment: 





Patient with Severe CHF 


for transfer- for further cardiac mangement 





Recurrent effusion and ascites from above





On Diuretics


Cardio meds


Stable


asymptomatic


for transfer

## 2019-01-18 NOTE — CP.PCM.DIS
Provider





- Provider


Date of Admission: 


01/14/19 12:02





Attending physician: 


Jodi Alvares MD





Consults: 








01/14/19 12:03


Physician Consult Routine 


   Comment: chf exacerbation


   Consulting Provider: Rashaad Suresh


   Consulting Physician: Rashaad Suresh


   Reason for Consult: cardiology


Physician Consult Stat 


   Comment: chf, pleural effusion


   Consulting Provider: Jamal Pinon


   Consulting Physician: Jamal Pinon


   Reason for Consult: pulmonary











Time Spent in preparation of Discharge (in minutes): 30





Hospital Course





- Lab Results


Lab Results: 


                             Most Recent Lab Values











WBC  10.4 K/uL (4.8-10.8)   01/16/19  08:31    


 


RBC  4.48 Mil/uL (4.40-5.90)   01/16/19  08:31    


 


Hgb  13.1 g/dL (12.0-18.0)   01/16/19  08:31    


 


Hct  40.4 % (35.0-51.0)   01/16/19  08:31    


 


MCV  90.2 fL (80.0-94.0)   01/16/19  08:31    


 


MCH  29.3 pg (27.0-31.0)   01/16/19  08:31    


 


MCHC  32.4 g/dL (33.0-37.0)  L  01/16/19  08:31    


 


RDW  17.7 % (11.5-14.5)  H  01/16/19  08:31    


 


Plt Count  185 K/uL (130-400)   01/16/19  08:31    


 


MPV  9.5 fL (7.2-11.7)   01/16/19  08:31    


 


Neut % (Auto)  70.7 % (50.0-75.0)   01/16/19  08:31    


 


Lymph % (Auto)  18.4 % (20.0-40.0)  L  01/16/19  08:31    


 


Mono % (Auto)  8.5 % (0.0-10.0)   01/16/19  08:31    


 


Eos % (Auto)  1.9 % (0.0-4.0)   01/16/19  08:31    


 


Baso % (Auto)  0.5 % (0.0-2.0)   01/16/19  08:31    


 


Neut # (Auto)  7.4 K/uL (1.8-7.0)  H  01/16/19  08:31    


 


Lymph # (Auto)  1.9 K/uL (1.0-4.3)   01/16/19  08:31    


 


Mono # (Auto)  0.9 K/uL (0.0-0.8)  H  01/16/19  08:31    


 


Eos # (Auto)  0.2 K/uL (0.0-0.7)   01/16/19  08:31    


 


Baso # (Auto)  0.0 K/uL (0.0-0.2)   01/16/19  08:31    


 


Neutrophils % (Manual)  83 % (50-75)  H  01/14/19  11:07    


 


Lymphocytes % (Manual)  9 % (20-40)  L  01/14/19  11:07    


 


Monocytes % (Manual)  7 % (0-10)   01/14/19  11:07    


 


Eosinophils % (Manual)  1 % (0-4)   01/14/19  11:07    


 


Platelet Estimate  Normal  (NORMAL)   01/14/19  11:07    


 


PT  16.3 SECONDS (9.7-12.2)  H  01/14/19  11:07    


 


INR  1.5   01/14/19  11:07    


 


APTT  31 SECONDS (21-34)   01/14/19  11:07    


 


Sodium  138 mmol/L (132-148)   01/16/19  08:31    


 


Potassium  4.7 mmol/L (3.6-5.2)   01/16/19  08:31    


 


Chloride  104 mmol/L ()   01/16/19  08:31    


 


Carbon Dioxide  29 mmol/L (22-30)   01/16/19  08:31    


 


Anion Gap  10  (10-20)   01/16/19  08:31    


 


BUN  23 mg/dL (9-20)  H  01/16/19  08:31    


 


Creatinine  1.2 mg/dL (0.8-1.5)   01/16/19  08:31    


 


Est GFR ( Amer)  > 60   01/16/19  08:31    


 


Est GFR (Non-Af Amer)  > 60   01/16/19  08:31    


 


Random Glucose  95 mg/dL ()  D 01/16/19  08:31    


 


Calcium  8.3 mg/dl (8.6-10.4)  L  01/16/19  08:31    


 


Total Bilirubin  1.1 mg/dL (0.2-1.3)   01/16/19  08:31    


 


AST  26 U/L (17-59)   01/16/19  08:31    


 


ALT  30 U/L (21-72)   01/16/19  08:31    


 


Alkaline Phosphatase  137 U/L ()  H D 01/16/19  08:31    


 


Total Creatine Kinase  96 U/L ()   01/15/19  11:16    


 


CK-MB (Mass)  2.83 ng/mL (0.0-3.38)   01/15/19  11:16    


 


Troponin I  0.0340 ng/mL (0.00-0.120)   01/15/19  11:16    


 


NT-Pro-B Natriuret Pep  4820 pg/mL (0-900)  H  01/14/19  11:07    


 


Total Protein  6.4 g/dL (6.3-8.3)   01/16/19  08:31    


 


Albumin  3.3 g/dL (3.5-5.0)  L  01/16/19  08:31    


 


Globulin  3.0 gm/dL (2.2-3.9)   01/16/19  08:31    


 


Albumin/Globulin Ratio  1.1  (1.0-2.1)   01/16/19  08:31    


 


Fluid Source  Pleural/thoracentesi   01/15/19  15:06    


 


Fluid Appearance  Sl cloudy  (CLEAR)   01/15/19  15:06    


 


Fluid WBC  172.0 /mm3 (0.0-300.0)   01/15/19  15:06    


 


Fluid RBC  466.0 /mm3 (0.0-0.0)  H  01/15/19  15:06    


 


Fluid Tot Cell Count  100  (0-0)  H  01/15/19  15:06    


 


Fluid Neutrophils  4.0 % (0-0)  H  01/15/19  15:06    


 


Fluid Lymphocytes  94.0 % (0-0)  H  01/15/19  15:06    


 


Fld Monocyte/Macrophag  2 % (0-0)  H  01/15/19  15:06    


 


Fluid Comment     01/15/19  15:06    


 


Pleural Total Protein  <3.0 g/dL  01/15/19  15:06    


 


Pleural LDH  243 U/L  01/15/19  15:06    


 


Pleural Glucose  150 mg/dL  01/15/19  15:06    


 


Pleural Amylase  12 U/L  01/15/19  15:06    


 


Pleural Triglycerides  19 mg/dL  01/15/19  15:06    














- Hospital Course


Hospital Course: 





admitted for SOB Pleural effusion- with recent URI, placed on antibiotic, had 

thoracentesis- improved- and referred to [facility] for further cardiac evalu

ation due to sevre CHF 





- Date & Time of H&P


Date of H&P: 01/17/19


Time of H&P: 13:06





Discharge Exam





- Head Exam


Head Exam: ATRAUMATIC, NORMOCEPHALIC





- Eye Exam


Eye Exam: Normal appearance.  absent: Nystagmus





- ENT Exam


ENT Exam: Mucous Membranes Moist





- Respiratory Exam


Respiratory Exam: Clear to PA & Lateral, NORMAL BREATHING PATTERN





- Cardiovascular Exam


Cardiovascular Exam: REGULAR RHYTHM





- GI/Abdominal Exam


GI & Abdominal Exam: Normal Bowel Sounds, Soft.  absent: Tenderness





- Extremities Exam


Extremities exam: full ROM





- Neurological Exam


Neurological exam: Alert, Normal Gait, Oriented x3





- Psychiatric Exam


Psychiatric exam: Normal Affect, Normal Mood





- Skin


Skin Exam: Intact, Normal Color





Discharge Plan





- Follow Up Plan


Condition: GOOD


Disposition: Trans to Other Acute Care Hosp


Instructions:  Coronary Heart Disease, Heart Failure, Adult (DC), Thoracentesis,

Cirrhosis (DC), Pleural Effusion (DC)